# Patient Record
Sex: FEMALE | Race: BLACK OR AFRICAN AMERICAN | NOT HISPANIC OR LATINO | Employment: OTHER | URBAN - METROPOLITAN AREA
[De-identification: names, ages, dates, MRNs, and addresses within clinical notes are randomized per-mention and may not be internally consistent; named-entity substitution may affect disease eponyms.]

---

## 2020-10-12 ENCOUNTER — OFFICE VISIT (OUTPATIENT)
Dept: PRIMARY CARE CLINIC | Facility: CLINIC | Age: 75
End: 2020-10-12
Payer: MEDICARE

## 2020-10-12 VITALS
WEIGHT: 158.75 LBS | DIASTOLIC BLOOD PRESSURE: 68 MMHG | OXYGEN SATURATION: 99 % | HEART RATE: 72 BPM | TEMPERATURE: 98 F | SYSTOLIC BLOOD PRESSURE: 120 MMHG | BODY MASS INDEX: 29.21 KG/M2 | HEIGHT: 62 IN

## 2020-10-12 DIAGNOSIS — Z23 NEED FOR VACCINATION: ICD-10-CM

## 2020-10-12 DIAGNOSIS — Z79.899 OTHER LONG TERM (CURRENT) DRUG THERAPY: ICD-10-CM

## 2020-10-12 DIAGNOSIS — M48.062 LUMBAR STENOSIS WITH NEUROGENIC CLAUDICATION: Primary | ICD-10-CM

## 2020-10-12 DIAGNOSIS — Z23 ENCOUNTER FOR ADMINISTRATION OF VACCINE: ICD-10-CM

## 2020-10-12 DIAGNOSIS — Z13.6 ENCOUNTER FOR LIPID SCREENING FOR CARDIOVASCULAR DISEASE: ICD-10-CM

## 2020-10-12 DIAGNOSIS — R21 RASH AND NONSPECIFIC SKIN ERUPTION: ICD-10-CM

## 2020-10-12 DIAGNOSIS — Z13.220 ENCOUNTER FOR LIPID SCREENING FOR CARDIOVASCULAR DISEASE: ICD-10-CM

## 2020-10-12 DIAGNOSIS — Z13.1 SCREENING FOR DIABETES MELLITUS: ICD-10-CM

## 2020-10-12 DIAGNOSIS — H81.13 BENIGN PAROXYSMAL VERTIGO, BILATERAL: ICD-10-CM

## 2020-10-12 PROBLEM — L30.9 DERMATITIS: Status: ACTIVE | Noted: 2020-03-03

## 2020-10-12 PROBLEM — N95.1 HOT FLASH, MENOPAUSAL: Status: ACTIVE | Noted: 2019-08-01

## 2020-10-12 PROBLEM — H91.90 HEARING LOSS: Status: ACTIVE | Noted: 2020-10-12

## 2020-10-12 PROBLEM — E78.5 HYPERLIPIDEMIA: Status: ACTIVE | Noted: 2020-03-03

## 2020-10-12 PROBLEM — M54.30 SCIATICA: Status: ACTIVE | Noted: 2020-10-12

## 2020-10-12 PROBLEM — R73.01 IFG (IMPAIRED FASTING GLUCOSE): Status: ACTIVE | Noted: 2020-03-03

## 2020-10-12 PROBLEM — E78.00 PURE HYPERCHOLESTEROLEMIA, UNSPECIFIED: Status: ACTIVE | Noted: 2017-01-31

## 2020-10-12 PROBLEM — R20.2 RIGHT LEG PARESTHESIAS: Status: ACTIVE | Noted: 2018-09-23

## 2020-10-12 PROBLEM — M62.81 GENERALIZED MUSCLE WEAKNESS: Status: ACTIVE | Noted: 2020-10-12

## 2020-10-12 PROBLEM — F43.10 POSTTRAUMATIC STRESS DISORDER: Status: ACTIVE | Noted: 2020-10-12

## 2020-10-12 PROBLEM — M19.90 OSTEOARTHRITIS: Status: ACTIVE | Noted: 2020-03-03

## 2020-10-12 PROBLEM — M43.16 SPONDYLOLISTHESIS AT L4-L5 LEVEL: Status: ACTIVE | Noted: 2018-08-21

## 2020-10-12 LAB
CHOLEST SERPL-MCNC: 168 MG/DL (ref 120–199)
CHOLEST/HDLC SERPL: 4.4 {RATIO} (ref 2–5)
ESTIMATED AVG GLUCOSE: 120 MG/DL (ref 68–131)
HBA1C MFR BLD HPLC: 5.8 % (ref 4–5.6)
HDLC SERPL-MCNC: 38 MG/DL (ref 40–75)
HDLC SERPL: 22.6 % (ref 20–50)
LDLC SERPL CALC-MCNC: 102 MG/DL (ref 63–159)
NONHDLC SERPL-MCNC: 130 MG/DL
TRIGL SERPL-MCNC: 140 MG/DL (ref 30–150)

## 2020-10-12 PROCEDURE — 83036 HEMOGLOBIN GLYCOSYLATED A1C: CPT

## 2020-10-12 PROCEDURE — 90694 VACC AIIV4 NO PRSRV 0.5ML IM: CPT | Mod: PBBFAC,PN

## 2020-10-12 PROCEDURE — G0008 ADMIN INFLUENZA VIRUS VAC: HCPCS | Mod: PBBFAC,PN

## 2020-10-12 PROCEDURE — 99215 OFFICE O/P EST HI 40 MIN: CPT | Mod: PBBFAC,PN | Performed by: FAMILY MEDICINE

## 2020-10-12 PROCEDURE — 99204 OFFICE O/P NEW MOD 45 MIN: CPT | Mod: S$PBB,,, | Performed by: FAMILY MEDICINE

## 2020-10-12 PROCEDURE — 99999 PR PBB SHADOW E&M-EST. PATIENT-LVL V: ICD-10-PCS | Mod: PBBFAC,,, | Performed by: FAMILY MEDICINE

## 2020-10-12 PROCEDURE — 99204 PR OFFICE/OUTPT VISIT, NEW, LEVL IV, 45-59 MIN: ICD-10-PCS | Mod: S$PBB,,, | Performed by: FAMILY MEDICINE

## 2020-10-12 PROCEDURE — 99999 PR PBB SHADOW E&M-EST. PATIENT-LVL V: CPT | Mod: PBBFAC,,, | Performed by: FAMILY MEDICINE

## 2020-10-12 PROCEDURE — 80061 LIPID PANEL: CPT

## 2020-10-12 RX ORDER — TRIAMCINOLONE ACETONIDE 1 MG/G
OINTMENT TOPICAL 3 TIMES DAILY
Qty: 1 TUBE | Refills: 3 | Status: SHIPPED | OUTPATIENT
Start: 2020-10-12 | End: 2022-06-09 | Stop reason: SDUPTHER

## 2020-10-12 RX ORDER — TRIAMCINOLONE ACETONIDE 1 MG/G
OINTMENT TOPICAL
COMMUNITY
Start: 2020-02-07 | End: 2020-10-12 | Stop reason: SDUPTHER

## 2020-10-12 RX ORDER — MECLIZINE HYDROCHLORIDE 25 MG/1
25 TABLET ORAL 2 TIMES DAILY PRN
Qty: 60 TABLET | Refills: 0 | Status: SHIPPED | OUTPATIENT
Start: 2020-10-12 | End: 2021-03-12 | Stop reason: SDUPTHER

## 2020-10-12 RX ORDER — ZOLPIDEM TARTRATE 12.5 MG/1
12.5 TABLET, FILM COATED, EXTENDED RELEASE ORAL
COMMUNITY

## 2020-10-12 RX ORDER — MIRTAZAPINE 7.5 MG/1
7.5 TABLET, FILM COATED ORAL NIGHTLY PRN
COMMUNITY

## 2020-10-12 RX ORDER — ACETAMINOPHEN 500 MG
500 TABLET ORAL
COMMUNITY
End: 2020-10-16 | Stop reason: SDUPTHER

## 2020-10-12 RX ORDER — MECLIZINE HYDROCHLORIDE 25 MG/1
25 TABLET ORAL
COMMUNITY
Start: 2020-02-07 | End: 2020-10-12 | Stop reason: SDUPTHER

## 2020-10-12 RX ORDER — ESCITALOPRAM OXALATE 10 MG/1
TABLET ORAL
COMMUNITY
End: 2022-06-09

## 2020-10-12 RX ORDER — GABAPENTIN 300 MG/1
300 CAPSULE ORAL 3 TIMES DAILY
Qty: 90 CAPSULE | Refills: 3 | Status: SHIPPED | OUTPATIENT
Start: 2020-10-12 | End: 2020-10-16

## 2020-10-12 NOTE — PROGRESS NOTES
Verified pt ID using name and . Allergies verified. Administered Influenza (FLUAD) - Quadrivalent - Adjuvanted - PF *Preferred* (65+) 0.5 ML in right deltoid per physician order using aseptic technique. Aspirated and no blood return noted. Pt tolerated well with no adverse reactions noted.

## 2020-10-12 NOTE — PROGRESS NOTES
"Clinic Note  10/12/2020      Subjective:       Patient ID:  Brandi is a 75 y.o. female being seen for an new visit.      Chief Complaint: Establish Care, Flu Vaccine, Medication Refill, and Back Pain (low back)    Establish care - has been getting care from [facility] for the last 40 years.  Now, trying to get plugged into [facility] your system for 2nd opinion on her lower back surgeries.    Chronic low back pain-has history of 4 surgeries for this.  Had surgery in 2015 for herniated disc.  In 2018, had L4-L5 fusion then had complications.  Thirty days after, had "glue stuck on a nerve and that had to be removed.  Then had infection and required washout and 6 weeks of IV antibiotics.  In 2019, was seen by a surgeon at [facility].  Patient had MRI done and surgeon recommended spinal fusion of L3 through L5.  Patient was uncomfortable with going through such a major surgery, would like a 2nd opinion from another back surgeon.  Has appointment with orthopedic physician assistant on December 7, 2020.  Going to physical therapy again starting today.  Pain is worse in the morning when she feels like sleep medicine has worn off.  Does not like taking opioids or tramadol for pain, taking Tylenol as needed for severe pain.  Previously taking gabapentin, took 1 pill in the morning and 2 pills at night.  Gabapentin helps with sleep.    Left shin - getting more pain. Start happening couple of months ago.         History reviewed. No pertinent family history.  Social History     Socioeconomic History    Marital status:      Spouse name: Not on file    Number of children: Not on file    Years of education: Not on file    Highest education level: Not on file   Occupational History    Not on file   Social Needs    Financial resource strain: Not on file    Food insecurity     Worry: Not on file     Inability: Not on file    Transportation needs     Medical: Not on file     Non-medical: Not on file   Tobacco Use    Smoking " status: Never Smoker    Smokeless tobacco: Never Used   Substance and Sexual Activity    Alcohol use: No    Drug use: Not on file    Sexual activity: Not on file   Lifestyle    Physical activity     Days per week: Not on file     Minutes per session: Not on file    Stress: Not on file   Relationships    Social connections     Talks on phone: Not on file     Gets together: Not on file     Attends Mu-ism service: Not on file     Active member of club or organization: Not on file     Attends meetings of clubs or organizations: Not on file     Relationship status: Not on file   Other Topics Concern    Not on file   Social History Narrative    Not on file     Past Surgical History:   Procedure Laterality Date    HYSTERECTOMY      SHOULDER SURGERY      TONSILLECTOMY       Medication List with Changes/Refills   New Medications    GABAPENTIN (NEURONTIN) 300 MG CAPSULE    Take 1 capsule (300 mg total) by mouth 3 (three) times daily.   Current Medications    ACETAMINOPHEN (TYLENOL) 500 MG TABLET    Take 500 mg by mouth.    ESCITALOPRAM OXALATE (LEXAPRO) 10 MG TABLET    Take by mouth.    MIRTAZAPINE (REMERON) 7.5 MG TAB    Take 7.5 mg by mouth every evening.    NAPROXEN SODIUM (ANAPROX) 220 MG TABLET    Take 220 mg by mouth 2 (two) times daily with meals.    ZOLPIDEM (AMBIEN CR) 12.5 MG CR TABLET    Take 12.5 mg by mouth.   Changed and/or Refilled Medications    Modified Medication Previous Medication    MECLIZINE (ANTIVERT) 25 MG TABLET meclizine (ANTIVERT) 25 mg tablet       Take 1 tablet (25 mg total) by mouth 2 (two) times daily as needed.    Take 25 mg by mouth.    TRIAMCINOLONE ACETONIDE 0.1% (KENALOG) 0.1 % OINTMENT triamcinolone acetonide 0.1% (KENALOG) 0.1 % ointment       Apply topically 3 (three) times daily.    Apply topically.   Discontinued Medications    DIAZEPAM (VALIUM) 5 MG TABLET    Take 5 mg by mouth every 6 (six) hours as needed.    PROMETHAZINE (PHENERGAN) 25 MG TABLET    Take 1 tablet (25  "mg total) by mouth every 6 (six) hours as needed for Nausea.    TRAMADOL (ULTRAM) 50 MG TABLET    Take 50 mg by mouth every 6 (six) hours as needed.     Patient Active Problem List   Diagnosis    Benign paroxysmal vertigo, bilateral    Dermatitis    Generalized muscle weakness    Hearing loss    Hot flash, menopausal    Hyperlipidemia    IFG (impaired fasting glucose)    Insomnia    Low back pain    Lumbar stenosis with neurogenic claudication    Osteoarthritis    Other depressive disorder    Posttraumatic stress disorder    Pure hypercholesterolemia, unspecified    Right leg paresthesias    Sciatica    Spondylolisthesis at L4-L5 level     Review of Systems   Constitutional: Negative for chills, fever, malaise/fatigue and weight loss.   Respiratory: Negative for cough, shortness of breath and wheezing.    Cardiovascular: Negative for chest pain and palpitations.   Gastrointestinal: Negative for constipation, diarrhea, nausea and vomiting.   Genitourinary: Negative for dysuria.   Musculoskeletal: Positive for back pain. Negative for falls and myalgias.   Skin: Negative for rash.         Objective:      /68 (BP Location: Right arm, Patient Position: Sitting, BP Method: Medium (Manual))   Pulse 72   Temp 98 °F (36.7 °C) (Oral)   Ht 5' 2" (1.575 m)   Wt 72 kg (158 lb 11.7 oz)   SpO2 99%   BMI 29.03 kg/m²   Estimated body mass index is 29.03 kg/m² as calculated from the following:    Height as of this encounter: 5' 2" (1.575 m).    Weight as of this encounter: 72 kg (158 lb 11.7 oz).  Physical Exam   Constitutional: She is oriented to person, place, and time and well-developed, well-nourished, and in no distress. No distress.   HENT:   Head: Normocephalic and atraumatic.   Eyes: Conjunctivae and EOM are normal.   Cardiovascular: Normal rate, regular rhythm and normal heart sounds.   Pulmonary/Chest: Effort normal and breath sounds normal. No respiratory distress. She has no wheezes. "   Abdominal: Soft.   Musculoskeletal: Normal range of motion.   Neurological: She is alert and oriented to person, place, and time. GCS score is 15.   Skin: Skin is warm and dry. No rash noted. She is not diaphoretic. No erythema.   Psychiatric: Affect normal.         Assessment and Plan:     1. Lumbar stenosis with neurogenic claudication  - acetaminophen (TYLENOL) 500 MG tablet; Take 500 mg by mouth.  - gabapentin (NEURONTIN) 300 MG capsule; Take 1 capsule (300 mg total) by mouth 3 (three) times daily.  Dispense: 90 capsule; Refill: 3.  Instructed patient to start 1 pill at night, then increase to 2 pills at night, then can do 1 tablet in the morning and 2 tablets at night  - Ambulatory referral/consult to Neurosurgery; Future  - Ambulatory referral/consult to Pain Clinic; Future    2. Encounter for lipid screening for cardiovascular disease  - Lipid Panel; Future  - Lipid Panel    3. Screening for diabetes mellitus  - Hemoglobin A1C; Future  - Hemoglobin A1C    4. Other long term (current) drug therapy   - Hemoglobin A1C; Future  - Hemoglobin A1C    5. Rash and nonspecific skin eruption  - triamcinolone acetonide 0.1% (KENALOG) 0.1 % ointment; Apply topically 3 (three) times daily.  Dispense: 1 Tube; Refill: 3    6. Benign paroxysmal vertigo, bilateral  - meclizine (ANTIVERT) 25 mg tablet; Take 1 tablet (25 mg total) by mouth 2 (two) times daily as needed.  Dispense: 60 tablet; Refill: 0    7. Encounter for administration of vaccine  - Influenza - Quadrivalent (PF)        Follow up:   Follow up in about 3 months (around 1/12/2021) for lower back pain f/u.     Other Orders Placed This Visit:  Orders Placed This Encounter   Procedures    Influenza - Quadrivalent (PF)    Lipid Panel     Standing Status:   Future     Number of Occurrences:   1     Standing Expiration Date:   11/12/2020    Hemoglobin A1C     Standing Status:   Future     Number of Occurrences:   1     Standing Expiration Date:   11/12/2020     Ambulatory referral/consult to Neurosurgery     Standing Status:   Future     Standing Expiration Date:   11/12/2021     Referral Priority:   Routine     Referral Type:   Consultation     Referral Reason:   Specialty Services Required     Requested Specialty:   Neurosurgery     Number of Visits Requested:   1    Ambulatory referral/consult to Pain Clinic     Standing Status:   Future     Standing Expiration Date:   11/12/2021     Referral Priority:   Routine     Referral Type:   Consultation     Referral Reason:   Specialty Services Required     Requested Specialty:   Pain Medicine     Number of Visits Requested:   1           Hao Chau MD

## 2020-10-15 ENCOUNTER — TELEPHONE (OUTPATIENT)
Dept: NEUROSURGERY | Facility: CLINIC | Age: 75
End: 2020-10-15

## 2020-10-15 NOTE — TELEPHONE ENCOUNTER
"Patient has been called to schedule an appointment. Patient sates" she would like to see an Orthopedic surgeon. Message has been sent to Dr. Singletary staff.  "

## 2020-10-15 NOTE — PROGRESS NOTES
[facility] New Patient Evaluation    Referred by: Hao Chau MD   Reason for referral: M48.062 (ICD-10-CM) - Lumbar stenosis with neurogenic claudication       CC:   Chief Complaint   Patient presents with    Low-back Pain     Last 3 PDI Scores 10/16/2020   Pain Disability Index (PDI) 36       HPI:   Brandi Manuel is a 75 y.o. female who complains of chronic low back and leg pain related to degenerative spine disease.  Her history is significant for a severe disc herniation at L4-5 in 2015 for which she underwent L4-5 fusion in 2018.  The postoperative course of the fusion in 2018 was complicated by the need for a revision due to mouth placed hardware as well as hardware infection requiring washout and hardware replacement.  Since that time, she has experienced low back and leg pain.  She reports a strong aversion to the use of chronic opioid therapy and is seeking non opioid management strategies for her pain.  She was recently evaluated by a primary care provider in the [facility] system and is transitioning her care from the [facility] system.    Location: low back   Onset: Dec 2018  Current Pain Score: 6/10  Daily Pain of Range: 6-10/10  Quality: Aching and Tight  Radiation: bilateral hips and legs  Worsened by: nothing in particular  Improved by: heat and medications    Previous Therapies:  PT/OT: Yes  HEP: Yes, but limited by pain  Interventions: Yes, prior to surgery underwent multiple ESIs  Surgery: Yes, see HPI above  Medications:   - NSAIDS:   - MSK Relaxants:   - TCAs:   - SNRIs:   - Topicals:   - Anticonvulsants:  - Opioids:     Current Pain Medications:  1. Gabapentin 300mg TID - not yet started    Review of Systems:  Review of Systems   Constitutional: Negative for chills and fever.   HENT: Negative for nosebleeds.    Eyes: Negative for blurred vision and pain.   Respiratory: Negative for hemoptysis.    Cardiovascular: Negative for chest pain and palpitations.   Gastrointestinal: Negative for  heartburn, nausea and vomiting.   Genitourinary: Negative for dysuria and hematuria.   Musculoskeletal: Positive for back pain. Negative for myalgias.   Skin: Negative for rash.   Neurological: Positive for tingling. Negative for focal weakness, seizures and loss of consciousness.   Endo/Heme/Allergies: Does not bruise/bleed easily.   Psychiatric/Behavioral: Positive for depression. Negative for hallucinations. The patient is nervous/anxious and has insomnia.        History:    Current Outpatient Medications:     gabapentin (NEURONTIN) 300 MG capsule, Take 1 capsule (300 mg total) by mouth 3 (three) times daily., Disp: 90 capsule, Rfl: 3    meclizine (ANTIVERT) 25 mg tablet, Take 1 tablet (25 mg total) by mouth 2 (two) times daily as needed., Disp: 60 tablet, Rfl: 0    mirtazapine (REMERON) 7.5 MG Tab, Take 7.5 mg by mouth every evening., Disp: , Rfl:     triamcinolone acetonide 0.1% (KENALOG) 0.1 % ointment, Apply topically 3 (three) times daily., Disp: 1 Tube, Rfl: 3    zolpidem (AMBIEN CR) 12.5 MG CR tablet, Take 12.5 mg by mouth., Disp: , Rfl:     acetaminophen (TYLENOL) 500 MG tablet, Take 500 mg by mouth., Disp: , Rfl:     escitalopram oxalate (LEXAPRO) 10 MG tablet, Take by mouth., Disp: , Rfl:     naproxen sodium (ANAPROX) 220 MG tablet, Take 220 mg by mouth 2 (two) times daily with meals., Disp: , Rfl:     No past medical history on file.    Past Surgical History:   Procedure Laterality Date    HYSTERECTOMY      SHOULDER SURGERY      TONSILLECTOMY         No family history on file.    Social History     Socioeconomic History    Marital status:      Spouse name: Not on file    Number of children: Not on file    Years of education: Not on file    Highest education level: Not on file   Occupational History    Not on file   Social Needs    Financial resource strain: Not on file    Food insecurity     Worry: Not on file     Inability: Not on file    Transportation needs     Medical: Not  on file     Non-medical: Not on file   Tobacco Use    Smoking status: Never Smoker    Smokeless tobacco: Never Used   Substance and Sexual Activity    Alcohol use: No    Drug use: Not on file    Sexual activity: Not on file   Lifestyle    Physical activity     Days per week: Not on file     Minutes per session: Not on file    Stress: Not on file   Relationships    Social connections     Talks on phone: Not on file     Gets together: Not on file     Attends Sikh service: Not on file     Active member of club or organization: Not on file     Attends meetings of clubs or organizations: Not on file     Relationship status: Not on file   Other Topics Concern    Not on file   Social History Narrative    Not on file       Review of patient's allergies indicates:   Allergen Reactions    Codeine     Hydrocodone-acetaminophen Nausea Only       Physical Exam:  Vitals:    10/16/20 1338 10/16/20 1339   BP:  130/64   Weight: 72 kg (158 lb 11.2 oz) 72 kg (158 lb 11.2 oz)   PainSc:   6   6     General    Nursing note and vitals reviewed.  Constitutional: She is oriented to person, place, and time. She appears well-developed and well-nourished. No distress.   HENT:   Head: Normocephalic and atraumatic.   Nose: Nose normal.   Eyes: Conjunctivae and EOM are normal. Pupils are equal, round, and reactive to light. Right eye exhibits no discharge. Left eye exhibits no discharge. No scleral icterus.   Neck: No JVD present.   Cardiovascular: Intact distal pulses.    Pulmonary/Chest: Effort normal. No respiratory distress.   Abdominal: She exhibits no distension.   Neurological: She is alert and oriented to person, place, and time. Coordination normal.   Psychiatric: She has a normal mood and affect. Her behavior is normal. Judgment and thought content normal.     General Musculoskeletal Exam   Gait: normal     Back (L-Spine & T-Spine) / Neck (C-Spine) Exam     Tenderness Right paramedian tenderness of the Lower L-Spine.  Left paramedian tenderness of the Lower L-Spine.     Back (L-Spine & T-Spine) Range of Motion   Back extension: facet loading is positive and exacerabtes/reproduces the patient's typical low back pain    Back flexion: limited ROM but partial relief of low back pain noted.     Spinal Sensation   Right Side Sensation  L-Spine Level: normal  Left Side Sensation  L-Spine Level: normal    Other She has no scoliosis .      Muscle Strength   Right Lower Extremity   Hip Flexion: 5/5   Hip Extensors: 5/5  Quadriceps:  5/5   Hamstrin/5   Gastrocsoleus:  5/5   Left Lower Extremity   Hip Flexion: 5/5   Hip Extensors: 5/5  Quadriceps:  5/5   Hamstrin/5   Gastrocsoleus:  5/5     Reflexes     Left Side  Achilles:  2+  Quadriceps:  2+    Right Side   Achilles:  2+  Quadriceps:  2+      Imaging:  Patient presents external MRI from 2019 which shows L4-5 fusion with hardware and pedicle screws extending into the vertebra.  There is anterolisthesis of L4 on L5 with partial unroofing of the disc at that level.  There is significant degenerative disc disease at L3-4 resulting in central canal stenosis.  There is facet hypertrophy both above and below the fusion.    Labs:  BMP  Lab Results   Component Value Date     2014    K 3.6 2014     2014    CO2 26 2014    BUN 11 2014    CREATININE 1.0 2014    CALCIUM 10.5 2014    ANIONGAP 12 2014    ESTGFRAFRICA >60 2014    EGFRNONAA 58 (A) 2014     Lab Results   Component Value Date    ALT 16 2014    AST 24 2014    ALKPHOS 149 (H) 2014    BILITOT 0.5 2014       Assessment:  Problem List Items Addressed This Visit     Lumbar stenosis with neurogenic claudication - Primary    Relevant Medications    pregabalin (LYRICA) 25 MG capsule    pregabalin (LYRICA) 50 MG capsule (Start on 10/31/2020)    acetaminophen (TYLENOL) 500 MG tablet    DDD (degenerative disc disease), lumbar    Relevant  Medications    pregabalin (LYRICA) 25 MG capsule    pregabalin (LYRICA) 50 MG capsule (Start on 10/31/2020)    acetaminophen (TYLENOL) 500 MG tablet    Lumbar radiculopathy    Relevant Medications    pregabalin (LYRICA) 25 MG capsule    pregabalin (LYRICA) 50 MG capsule (Start on 10/31/2020)    acetaminophen (TYLENOL) 500 MG tablet          10/16/20 - Brandi Manuel is a 75 y.o. 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  She denies a trial of Lyrica, duloxetine, or spinal cord stimulation.  All 3 of these are excellent options for her and I have recommended starting with a more conservative of the three- Lyrica and duloxetine.  Given her history of anxiety and depression we will hold off on starting duloxetine for now.  I will start her on Lyrica 25 mg titration toward a goal of 25 mg t.i.d..  Once she reaches 25 mg t.i.d., I will transition her to 50 mg b.i.d. at which time she will follow up for assessment.  She is a candidate for spinal cord stimulation due to failed back surgical syndrome; however, the patient was advised that her chances of a highly successful trial and implantation are limited by central canal stenosis at L3-4.  It would certainly be appropriate for her to be evaluated by our surgical team here at [facility], and she has a pending evaluation with Dr. Singletary in December 2020.    : Reviewed and consistent with medication use as prescribed.    Treatment Plan:   Procedures: None at this time per patient preference.  Patient avoiding ESIs due to preference.  She is a candidate for SCS trial.  PT/OT/HEP:    - I encouraged the patient to start a home exercise regimen that includes daily, moderate cardiovascular exercise lasting at least 30 minutes.  This may include yoga, imani chi, walking, swimming, aqua aerobics, or  other exercises that maintain a heart rate of 50-70% of the calculated maximum heart rate.  I also encouraged light, daily stretching focused on the target area.   - Patient restarted PT on her own at [facility], which I have encouraged her to continue  Medications:    - Lyrica started at 25 mg QHS to be titrated toward 50 mg BID  Labs: reviewed and medications are appropriately dosed for current hepatorenal function.  Imaging: No additional recommended at this time.    Follow Up: RTC 6 weeks    Nagi Augustine Jr, MD  Interventional Pain Medicine / Anesthesiology    Disclaimer: This note was partly generated using dictation software which may occasionally result in transcription errors.

## 2020-10-15 NOTE — TELEPHONE ENCOUNTER
----- Message from Jazmin Nuñez MA sent at 10/14/2020  6:09 PM CDT -----    ----- Message -----  From: Erma Garcia  Sent: 10/14/2020  12:42 PM CDT  To: [facility] Neurosurgery Clinical Support    Dr.Van Chau  has placed a referral for the patient to be seen with Neurosurgery. Please assist with scheduling.     Lumbar stenosis with neurogenic claudication [M48.062]

## 2020-10-16 ENCOUNTER — OFFICE VISIT (OUTPATIENT)
Dept: PAIN MEDICINE | Facility: CLINIC | Age: 75
End: 2020-10-16
Payer: MEDICARE

## 2020-10-16 VITALS
WEIGHT: 158.69 LBS | SYSTOLIC BLOOD PRESSURE: 130 MMHG | BODY MASS INDEX: 29.03 KG/M2 | DIASTOLIC BLOOD PRESSURE: 64 MMHG

## 2020-10-16 DIAGNOSIS — M54.16 LUMBAR RADICULOPATHY: ICD-10-CM

## 2020-10-16 DIAGNOSIS — M51.36 DDD (DEGENERATIVE DISC DISEASE), LUMBAR: ICD-10-CM

## 2020-10-16 DIAGNOSIS — M48.062 LUMBAR STENOSIS WITH NEUROGENIC CLAUDICATION: Primary | ICD-10-CM

## 2020-10-16 PROBLEM — M51.369 DDD (DEGENERATIVE DISC DISEASE), LUMBAR: Status: ACTIVE | Noted: 2020-10-16

## 2020-10-16 PROCEDURE — 99204 OFFICE O/P NEW MOD 45 MIN: CPT | Mod: S$PBB,,, | Performed by: PAIN MEDICINE

## 2020-10-16 PROCEDURE — 99999 PR PBB SHADOW E&M-EST. PATIENT-LVL III: CPT | Mod: PBBFAC,,, | Performed by: PAIN MEDICINE

## 2020-10-16 PROCEDURE — 99999 PR PBB SHADOW E&M-EST. PATIENT-LVL III: ICD-10-PCS | Mod: PBBFAC,,, | Performed by: PAIN MEDICINE

## 2020-10-16 PROCEDURE — 99213 OFFICE O/P EST LOW 20 MIN: CPT | Mod: PBBFAC,PN | Performed by: PAIN MEDICINE

## 2020-10-16 PROCEDURE — 99204 PR OFFICE/OUTPT VISIT, NEW, LEVL IV, 45-59 MIN: ICD-10-PCS | Mod: S$PBB,,, | Performed by: PAIN MEDICINE

## 2020-10-16 RX ORDER — ACETAMINOPHEN 500 MG
1000 TABLET ORAL EVERY 8 HOURS PRN
Start: 2020-10-16 | End: 2022-10-19 | Stop reason: DRUGHIGH

## 2020-10-16 RX ORDER — PREGABALIN 25 MG/1
CAPSULE ORAL
Qty: 30 CAPSULE | Refills: 0 | Status: SHIPPED | OUTPATIENT
Start: 2020-10-16 | End: 2020-10-26 | Stop reason: SDUPTHER

## 2020-10-16 RX ORDER — PREGABALIN 50 MG/1
50 CAPSULE ORAL 2 TIMES DAILY
Qty: 60 CAPSULE | Refills: 0 | Status: SHIPPED | OUTPATIENT
Start: 2020-10-31 | End: 2020-10-26 | Stop reason: SDUPTHER

## 2020-10-16 NOTE — LETTER
October 16, 2020      Hao Chau MD  [address]  [address]  [facility] [address]           [facility] - Pain Management  [address] [facility] [address]  Phone: [phone]  Fax: [phone]          Patient: Brandi Manuel   MR Number: [id number]   YOB: 1945   Date of Visit: 10/16/2020       Dear Dr. Barkley:    Thank you for referring Brandi Manuel to me for evaluation. Attached you will find relevant portions of my assessment and plan of care.    If you have questions, please do not hesitate to call me. I look forward to following Brandi Manuel along with you.    Sincerely,    Nagi Augustine Jr., MD    Enclosure  CC:  No Recipients    If you would like to receive this communication electronically, please contact [email or web address] or [phone] to request more information on [facility] Link access.    For providers and/or their staff who would like to refer a patient to [facility], please contact us through our one-stop-shop provider referral line, [facility], at [phone].    If you feel you have received this communication in error or would no longer like to receive these types of communications, please e-mail [email or web address]

## 2020-10-22 ENCOUNTER — TELEPHONE (OUTPATIENT)
Dept: PAIN MEDICINE | Facility: CLINIC | Age: 75
End: 2020-10-22

## 2020-10-22 DIAGNOSIS — M51.36 DDD (DEGENERATIVE DISC DISEASE), LUMBAR: ICD-10-CM

## 2020-10-22 DIAGNOSIS — M48.062 LUMBAR STENOSIS WITH NEUROGENIC CLAUDICATION: ICD-10-CM

## 2020-10-22 DIAGNOSIS — M54.16 LUMBAR RADICULOPATHY: ICD-10-CM

## 2020-10-22 RX ORDER — PREGABALIN 25 MG/1
CAPSULE ORAL
Qty: 75 CAPSULE | Refills: 0 | Status: CANCELLED | OUTPATIENT
Start: 2020-10-22 | End: 2020-11-21

## 2020-10-22 NOTE — TELEPHONE ENCOUNTER
Called patient back and explained the issue. Mail order not accepting controlled substance Rx per Dr Augustine.   She stated that she will contact [facility] tomorrow and inquire about this. If the issue remains, she will  Rx at [facility] when she gets her check. Patient will call back tomorrow.   FYI  ER          Patient uses mail order; not [facility].   Rx at [facility] cancelled and per patient, removed as favorite.     Thanks,   ER

## 2020-10-22 NOTE — TELEPHONE ENCOUNTER
----- Message from Bryon Jones sent at 10/22/2020  9:22 AM CDT -----  Regarding: Medication  Type:  Needs Medical Advice    Who Called: patient  Would the patient rather a call back or a response via [facility]?  Call back  Best Call Back Number:  [phone] or [phone]  Additional Information:  please call in regard to her medication that needs to be sent to her mail order pharmacy; says she needs call back immediately; call on both numbers to try and reach her

## 2020-10-22 NOTE — TELEPHONE ENCOUNTER
Patient called back stated that the Rx for Lyrica can be faxed to [facility].   FX# [phone]  PH# [phone]   I called them to verify this information; and in fact Rx does have to be faxed. No need for any additional forms to be completed. Will await tomorrow to have MD print Rx and fax over.   Spoke with patient who voiced understanding.         ----- Message from Meaghan Warren sent at 10/22/2020  4:41 PM CDT -----  Contact: [phone] or [phone]/Self  Type: Requesting to speak with nurse    Who Called: Pt  Regarding: speak with Ameena , personal matter    Would the patient rather a call back or a response via [facility]? Call back  Best Call Back Number: [phone] or [phone]  Additional Information:

## 2020-10-26 DIAGNOSIS — M51.36 DDD (DEGENERATIVE DISC DISEASE), LUMBAR: ICD-10-CM

## 2020-10-26 DIAGNOSIS — M54.16 LUMBAR RADICULOPATHY: ICD-10-CM

## 2020-10-26 DIAGNOSIS — M48.062 LUMBAR STENOSIS WITH NEUROGENIC CLAUDICATION: ICD-10-CM

## 2020-10-26 RX ORDER — PREGABALIN 25 MG/1
CAPSULE ORAL
Qty: 30 CAPSULE | Refills: 0 | Status: SHIPPED | OUTPATIENT
Start: 2020-10-26 | End: 2020-11-10

## 2020-10-26 RX ORDER — PREGABALIN 50 MG/1
50 CAPSULE ORAL 2 TIMES DAILY
Qty: 60 CAPSULE | Refills: 0 | Status: SHIPPED | OUTPATIENT
Start: 2020-11-10 | End: 2020-12-18 | Stop reason: SDUPTHER

## 2020-12-09 ENCOUNTER — HOSPITAL ENCOUNTER (OUTPATIENT)
Dept: RADIOLOGY | Facility: HOSPITAL | Age: 75
Discharge: HOME | End: 2020-12-09
Attending: ORTHOPAEDIC SURGERY
Payer: MEDICARE

## 2020-12-09 ENCOUNTER — OFFICE VISIT (OUTPATIENT)
Dept: ORTHOPEDICS | Facility: CLINIC | Age: 75
End: 2020-12-09
Payer: MEDICARE

## 2020-12-09 VITALS — HEIGHT: 62 IN | WEIGHT: 164.69 LBS | BODY MASS INDEX: 30.31 KG/M2

## 2020-12-09 DIAGNOSIS — M51.36 DDD (DEGENERATIVE DISC DISEASE), LUMBAR: ICD-10-CM

## 2020-12-09 DIAGNOSIS — M43.27 FUSION OF SPINE, LUMBOSACRAL REGION: ICD-10-CM

## 2020-12-09 DIAGNOSIS — M48.062 LUMBAR STENOSIS WITH NEUROGENIC CLAUDICATION: ICD-10-CM

## 2020-12-09 PROCEDURE — 72120 XR LUMBAR SPINE AP AND LAT WITH FLEX/EXT: ICD-10-PCS | Mod: 26,,, | Performed by: RADIOLOGY

## 2020-12-09 PROCEDURE — 99999 PR PBB SHADOW E&M-EST. PATIENT-LVL III: CPT | Mod: PBBFAC,,, | Performed by: ORTHOPAEDIC SURGERY

## 2020-12-09 PROCEDURE — 99999 PR PBB SHADOW E&M-EST. PATIENT-LVL III: ICD-10-PCS | Mod: PBBFAC,,, | Performed by: ORTHOPAEDIC SURGERY

## 2020-12-09 PROCEDURE — 99204 OFFICE O/P NEW MOD 45 MIN: CPT | Mod: S$PBB,,, | Performed by: ORTHOPAEDIC SURGERY

## 2020-12-09 PROCEDURE — 99213 OFFICE O/P EST LOW 20 MIN: CPT | Mod: PBBFAC,25 | Performed by: ORTHOPAEDIC SURGERY

## 2020-12-09 PROCEDURE — 72120 X-RAY BEND ONLY L-S SPINE: CPT | Mod: TC

## 2020-12-09 PROCEDURE — 72120 X-RAY BEND ONLY L-S SPINE: CPT | Mod: 26,,, | Performed by: RADIOLOGY

## 2020-12-09 PROCEDURE — 99204 PR OFFICE/OUTPT VISIT, NEW, LEVL IV, 45-59 MIN: ICD-10-PCS | Mod: S$PBB,,, | Performed by: ORTHOPAEDIC SURGERY

## 2020-12-09 PROCEDURE — 72100 XR LUMBAR SPINE AP AND LAT WITH FLEX/EXT: ICD-10-PCS | Mod: 26,,, | Performed by: RADIOLOGY

## 2020-12-09 PROCEDURE — 72100 X-RAY EXAM L-S SPINE 2/3 VWS: CPT | Mod: 26,,, | Performed by: RADIOLOGY

## 2020-12-09 NOTE — PROGRESS NOTES
DATE: 12/9/2020  PATIENT: Brandi Manuel    Attending Physician: Levar Singletary M.D.    CHIEF COMPLAINT: low back pain    HISTORY:  Brandi Manuel is a 75 y.o. female who is here for initial evaluation of low back and bilateral leg pain (Back - 5, Leg - 6). The pain has been present for many years. In 2015, she had a discectomy by Dr Kathleen. In 2018, she had TLIF L4-5 with Dr murphy and then 2 months later a revision for bone graft compression the nerve was completed. Following that surgery, she suffered from an infection. (gram negative bacilli per notes). She was treated with IV abx for 6 weeks total.  She saw Dr Manning at [facility] who was recommending additional surgery for L3-4 spondylolisthesis. She is establishing care here after being followed at [facility]. The patient describes the pain as severe in the lower back radiating to her hips and knees and down the anterior shin.  The pain is worse with activity and improved by rest/heating pads/lyrica. There is no associated numbness and tingling. There is no subjective weakness. Prior treatments have included Surgery, Muscle relaxants, heating pads, PT, but no recent injections. Last injection was before original surgery.     The Patient denies myelopathic symptoms such as handwriting changes or difficulty with buttons/coins/keys. Denies perineal paresthesias, bowel/bladder dysfunction.    PAST MEDICAL/SURGICAL HISTORY:  History reviewed. No pertinent past medical history.  Past Surgical History:   Procedure Laterality Date    HYSTERECTOMY      SHOULDER SURGERY      TONSILLECTOMY         Current Medications:   Current Outpatient Medications:     acetaminophen (TYLENOL) 500 MG tablet, Take 2 tablets (1,000 mg total) by mouth every 8 (eight) hours as needed for Pain., Disp: , Rfl:     escitalopram oxalate (LEXAPRO) 10 MG tablet, Take by mouth., Disp: , Rfl:     meclizine (ANTIVERT) 25 mg tablet, Take 1 tablet (25 mg total) by mouth 2 (two) times daily as needed.,  Disp: 60 tablet, Rfl: 0    mirtazapine (REMERON) 7.5 MG Tab, Take 7.5 mg by mouth every evening., Disp: , Rfl:     naproxen sodium (ANAPROX) 220 MG tablet, Take 220 mg by mouth 2 (two) times daily with meals., Disp: , Rfl:     pregabalin (LYRICA) 50 MG capsule, Take 1 capsule (50 mg total) by mouth 2 (two) times daily., Disp: 60 capsule, Rfl: 0    triamcinolone acetonide 0.1% (KENALOG) 0.1 % ointment, Apply topically 3 (three) times daily., Disp: 1 Tube, Rfl: 3    zolpidem (AMBIEN CR) 12.5 MG CR tablet, Take 12.5 mg by mouth., Disp: , Rfl:     Social History:   Social History     Socioeconomic History    Marital status:      Spouse name: Not on file    Number of children: Not on file    Years of education: Not on file    Highest education level: Not on file   Occupational History    Not on file   Social Needs    Financial resource strain: Not on file    Food insecurity     Worry: Not on file     Inability: Not on file    Transportation needs     Medical: Not on file     Non-medical: Not on file   Tobacco Use    Smoking status: Never Smoker    Smokeless tobacco: Never Used   Substance and Sexual Activity    Alcohol use: No    Drug use: Not on file    Sexual activity: Not on file   Lifestyle    Physical activity     Days per week: Not on file     Minutes per session: Not on file    Stress: Not on file   Relationships    Social connections     Talks on phone: Not on file     Gets together: Not on file     Attends Muslim service: Not on file     Active member of club or organization: Not on file     Attends meetings of clubs or organizations: Not on file     Relationship status: Not on file   Other Topics Concern    Not on file   Social History Narrative    Not on file       REVIEW OF SYSTEMS:  Constitution: Negative. Negative for chills, fever and night sweats.   Cardiovascular: Negative for chest pain and syncope.   Respiratory: Negative for cough and shortness of breath.  "  Gastrointestinal: See HPI. Negative for nausea/vomiting. Negative for abdominal pain.  Genitourinary: See HPI. Negative for discoloration or dysuria.  Hematologic/Lymphatic: neg for bleeding/clotting disorders.   Musculoskeletal: Negative for falls and muscle weakness.   Neurological: See HPI. neg history of seizures. neg history of cranial surgery or shunts.  Neurological: See HPI. No seizures.   Endocrine: Negative for polydipsia, polyphagia and polyuria.   Allergic/Immunologic: Negative for hives and persistent infections.     EXAM:  Ht 5' 2" (1.575 m)   Wt 74.7 kg (164 lb 10.9 oz)   BMI 30.12 kg/m²     PHYSICAL EXAMINATION:    General: The patient is a WNWD 75 y.o. female in no apparent distress, the patient is orientatied to person, place and time.  Psych: Normal mood and affect  HEENT: Vision grossly intact, hearing intact to the spoken word.  Lungs: Respirations unlabored.  Gait: Normal station and gait, no difficulty with toe or heel walk.   Skin: Dorsal lumbar skin negative for rashes, lesions, hairy patches. Midline surgical scar noted There is no lumbar tenderness to palpation.  Range of motion: Lumbar range of motion is acceptable.  Spinal Balance: Global saggital and coronal spinal balance acceptable, no significant for scoliosis and kyphosis.  Musculoskeletal: No pain with the range of motion of the bilateral hips. No trochanteric tenderness to palpation.  Vascular: Bilateral lower extremities warm and well perfused, Dorsalis pedis pulses 2+ bilaterally.  Neurological: Normal strength and tone in all major motor groups in the bilateral lower extremities. Normal sensation to light touch in the L2-S1 dermatomes bilaterally.  Deep tendon reflexes symmetric  in the bilateral lower extremities.  Negative Babinski bilaterally. Straight leg raise negative bilaterally.    IMAGING:      Today I personally reviewed AP, Lat and Flex/Ex  upright L-spine that demonstrate Retained hardware L4-5  L3-4 " spondylolisthesis      Body mass index is 30.12 kg/m².  Hemoglobin A1C   Date Value Ref Range Status   10/12/2020 5.8 (H) 4.0 - 5.6 % Final     Comment:     ADA Screening Guidelines:  5.7-6.4%  Consistent with prediabetes  >or=6.5%  Consistent with diabetes  High levels of fetal hemoglobin interfere with the HbA1C  assay. Heterozygous hemoglobin variants (HbS, HgC, etc)do  not significantly interfere with this assay.   However, presence of multiple variants may affect accuracy.       Hemoglobin A1c   Date Value Ref Range Status   02/14/2020 6.0 (H) <5.7 % of total Hgb Final     Comment:     For someone without known diabetes, a hemoglobin   A1c value between 5.7% and 6.4% is consistent with  prediabetes and should be confirmed with a   follow-up test.    For someone with known diabetes, a value <7%  indicates that their diabetes is well controlled. A1c  targets should be individualized based on duration of  diabetes, age, comorbid conditions, and other  considerations.    This assay result is consistent with an increased risk  of diabetes.    Currently, no consensus exists regarding use of  hemoglobin A1c for diagnosis of diabetes for children.       ASSESSMENT/PLAN:    Brandi was seen today for pain.    Diagnoses and all orders for this visit:    Lumbar stenosis with neurogenic claudication  -     Ambulatory referral/consult to Neurosurgery      No follow-ups on file.    Brandi Manuel is a 75 y.o. female with L3-4 spondylolisthesis  Plan for MRI L spine and possible XIANG

## 2020-12-09 NOTE — LETTER
December 18, 2020      Hao Chau MD  [address]  [facility] [address]           [facility]  [address]  [facility] [address]  Phone: [phone]          Patient: Brandi Manuel   MR Number: [id number]   YOB: 1945   Date of Visit: 12/9/2020       Dear Dr. Barkley:    Thank you for referring Brandi Manuel to me for evaluation. Attached you will find relevant portions of my assessment and plan of care.    If you have questions, please do not hesitate to call me. I look forward to following Brandi Manuel along with you.    Sincerely,    Levar Singletary MD    Enclosure  CC:  No Recipients    If you would like to receive this communication electronically, please contact [email or web address] or [phone] to request more information on [employer] access.    For providers and/or their staff who would like to refer a patient to [facility], please contact us through our one-stop-shop provider referral line, [facility], at [phone].    If you feel you have received this communication in error or would no longer like to receive these types of communications, please e-mail [email or web address]

## 2020-12-18 ENCOUNTER — OFFICE VISIT (OUTPATIENT)
Dept: PAIN MEDICINE | Facility: CLINIC | Age: 75
End: 2020-12-18
Payer: MEDICARE

## 2020-12-18 VITALS — BODY MASS INDEX: 29.72 KG/M2 | DIASTOLIC BLOOD PRESSURE: 60 MMHG | WEIGHT: 162.5 LBS | SYSTOLIC BLOOD PRESSURE: 112 MMHG

## 2020-12-18 DIAGNOSIS — M51.36 DDD (DEGENERATIVE DISC DISEASE), LUMBAR: ICD-10-CM

## 2020-12-18 DIAGNOSIS — Z98.1 S/P LUMBAR FUSION: Primary | ICD-10-CM

## 2020-12-18 DIAGNOSIS — M54.16 LUMBAR RADICULOPATHY: ICD-10-CM

## 2020-12-18 DIAGNOSIS — M48.062 LUMBAR STENOSIS WITH NEUROGENIC CLAUDICATION: ICD-10-CM

## 2020-12-18 PROCEDURE — 99214 PR OFFICE/OUTPT VISIT, EST, LEVL IV, 30-39 MIN: ICD-10-PCS | Mod: S$PBB,,, | Performed by: PAIN MEDICINE

## 2020-12-18 PROCEDURE — 99999 PR PBB SHADOW E&M-EST. PATIENT-LVL III: ICD-10-PCS | Mod: PBBFAC,,, | Performed by: PAIN MEDICINE

## 2020-12-18 PROCEDURE — 99213 OFFICE O/P EST LOW 20 MIN: CPT | Mod: PBBFAC,PN | Performed by: PAIN MEDICINE

## 2020-12-18 PROCEDURE — 99999 PR PBB SHADOW E&M-EST. PATIENT-LVL III: CPT | Mod: PBBFAC,,, | Performed by: PAIN MEDICINE

## 2020-12-18 PROCEDURE — 99214 OFFICE O/P EST MOD 30 MIN: CPT | Mod: S$PBB,,, | Performed by: PAIN MEDICINE

## 2020-12-18 RX ORDER — PREGABALIN 75 MG/1
75 CAPSULE ORAL 2 TIMES DAILY
Qty: 60 CAPSULE | Refills: 1 | Status: SHIPPED | OUTPATIENT
Start: 2020-12-18 | End: 2021-02-12 | Stop reason: SDUPTHER

## 2020-12-18 NOTE — PROGRESS NOTES
----- Message from Shruthi Jalloh sent at 2/4/2020 12:22 PM CST -----  Contact: Pt Nell [phone]  Type:  Needs Medical Advice    Who Called: Nell    Would the patient rather a call back or a response via [facility]? Callback    Best Call Back Number: [phone]    Additional Information:     The pt is needing to spk with the nurse regarding her pressure meds and a follow-up appt if need be. The pt is requesting a call back   [facility] Established Patient Evaluation    Referred by: Hao Chau MD   Reason for referral: M48.062 (ICD-10-CM) - Lumbar stenosis with neurogenic claudication       CC:   Chief Complaint   Patient presents with    Low-back Pain     Last 3 PDI Scores 10/16/2020   Pain Disability Index (PDI) 36       Interval Update:   12/18/2020 - Ms. Manuel returns to clinic for follow up visit reporting slightly improved low back and leg pain after starting Lyrica.  She is currently taking Lyrica 50 mg b.i.d..  Since our last visit, she was also evaluated [facility] with recommendation for repeat MRI lumbar spine, though now with and without contrast.  In response to my offer of an epidural steroid injection, patient states a history of symptoms consistent with vasovagal response to pain during a transforaminal epidural steroid injection provided by interventional radiology at an outside facility.    HPI:   Brandi Manuel is a 75 y.o. female who complains of chronic low back and leg pain related to degenerative spine disease.  Her history is significant for a severe disc herniation at L4-5 in 2015 for which she underwent L4-5 fusion in 2018.  The postoperative course of the fusion in 2018 was complicated by the need for a revision due to mouth placed hardware as well as hardware infection requiring washout and hardware replacement.  Since that time, she has experienced low back and leg pain.  She reports a strong aversion to the use of chronic opioid therapy and is seeking non opioid management strategies for her pain.  She was recently evaluated by a primary care provider in the [facility] system and is transitioning her care from the [facility] system.    Location: low back   Onset: Dec 2018  Current Pain Score: 6/10  Daily Pain of Range: 6-10/10  Quality: Aching and Tight  Radiation: bilateral hips and legs  Worsened by: nothing in particular   Improved by: heat and  medications    Previous Therapies:  PT/OT: Yes  HEP: Yes, but limited by pain  Interventions: Yes, prior to surgery underwent multiple ESIs  Surgery: Yes, see HPI above  Medications:   - NSAIDS:   - MSK Relaxants:   - TCAs:   - SNRIs:   - Topicals:   - Anticonvulsants:  - Opioids:     Current Pain Medications:  1. Pregabalin 50 mg b.i.d.  2. Tylenol 500 2-3 times per day  3. Anaprox    Review of Systems:  Review of Systems   Constitutional: Negative for chills and fever.   HENT: Negative for nosebleeds.    Eyes: Negative for blurred vision and pain.   Respiratory: Negative for hemoptysis.    Cardiovascular: Negative for chest pain and palpitations.   Gastrointestinal: Negative for heartburn, nausea and vomiting.   Genitourinary: Negative for dysuria and hematuria.   Musculoskeletal: Positive for back pain. Negative for myalgias.   Skin: Negative for rash.   Neurological: Positive for tingling. Negative for focal weakness, seizures and loss of consciousness.   Endo/Heme/Allergies: Does not bruise/bleed easily.   Psychiatric/Behavioral: Positive for depression. Negative for hallucinations. The patient is nervous/anxious and has insomnia.        History:    Current Outpatient Medications:     acetaminophen (TYLENOL) 500 MG tablet, Take 2 tablets (1,000 mg total) by mouth every 8 (eight) hours as needed for Pain., Disp: , Rfl:     escitalopram oxalate (LEXAPRO) 10 MG tablet, Take by mouth., Disp: , Rfl:     meclizine (ANTIVERT) 25 mg tablet, Take 1 tablet (25 mg total) by mouth 2 (two) times daily as needed., Disp: 60 tablet, Rfl: 0    mirtazapine (REMERON) 7.5 MG Tab, Take 7.5 mg by mouth every evening., Disp: , Rfl:     naproxen sodium (ANAPROX) 220 MG tablet, Take 220 mg by mouth 2 (two) times daily with meals., Disp: , Rfl:     pregabalin (LYRICA) 50 MG capsule, Take 1 capsule (50 mg total) by mouth 2 (two) times daily., Disp: 60 capsule, Rfl: 0    triamcinolone acetonide 0.1% (KENALOG) 0.1 % ointment, Apply  topically 3 (three) times daily., Disp: 1 Tube, Rfl: 3    zolpidem (AMBIEN CR) 12.5 MG CR tablet, Take 12.5 mg by mouth., Disp: , Rfl:     No past medical history on file.    Past Surgical History:   Procedure Laterality Date    HYSTERECTOMY      SHOULDER SURGERY      TONSILLECTOMY         No family history on file.    Social History     Socioeconomic History    Marital status:      Spouse name: Not on file    Number of children: Not on file    Years of education: Not on file    Highest education level: Not on file   Occupational History    Not on file   Social Needs    Financial resource strain: Not on file    Food insecurity     Worry: Not on file     Inability: Not on file    Transportation needs     Medical: Not on file     Non-medical: Not on file   Tobacco Use    Smoking status: Never Smoker    Smokeless tobacco: Never Used   Substance and Sexual Activity    Alcohol use: No    Drug use: Not on file    Sexual activity: Not on file   Lifestyle    Physical activity     Days per week: Not on file     Minutes per session: Not on file    Stress: Not on file   Relationships    Social connections     Talks on phone: Not on file     Gets together: Not on file     Attends Rastafarian service: Not on file     Active member of club or organization: Not on file     Attends meetings of clubs or organizations: Not on file     Relationship status: Not on file   Other Topics Concern    Not on file   Social History Narrative    Not on file       Review of patient's allergies indicates:   Allergen Reactions    Codeine     Hydrocodone-acetaminophen Nausea Only       Physical Exam:  There were no vitals filed for this visit.  General    Nursing note and vitals reviewed.  Constitutional: She is oriented to person, place, and time. She appears well-developed and well-nourished. No distress.   HENT:   Head: Normocephalic and atraumatic.   Nose: Nose normal.   Eyes: Conjunctivae and EOM are normal. Pupils  are equal, round, and reactive to light. Right eye exhibits no discharge. Left eye exhibits no discharge. No scleral icterus.   Neck: No JVD present.   Cardiovascular: Intact distal pulses.    Pulmonary/Chest: Effort normal. No respiratory distress.   Abdominal: She exhibits no distension.   Neurological: She is alert and oriented to person, place, and time. Coordination normal.   Psychiatric: She has a normal mood and affect. Her behavior is normal. Judgment and thought content normal.     General Musculoskeletal Exam   Gait: normal     Back (L-Spine & T-Spine) / Neck (C-Spine) Exam     Tenderness Right paramedian tenderness of the Lower L-Spine. Left paramedian tenderness of the Lower L-Spine.     Back (L-Spine & T-Spine) Range of Motion   Back extension: facet loading is positive and exacerabtes/reproduces the patient's typical low back pain    Back flexion: limited ROM but partial relief of low back pain noted.     Spinal Sensation   Right Side Sensation  L-Spine Level: normal  Left Side Sensation  L-Spine Level: normal    Other She has no scoliosis .      Muscle Strength   Right Lower Extremity   Hip Flexion: 5/5   Hip Extensors: 5/5  Quadriceps:  5/5   Hamstrin/5   Gastrocsoleus:  5/5   Left Lower Extremity   Hip Flexion: 5/5   Hip Extensors: 5/5  Quadriceps:  5/5   Hamstrin/5   Gastrocsoleus:  5/5     Reflexes     Left Side  Achilles:  2+  Quadriceps:  2+    Right Side   Achilles:  2+  Quadriceps:  2+      Imaging:  Patient presents external MRI from 2019 which shows L4-5 fusion with hardware and pedicle screws extending into the vertebra.  There is anterolisthesis of L4 on L5 with partial unroofing of the disc at that level.  There is significant degenerative disc disease at L3-4 resulting in central canal stenosis.  There is facet hypertrophy both above and below the fusion.    Labs:  BMP  Lab Results   Component Value Date     2014    K 3.6 2014     2014     CO2 26 01/09/2014    BUN 11 01/09/2014    CREATININE 1.0 12/09/2020    CALCIUM 10.5 01/09/2014    ANIONGAP 12 01/09/2014    ESTGFRAFRICA >60.0 12/09/2020    EGFRNONAA 55.6 (A) 12/09/2020     Lab Results   Component Value Date    ALT 16 01/09/2014    AST 24 01/09/2014    ALKPHOS 149 (H) 01/09/2014    BILITOT 0.5 01/09/2014       Assessment:  Problem List Items Addressed This Visit     Lumbar stenosis with neurogenic claudication    Relevant Medications    pregabalin (LYRICA) 75 MG capsule    DDD (degenerative disc disease), lumbar    Relevant Medications    pregabalin (LYRICA) 75 MG capsule    Lumbar radiculopathy    Relevant Medications    pregabalin (LYRICA) 75 MG capsule    S/P lumbar fusion - Primary          10/16/20 - Brandi Manuel is a 75 y.o. 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  She denies a trial of Lyrica, duloxetine, or spinal cord stimulation.  All 3 of these are excellent options for her and I have recommended starting with a more conservative of the three- Lyrica and duloxetine.  Given her history of anxiety and depression we will hold off on starting duloxetine for now.  I will start her on Lyrica 25 mg titration toward a goal of 25 mg t.i.d..  Once she reaches 25 mg t.i.d., I will transition her to 50 mg b.i.d. at which time she will follow up for assessment.  She is a candidate for spinal cord stimulation due to failed back surgical syndrome; however, the patient was advised that her chances of a highly successful trial and implantation are limited by central canal stenosis at L3-4.  It would certainly be appropriate for her to be evaluated by our surgical team here at [facility], and she has a pending evaluation with Dr. Singletary in December 2020.    12/18/2020 - this is a 75-year-old female with chronic low back pain  status post L4-5 fusion 2018 as described above.  She is currently on pregabalin 50 mg b.i.d. with improvement in her low back and radicular symptoms; however, she continues to have pain which she rates 7/10 today.  [facility] and received recommendation for repeat MRI lumbar spine with and without contrast.  I reviewed the note from Dr. Singletary and also see a recommendation for consideration of epidural spinal injection.  Patient would like to wait until after the new MRI is completed before scheduling another epidural.  Given her history of vasovagal response to a transforaminal epidural steroid injection (I was able to review the notes in the care everywhere tab of epic) I would recommend IV sedation.  I would also recommend administration of steroid via a caudal with catheter.  Advancing a catheter through the lumbosacral epidural space would likely provide less noxious stimulation than a transforaminal epidural steroid injection into an area previously subjected to surgery with subsequent development of scar tissue.    : Reviewed and consistent with medication use as prescribed.    Treatment Plan:   Procedures:    - consider Caudal XIANG with catheter to target L3-5   - She is a candidate for SCS trial if not surgical options are appropriate.  PT/OT/HEP:    - I encouraged the patient to start a home exercise regimen that includes daily, moderate cardiovascular exercise lasting at least 30 minutes.  This may include yoga, imani chi, walking, swimming, aqua aerobics, or other exercises that maintain a heart rate of 50-70% of the calculated maximum heart rate.  I also encouraged light, daily stretching focused on the target area.   - Patient restarted PT on her own at [facility], which I have encouraged her to continue  Medications:    - Cont Pregabalin and increase to 75 mg BID  Labs: reviewed and medications are appropriately dosed for current hepatorenal function.  Imaging: No  additional recommended at this time.    Follow Up: RTC 6 weeks    Nagi Augustine Jr, MD  Interventional Pain Medicine / Anesthesiology    Disclaimer: This note was partly generated using dictation software which may occasionally result in transcription errors.

## 2021-01-08 ENCOUNTER — OFFICE VISIT (OUTPATIENT)
Dept: ORTHOPEDICS | Facility: CLINIC | Age: 76
End: 2021-01-08
Payer: MEDICARE

## 2021-01-08 ENCOUNTER — HOSPITAL ENCOUNTER (OUTPATIENT)
Dept: RADIOLOGY | Facility: HOSPITAL | Age: 76
Discharge: HOME | End: 2021-01-08
Attending: ORTHOPAEDIC SURGERY
Payer: MEDICARE

## 2021-01-08 DIAGNOSIS — M43.27 FUSION OF SPINE, LUMBOSACRAL REGION: ICD-10-CM

## 2021-01-08 DIAGNOSIS — M48.062 SPINAL STENOSIS OF LUMBAR REGION WITH NEUROGENIC CLAUDICATION: Primary | ICD-10-CM

## 2021-01-08 PROCEDURE — 99212 OFFICE O/P EST SF 10 MIN: CPT | Mod: PBBFAC,25 | Performed by: ORTHOPAEDIC SURGERY

## 2021-01-08 PROCEDURE — 99999 PR PBB SHADOW E&M-EST. PATIENT-LVL II: CPT | Mod: PBBFAC,,, | Performed by: ORTHOPAEDIC SURGERY

## 2021-01-08 PROCEDURE — 72158 MRI LUMBAR SPINE W/O & W/DYE: CPT | Mod: 26,,, | Performed by: RADIOLOGY

## 2021-01-08 PROCEDURE — 72158 MRI LUMBAR SPINE W WO CONTRAST: ICD-10-PCS | Mod: 26,,, | Performed by: RADIOLOGY

## 2021-01-08 PROCEDURE — A9585 GADOBUTROL INJECTION: HCPCS | Performed by: ORTHOPAEDIC SURGERY

## 2021-01-08 PROCEDURE — 25500020 PHARM REV CODE 255: Performed by: ORTHOPAEDIC SURGERY

## 2021-01-08 PROCEDURE — 72158 MRI LUMBAR SPINE W/O & W/DYE: CPT | Mod: TC

## 2021-01-08 PROCEDURE — 99213 PR OFFICE/OUTPT VISIT, EST, LEVL III, 20-29 MIN: ICD-10-PCS | Mod: S$PBB,,, | Performed by: ORTHOPAEDIC SURGERY

## 2021-01-08 PROCEDURE — 99213 OFFICE O/P EST LOW 20 MIN: CPT | Mod: S$PBB,,, | Performed by: ORTHOPAEDIC SURGERY

## 2021-01-08 PROCEDURE — 99999 PR PBB SHADOW E&M-EST. PATIENT-LVL II: ICD-10-PCS | Mod: PBBFAC,,, | Performed by: ORTHOPAEDIC SURGERY

## 2021-01-08 RX ORDER — GADOBUTROL 604.72 MG/ML
8 INJECTION INTRAVENOUS
Status: COMPLETED | OUTPATIENT
Start: 2021-01-08 | End: 2021-01-08

## 2021-01-08 RX ORDER — MELOXICAM 15 MG/1
15 TABLET ORAL DAILY
Qty: 30 TABLET | Refills: 1 | Status: SHIPPED | OUTPATIENT
Start: 2021-01-08 | End: 2021-03-10 | Stop reason: SDUPTHER

## 2021-01-08 RX ADMIN — GADOBUTROL 8 ML: 604.72 INJECTION INTRAVENOUS at 10:01

## 2021-01-09 ENCOUNTER — IMMUNIZATION (OUTPATIENT)
Dept: PRIMARY CARE CLINIC | Facility: CLINIC | Age: 76
End: 2021-01-09
Payer: MEDICARE

## 2021-01-09 DIAGNOSIS — Z23 NEED FOR VACCINATION: ICD-10-CM

## 2021-01-09 PROCEDURE — 91300 COVID-19, MRNA, LNP-S, PF, 30 MCG/0.3 ML DOSE VACCINE: CPT | Mod: PBBFAC,PN

## 2021-01-30 ENCOUNTER — IMMUNIZATION (OUTPATIENT)
Dept: PRIMARY CARE CLINIC | Facility: CLINIC | Age: 76
End: 2021-01-30
Payer: MEDICARE

## 2021-01-30 DIAGNOSIS — Z23 NEED FOR VACCINATION: Primary | ICD-10-CM

## 2021-01-30 PROCEDURE — 91300 COVID-19, MRNA, LNP-S, PF, 30 MCG/0.3 ML DOSE VACCINE: CPT | Mod: PBBFAC,PN

## 2021-01-30 PROCEDURE — 0002A COVID-19, MRNA, LNP-S, PF, 30 MCG/0.3 ML DOSE VACCINE: CPT | Mod: PBBFAC,PN

## 2021-02-12 ENCOUNTER — OFFICE VISIT (OUTPATIENT)
Dept: PAIN MEDICINE | Facility: CLINIC | Age: 76
End: 2021-02-12
Payer: MEDICARE

## 2021-02-12 VITALS — WEIGHT: 165.13 LBS | SYSTOLIC BLOOD PRESSURE: 132 MMHG | DIASTOLIC BLOOD PRESSURE: 70 MMHG | BODY MASS INDEX: 30.2 KG/M2

## 2021-02-12 DIAGNOSIS — M48.062 LUMBAR STENOSIS WITH NEUROGENIC CLAUDICATION: ICD-10-CM

## 2021-02-12 DIAGNOSIS — M54.16 LUMBAR RADICULOPATHY: ICD-10-CM

## 2021-02-12 DIAGNOSIS — M51.36 DDD (DEGENERATIVE DISC DISEASE), LUMBAR: ICD-10-CM

## 2021-02-12 PROCEDURE — 99214 PR OFFICE/OUTPT VISIT, EST, LEVL IV, 30-39 MIN: ICD-10-PCS | Mod: S$PBB,,, | Performed by: PAIN MEDICINE

## 2021-02-12 PROCEDURE — 99214 OFFICE O/P EST MOD 30 MIN: CPT | Mod: S$PBB,,, | Performed by: PAIN MEDICINE

## 2021-02-12 PROCEDURE — 99999 PR PBB SHADOW E&M-EST. PATIENT-LVL III: ICD-10-PCS | Mod: PBBFAC,,, | Performed by: PAIN MEDICINE

## 2021-02-12 PROCEDURE — 99999 PR PBB SHADOW E&M-EST. PATIENT-LVL III: CPT | Mod: PBBFAC,,, | Performed by: PAIN MEDICINE

## 2021-02-12 PROCEDURE — 99213 OFFICE O/P EST LOW 20 MIN: CPT | Mod: PBBFAC,PN | Performed by: PAIN MEDICINE

## 2021-02-12 RX ORDER — PREGABALIN 75 MG/1
75 CAPSULE ORAL 2 TIMES DAILY
Qty: 60 CAPSULE | Refills: 11 | Status: SHIPPED | OUTPATIENT
Start: 2021-02-12 | End: 2021-07-22 | Stop reason: SDUPTHER

## 2021-02-12 RX ORDER — PREGABALIN 75 MG/1
75 CAPSULE ORAL 2 TIMES DAILY
Qty: 60 CAPSULE | Refills: 11 | Status: SHIPPED | OUTPATIENT
Start: 2021-02-12 | End: 2021-02-12 | Stop reason: SDUPTHER

## 2021-02-17 ENCOUNTER — OFFICE VISIT (OUTPATIENT)
Dept: PRIMARY CARE CLINIC | Facility: CLINIC | Age: 76
End: 2021-02-17
Payer: MEDICARE

## 2021-02-17 VITALS
DIASTOLIC BLOOD PRESSURE: 78 MMHG | SYSTOLIC BLOOD PRESSURE: 122 MMHG | HEART RATE: 70 BPM | BODY MASS INDEX: 30.91 KG/M2 | OXYGEN SATURATION: 99 % | WEIGHT: 168 LBS | TEMPERATURE: 99 F | HEIGHT: 62 IN

## 2021-02-17 DIAGNOSIS — K59.00 CONSTIPATION, UNSPECIFIED CONSTIPATION TYPE: ICD-10-CM

## 2021-02-17 DIAGNOSIS — E66.9 OBESITY (BMI 30-39.9): ICD-10-CM

## 2021-02-17 DIAGNOSIS — Z80.3 FAMILY HISTORY OF BREAST CANCER: ICD-10-CM

## 2021-02-17 DIAGNOSIS — Z23 ENCOUNTER FOR ADMINISTRATION OF VACCINE: ICD-10-CM

## 2021-02-17 DIAGNOSIS — Z80.0 FAMILY HISTORY OF COLON CANCER: ICD-10-CM

## 2021-02-17 DIAGNOSIS — Z12.31 SCREENING MAMMOGRAM, ENCOUNTER FOR: ICD-10-CM

## 2021-02-17 DIAGNOSIS — Z13.820 ENCOUNTER FOR OSTEOPOROSIS SCREENING IN ASYMPTOMATIC POSTMENOPAUSAL PATIENT: ICD-10-CM

## 2021-02-17 DIAGNOSIS — R23.2 HOT FLASH NOT DUE TO MENOPAUSE: ICD-10-CM

## 2021-02-17 DIAGNOSIS — Z78.0 ENCOUNTER FOR OSTEOPOROSIS SCREENING IN ASYMPTOMATIC POSTMENOPAUSAL PATIENT: ICD-10-CM

## 2021-02-17 DIAGNOSIS — Z00.00 ANNUAL PHYSICAL EXAM: Primary | ICD-10-CM

## 2021-02-17 PROCEDURE — 99214 OFFICE O/P EST MOD 30 MIN: CPT | Mod: S$PBB,,, | Performed by: FAMILY MEDICINE

## 2021-02-17 PROCEDURE — 99214 OFFICE O/P EST MOD 30 MIN: CPT | Mod: PBBFAC,PN | Performed by: FAMILY MEDICINE

## 2021-02-17 PROCEDURE — 99214 PR OFFICE/OUTPT VISIT, EST, LEVL IV, 30-39 MIN: ICD-10-PCS | Mod: S$PBB,,, | Performed by: FAMILY MEDICINE

## 2021-02-17 PROCEDURE — 99999 PR PBB SHADOW E&M-EST. PATIENT-LVL IV: ICD-10-PCS | Mod: PBBFAC,,, | Performed by: FAMILY MEDICINE

## 2021-02-17 PROCEDURE — 99999 PR PBB SHADOW E&M-EST. PATIENT-LVL IV: CPT | Mod: PBBFAC,,, | Performed by: FAMILY MEDICINE

## 2021-02-17 PROCEDURE — 90714 TD VACC NO PRESV 7 YRS+ IM: CPT | Mod: PBBFAC,PN

## 2021-02-17 RX ORDER — POLYETHYLENE GLYCOL 3350 17 G/17G
17 POWDER, FOR SOLUTION ORAL DAILY
Qty: 72 EACH | Refills: 2 | Status: SHIPPED | OUTPATIENT
Start: 2021-02-17 | End: 2022-10-12

## 2021-02-24 ENCOUNTER — HOSPITAL ENCOUNTER (OUTPATIENT)
Dept: RADIOLOGY | Facility: HOSPITAL | Age: 76
Discharge: HOME | End: 2021-02-24
Attending: FAMILY MEDICINE
Payer: MEDICARE

## 2021-02-24 DIAGNOSIS — Z12.31 SCREENING MAMMOGRAM, ENCOUNTER FOR: ICD-10-CM

## 2021-02-24 PROCEDURE — 77063 MAMMO DIGITAL SCREENING BILAT WITH TOMO: ICD-10-PCS | Mod: 26,,, | Performed by: RADIOLOGY

## 2021-02-24 PROCEDURE — 77067 SCR MAMMO BI INCL CAD: CPT | Mod: 26,,, | Performed by: RADIOLOGY

## 2021-02-24 PROCEDURE — 77067 MAMMO DIGITAL SCREENING BILAT WITH TOMO: ICD-10-PCS | Mod: 26,,, | Performed by: RADIOLOGY

## 2021-02-24 PROCEDURE — 77063 BREAST TOMOSYNTHESIS BI: CPT | Mod: 26,,, | Performed by: RADIOLOGY

## 2021-02-24 PROCEDURE — 77067 SCR MAMMO BI INCL CAD: CPT | Mod: TC,PN

## 2021-03-12 ENCOUNTER — TELEPHONE (OUTPATIENT)
Dept: PRIMARY CARE CLINIC | Facility: CLINIC | Age: 76
End: 2021-03-12

## 2021-03-12 DIAGNOSIS — H81.13 BENIGN PAROXYSMAL VERTIGO, BILATERAL: ICD-10-CM

## 2021-03-12 RX ORDER — MECLIZINE HYDROCHLORIDE 25 MG/1
25 TABLET ORAL 2 TIMES DAILY PRN
Qty: 60 TABLET | Refills: 0 | Status: SHIPPED | OUTPATIENT
Start: 2021-03-12 | End: 2021-06-07 | Stop reason: SDUPTHER

## 2021-03-27 ENCOUNTER — OFFICE VISIT (OUTPATIENT)
Dept: URGENT CARE | Facility: CLINIC | Age: 76
End: 2021-03-27
Payer: MEDICARE

## 2021-03-27 VITALS
RESPIRATION RATE: 16 BRPM | DIASTOLIC BLOOD PRESSURE: 68 MMHG | TEMPERATURE: 97 F | BODY MASS INDEX: 29.44 KG/M2 | HEART RATE: 64 BPM | WEIGHT: 160 LBS | HEIGHT: 62 IN | OXYGEN SATURATION: 98 % | SYSTOLIC BLOOD PRESSURE: 115 MMHG

## 2021-03-27 DIAGNOSIS — H61.21 IMPACTED CERUMEN OF RIGHT EAR: Primary | ICD-10-CM

## 2021-03-27 PROCEDURE — 99214 OFFICE O/P EST MOD 30 MIN: CPT | Mod: S$GLB,,, | Performed by: FAMILY MEDICINE

## 2021-03-27 PROCEDURE — 99214 PR OFFICE/OUTPT VISIT, EST, LEVL IV, 30-39 MIN: ICD-10-PCS | Mod: S$GLB,,, | Performed by: FAMILY MEDICINE

## 2021-06-07 ENCOUNTER — OFFICE VISIT (OUTPATIENT)
Dept: PRIMARY CARE CLINIC | Facility: CLINIC | Age: 76
End: 2021-06-07
Payer: MEDICARE

## 2021-06-07 VITALS — DIASTOLIC BLOOD PRESSURE: 80 MMHG | SYSTOLIC BLOOD PRESSURE: 124 MMHG | BODY MASS INDEX: 29.26 KG/M2 | HEIGHT: 62 IN

## 2021-06-07 DIAGNOSIS — H81.13 BENIGN PAROXYSMAL VERTIGO, BILATERAL: ICD-10-CM

## 2021-06-07 DIAGNOSIS — M48.062 LUMBAR STENOSIS WITH NEUROGENIC CLAUDICATION: ICD-10-CM

## 2021-06-07 DIAGNOSIS — T14.8XXA SUPERFICIAL INJURY OF SKIN: Primary | ICD-10-CM

## 2021-06-07 PROCEDURE — 99999 PR PBB SHADOW E&M-EST. PATIENT-LVL III: ICD-10-PCS | Mod: PBBFAC,,, | Performed by: FAMILY MEDICINE

## 2021-06-07 PROCEDURE — 99999 PR PBB SHADOW E&M-EST. PATIENT-LVL III: CPT | Mod: PBBFAC,,, | Performed by: FAMILY MEDICINE

## 2021-06-07 PROCEDURE — 99213 PR OFFICE/OUTPT VISIT, EST, LEVL III, 20-29 MIN: ICD-10-PCS | Mod: S$PBB,,, | Performed by: FAMILY MEDICINE

## 2021-06-07 PROCEDURE — 99213 OFFICE O/P EST LOW 20 MIN: CPT | Mod: PBBFAC,PN | Performed by: FAMILY MEDICINE

## 2021-06-07 PROCEDURE — 99213 OFFICE O/P EST LOW 20 MIN: CPT | Mod: S$PBB,,, | Performed by: FAMILY MEDICINE

## 2021-06-07 RX ORDER — MELOXICAM 15 MG/1
15 TABLET ORAL DAILY
Qty: 30 TABLET | Refills: 1 | Status: SHIPPED | OUTPATIENT
Start: 2021-06-07 | End: 2021-07-20 | Stop reason: SDUPTHER

## 2021-06-07 RX ORDER — MECLIZINE HYDROCHLORIDE 25 MG/1
25 TABLET ORAL 2 TIMES DAILY PRN
Qty: 90 TABLET | Refills: 0 | Status: SHIPPED | OUTPATIENT
Start: 2021-06-07 | End: 2022-06-09 | Stop reason: SDUPTHER

## 2021-07-08 ENCOUNTER — OFFICE VISIT (OUTPATIENT)
Dept: URGENT CARE | Facility: CLINIC | Age: 76
End: 2021-07-08
Payer: MEDICARE

## 2021-07-08 VITALS
OXYGEN SATURATION: 98 % | WEIGHT: 165 LBS | DIASTOLIC BLOOD PRESSURE: 71 MMHG | RESPIRATION RATE: 18 BRPM | HEIGHT: 62 IN | BODY MASS INDEX: 30.36 KG/M2 | SYSTOLIC BLOOD PRESSURE: 158 MMHG | TEMPERATURE: 98 F | HEART RATE: 78 BPM

## 2021-07-08 DIAGNOSIS — H65.02 NON-RECURRENT ACUTE SEROUS OTITIS MEDIA OF LEFT EAR: Primary | ICD-10-CM

## 2021-07-08 PROCEDURE — 99214 PR OFFICE/OUTPT VISIT, EST, LEVL IV, 30-39 MIN: ICD-10-PCS | Mod: S$GLB,,, | Performed by: NURSE PRACTITIONER

## 2021-07-08 PROCEDURE — 99214 OFFICE O/P EST MOD 30 MIN: CPT | Mod: S$GLB,,, | Performed by: NURSE PRACTITIONER

## 2021-07-08 RX ORDER — PREDNISONE 20 MG/1
40 TABLET ORAL DAILY
Qty: 10 TABLET | Refills: 0 | Status: SHIPPED | OUTPATIENT
Start: 2021-07-08 | End: 2021-07-13

## 2021-07-12 ENCOUNTER — TELEPHONE (OUTPATIENT)
Dept: ORTHOPEDICS | Facility: CLINIC | Age: 76
End: 2021-07-12

## 2021-07-16 DIAGNOSIS — M25.562 LEFT KNEE PAIN, UNSPECIFIED CHRONICITY: Primary | ICD-10-CM

## 2021-07-21 ENCOUNTER — PATIENT OUTREACH (OUTPATIENT)
Dept: ADMINISTRATIVE | Facility: OTHER | Age: 76
End: 2021-07-21

## 2021-07-21 DIAGNOSIS — M51.36 DDD (DEGENERATIVE DISC DISEASE), LUMBAR: ICD-10-CM

## 2021-07-21 DIAGNOSIS — M48.062 LUMBAR STENOSIS WITH NEUROGENIC CLAUDICATION: ICD-10-CM

## 2021-07-21 DIAGNOSIS — M54.16 LUMBAR RADICULOPATHY: ICD-10-CM

## 2021-07-22 ENCOUNTER — HOSPITAL ENCOUNTER (OUTPATIENT)
Dept: RADIOLOGY | Facility: HOSPITAL | Age: 76
Discharge: HOME | End: 2021-07-22
Attending: ORTHOPAEDIC SURGERY
Payer: MEDICARE

## 2021-07-22 ENCOUNTER — OFFICE VISIT (OUTPATIENT)
Dept: ORTHOPEDICS | Facility: CLINIC | Age: 76
End: 2021-07-22
Payer: MEDICARE

## 2021-07-22 VITALS — BODY MASS INDEX: 30.39 KG/M2 | WEIGHT: 165.13 LBS | HEIGHT: 62 IN

## 2021-07-22 DIAGNOSIS — M48.062 LUMBAR STENOSIS WITH NEUROGENIC CLAUDICATION: ICD-10-CM

## 2021-07-22 DIAGNOSIS — M25.562 LEFT KNEE PAIN, UNSPECIFIED CHRONICITY: ICD-10-CM

## 2021-07-22 DIAGNOSIS — M17.0 ARTHRITIS OF BOTH KNEES: Primary | ICD-10-CM

## 2021-07-22 DIAGNOSIS — M54.16 LUMBAR RADICULOPATHY: ICD-10-CM

## 2021-07-22 DIAGNOSIS — M51.36 DDD (DEGENERATIVE DISC DISEASE), LUMBAR: ICD-10-CM

## 2021-07-22 PROCEDURE — 20610 DRAIN/INJ JOINT/BURSA W/O US: CPT | Mod: PBBFAC,PN | Performed by: ORTHOPAEDIC SURGERY

## 2021-07-22 PROCEDURE — 73564 X-RAY EXAM KNEE 4 OR MORE: CPT | Mod: 26,LT,, | Performed by: RADIOLOGY

## 2021-07-22 PROCEDURE — 99213 OFFICE O/P EST LOW 20 MIN: CPT | Mod: PBBFAC,PN,25 | Performed by: ORTHOPAEDIC SURGERY

## 2021-07-22 PROCEDURE — 99999 PR PBB SHADOW E&M-EST. PATIENT-LVL III: ICD-10-PCS | Mod: PBBFAC,,, | Performed by: ORTHOPAEDIC SURGERY

## 2021-07-22 PROCEDURE — 73562 X-RAY EXAM OF KNEE 3: CPT | Mod: 26,RT,, | Performed by: RADIOLOGY

## 2021-07-22 PROCEDURE — 73564 X-RAY EXAM KNEE 4 OR MORE: CPT | Mod: TC,PN,LT

## 2021-07-22 PROCEDURE — 99999 PR PBB SHADOW E&M-EST. PATIENT-LVL III: CPT | Mod: PBBFAC,,, | Performed by: ORTHOPAEDIC SURGERY

## 2021-07-22 PROCEDURE — 73562 XR KNEE ORTHO LEFT WITH FLEXION: ICD-10-PCS | Mod: 26,RT,, | Performed by: RADIOLOGY

## 2021-07-22 PROCEDURE — 99214 PR OFFICE/OUTPT VISIT, EST, LEVL IV, 30-39 MIN: ICD-10-PCS | Mod: 25,S$PBB,, | Performed by: ORTHOPAEDIC SURGERY

## 2021-07-22 PROCEDURE — 73564 XR KNEE ORTHO LEFT WITH FLEXION: ICD-10-PCS | Mod: 26,LT,, | Performed by: RADIOLOGY

## 2021-07-22 PROCEDURE — 99214 OFFICE O/P EST MOD 30 MIN: CPT | Mod: 25,S$PBB,, | Performed by: ORTHOPAEDIC SURGERY

## 2021-07-22 PROCEDURE — 20610 LARGE JOINT ASPIRATION/INJECTION: R KNEE: ICD-10-PCS | Mod: S$PBB,RT,, | Performed by: ORTHOPAEDIC SURGERY

## 2021-07-22 RX ORDER — PREGABALIN 75 MG/1
75 CAPSULE ORAL 2 TIMES DAILY
Qty: 60 CAPSULE | Refills: 11 | Status: SHIPPED | OUTPATIENT
Start: 2021-07-22 | End: 2022-06-09

## 2021-07-22 RX ORDER — PREGABALIN 75 MG/1
75 CAPSULE ORAL 2 TIMES DAILY
Qty: 60 CAPSULE | Refills: 5 | OUTPATIENT
Start: 2021-07-22 | End: 2021-08-21

## 2021-07-22 RX ADMIN — TRIAMCINOLONE ACETONIDE 40 MG: 40 INJECTION, SUSPENSION INTRA-ARTICULAR; INTRAMUSCULAR at 02:07

## 2021-07-27 ENCOUNTER — CLINICAL SUPPORT (OUTPATIENT)
Dept: AUDIOLOGY | Facility: CLINIC | Age: 76
End: 2021-07-27
Payer: MEDICARE

## 2021-07-27 ENCOUNTER — OFFICE VISIT (OUTPATIENT)
Dept: OTOLARYNGOLOGY | Facility: CLINIC | Age: 76
End: 2021-07-27
Payer: MEDICARE

## 2021-07-27 VITALS — BODY MASS INDEX: 31.61 KG/M2 | WEIGHT: 172.81 LBS

## 2021-07-27 DIAGNOSIS — R42 DIZZINESS AND GIDDINESS: Primary | ICD-10-CM

## 2021-07-27 DIAGNOSIS — H81.13 BENIGN PAROXYSMAL VERTIGO, BILATERAL: ICD-10-CM

## 2021-07-27 PROCEDURE — 99203 PR OFFICE/OUTPT VISIT, NEW, LEVL III, 30-44 MIN: ICD-10-PCS | Mod: S$PBB,,, | Performed by: OTOLARYNGOLOGY

## 2021-07-27 PROCEDURE — 92557 COMPREHENSIVE HEARING TEST: CPT | Mod: PBBFAC | Performed by: AUDIOLOGIST

## 2021-07-27 PROCEDURE — 99203 OFFICE O/P NEW LOW 30 MIN: CPT | Mod: S$PBB,,, | Performed by: OTOLARYNGOLOGY

## 2021-07-27 PROCEDURE — 92567 TYMPANOMETRY: CPT | Mod: PBBFAC | Performed by: AUDIOLOGIST

## 2021-07-27 PROCEDURE — 99999 PR PBB SHADOW E&M-EST. PATIENT-LVL I: ICD-10-PCS | Mod: PBBFAC,,, | Performed by: AUDIOLOGIST

## 2021-07-27 PROCEDURE — 99211 OFF/OP EST MAY X REQ PHY/QHP: CPT | Mod: PBBFAC | Performed by: AUDIOLOGIST

## 2021-07-27 PROCEDURE — 99999 PR PBB SHADOW E&M-EST. PATIENT-LVL III: ICD-10-PCS | Mod: PBBFAC,,, | Performed by: OTOLARYNGOLOGY

## 2021-07-27 PROCEDURE — 99999 PR PBB SHADOW E&M-EST. PATIENT-LVL III: CPT | Mod: PBBFAC,,, | Performed by: OTOLARYNGOLOGY

## 2021-07-27 PROCEDURE — 99213 OFFICE O/P EST LOW 20 MIN: CPT | Mod: PBBFAC,27 | Performed by: OTOLARYNGOLOGY

## 2021-07-27 PROCEDURE — 99999 PR PBB SHADOW E&M-EST. PATIENT-LVL I: CPT | Mod: PBBFAC,,, | Performed by: AUDIOLOGIST

## 2021-07-27 RX ORDER — AZITHROMYCIN 250 MG/1
TABLET, FILM COATED ORAL
COMMUNITY
Start: 2021-04-07 | End: 2022-06-09

## 2021-07-27 RX ORDER — ESCITALOPRAM OXALATE 20 MG
20 TABLET ORAL DAILY
COMMUNITY
Start: 2021-07-23 | End: 2022-10-19

## 2021-07-27 RX ORDER — IBUPROFEN 600 MG/1
600 TABLET ORAL EVERY 6 HOURS PRN
COMMUNITY
Start: 2021-04-07 | End: 2022-10-19

## 2021-08-16 ENCOUNTER — TELEPHONE (OUTPATIENT)
Dept: PRIMARY CARE CLINIC | Facility: CLINIC | Age: 76
End: 2021-08-16

## 2021-08-25 DIAGNOSIS — Z11.59 NEED FOR HEPATITIS C SCREENING TEST: ICD-10-CM

## 2021-11-01 RX ORDER — TRIAMCINOLONE ACETONIDE 40 MG/ML
40 INJECTION, SUSPENSION INTRA-ARTICULAR; INTRAMUSCULAR
Status: DISCONTINUED | OUTPATIENT
Start: 2021-07-22 | End: 2021-11-01 | Stop reason: HOSPADM

## 2022-03-28 ENCOUNTER — PES CALL (OUTPATIENT)
Dept: ADMINISTRATIVE | Facility: CLINIC | Age: 77
End: 2022-03-28
Payer: MEDICARE

## 2022-04-14 ENCOUNTER — PATIENT OUTREACH (OUTPATIENT)
Dept: ADMINISTRATIVE | Facility: HOSPITAL | Age: 77
End: 2022-04-14
Payer: MEDICARE

## 2022-04-14 DIAGNOSIS — Z12.31 ENCOUNTER FOR SCREENING MAMMOGRAM FOR MALIGNANT NEOPLASM OF BREAST: Primary | ICD-10-CM

## 2022-05-23 ENCOUNTER — TELEPHONE (OUTPATIENT)
Dept: PRIMARY CARE CLINIC | Facility: CLINIC | Age: 77
End: 2022-05-23
Payer: MEDICARE

## 2022-05-23 DIAGNOSIS — U07.1 COVID-19: Primary | ICD-10-CM

## 2022-05-23 DIAGNOSIS — U07.1 COVID: ICD-10-CM

## 2022-05-23 RX ORDER — BENZONATATE 200 MG/1
200 CAPSULE ORAL 3 TIMES DAILY PRN
Qty: 30 CAPSULE | Refills: 0 | Status: SHIPPED | OUTPATIENT
Start: 2022-05-23 | End: 2022-06-02

## 2022-05-23 RX ORDER — PROMETHAZINE HYDROCHLORIDE AND DEXTROMETHORPHAN HYDROBROMIDE 6.25; 15 MG/5ML; MG/5ML
5 SYRUP ORAL EVERY 4 HOURS PRN
Qty: 118 ML | Refills: 0 | Status: SHIPPED | OUTPATIENT
Start: 2022-05-23 | End: 2022-06-02

## 2022-05-23 NOTE — TELEPHONE ENCOUNTER
Patient tested positive for COVID-19 today on 05/23/2022.  Has been having symptoms since May 16, having a scratchy throat, chest heaviness, coughing.  Denies any dyspnea.  Patient thinks she got COVID-19 after attending a wedding in [facility] 2 weekends ago.  COVID-19 risk score is 3, will send patient to get COVID 19

## 2022-05-25 ENCOUNTER — INFUSION (OUTPATIENT)
Dept: INFECTIOUS DISEASES | Facility: HOSPITAL | Age: 77
End: 2022-05-25
Attending: INTERNAL MEDICINE
Payer: MEDICARE

## 2022-05-25 VITALS
HEART RATE: 63 BPM | BODY MASS INDEX: 32.2 KG/M2 | SYSTOLIC BLOOD PRESSURE: 180 MMHG | DIASTOLIC BLOOD PRESSURE: 78 MMHG | TEMPERATURE: 98 F | WEIGHT: 175 LBS | OXYGEN SATURATION: 99 % | HEIGHT: 62 IN | RESPIRATION RATE: 18 BRPM

## 2022-05-25 DIAGNOSIS — U07.1 COVID-19: Primary | ICD-10-CM

## 2022-05-25 PROCEDURE — M0222 HC IV INJECTION, BEBTELOVIMAB, INCL POST ADMIN MONIT: HCPCS | Performed by: INTERNAL MEDICINE

## 2022-05-25 PROCEDURE — 63600175 PHARM REV CODE 636 W HCPCS: Performed by: INTERNAL MEDICINE

## 2022-05-25 RX ORDER — ACETAMINOPHEN 325 MG/1
650 TABLET ORAL
Status: ACTIVE | OUTPATIENT
Start: 2022-05-25 | End: 2022-05-26

## 2022-05-25 RX ORDER — BEBTELOVIMAB 87.5 MG/ML
175 INJECTION, SOLUTION INTRAVENOUS
Status: COMPLETED | OUTPATIENT
Start: 2022-05-25 | End: 2022-05-25

## 2022-05-25 RX ORDER — DIPHENHYDRAMINE HYDROCHLORIDE 50 MG/ML
25 INJECTION INTRAMUSCULAR; INTRAVENOUS
Status: ACTIVE | OUTPATIENT
Start: 2022-05-25 | End: 2022-05-26

## 2022-05-25 RX ORDER — ALBUTEROL SULFATE 90 UG/1
2 AEROSOL, METERED RESPIRATORY (INHALATION)
Status: ACTIVE | OUTPATIENT
Start: 2022-05-25 | End: 2022-05-28

## 2022-05-25 RX ORDER — EPINEPHRINE 0.3 MG/.3ML
0.3 INJECTION SUBCUTANEOUS
Status: ACTIVE | OUTPATIENT
Start: 2022-05-25 | End: 2022-05-28

## 2022-05-25 RX ORDER — ONDANSETRON 4 MG/1
4 TABLET, ORALLY DISINTEGRATING ORAL
Status: ACTIVE | OUTPATIENT
Start: 2022-05-25 | End: 2022-05-26

## 2022-05-25 RX ADMIN — BEBTELOVIMAB 175 MG: 87.5 INJECTION, SOLUTION INTRAVENOUS at 08:05

## 2022-05-25 NOTE — PROGRESS NOTES
Patient arrives for Bebtelovimab IV Injection. Ambulatory. Pt AAox3. No distress noted. RR even and unlabored.     Symptoms and onset date:  Flu-like symptoms     Tested COVID + on 05/23/22

## 2022-06-08 ENCOUNTER — TELEPHONE (OUTPATIENT)
Dept: PRIMARY CARE CLINIC | Facility: CLINIC | Age: 77
End: 2022-06-08
Payer: MEDICARE

## 2022-06-08 NOTE — TELEPHONE ENCOUNTER
----- Message from Jody Bates sent at 6/8/2022 11:52 AM CDT -----  Contact: [phone] Patient  Pt is requesting a call back from the office about her 06/09 appt due to Covid concern. Pt states she had Covid a few weeks ago.

## 2022-06-08 NOTE — TELEPHONE ENCOUNTER
Patient advised that it is fine for her to come in clinic tomorrow for her appointment. Patient verbalized understanding to all information noted above.

## 2022-06-09 ENCOUNTER — OFFICE VISIT (OUTPATIENT)
Dept: PRIMARY CARE CLINIC | Facility: CLINIC | Age: 77
End: 2022-06-09
Payer: MEDICARE

## 2022-06-09 ENCOUNTER — LAB VISIT (OUTPATIENT)
Dept: PRIMARY CARE CLINIC | Facility: CLINIC | Age: 77
End: 2022-06-09
Payer: MEDICARE

## 2022-06-09 VITALS
DIASTOLIC BLOOD PRESSURE: 72 MMHG | HEART RATE: 72 BPM | WEIGHT: 157.63 LBS | BODY MASS INDEX: 28.83 KG/M2 | OXYGEN SATURATION: 100 % | SYSTOLIC BLOOD PRESSURE: 173 MMHG

## 2022-06-09 DIAGNOSIS — H81.13 BENIGN PAROXYSMAL VERTIGO, BILATERAL: ICD-10-CM

## 2022-06-09 DIAGNOSIS — Z13.220 ENCOUNTER FOR LIPID SCREENING FOR CARDIOVASCULAR DISEASE: ICD-10-CM

## 2022-06-09 DIAGNOSIS — Z13.6 ENCOUNTER FOR LIPID SCREENING FOR CARDIOVASCULAR DISEASE: ICD-10-CM

## 2022-06-09 DIAGNOSIS — Z11.59 NEED FOR HEPATITIS C SCREENING TEST: ICD-10-CM

## 2022-06-09 DIAGNOSIS — R03.0 ELEVATED BLOOD PRESSURE READING WITHOUT DIAGNOSIS OF HYPERTENSION: ICD-10-CM

## 2022-06-09 DIAGNOSIS — R73.03 PREDIABETES: ICD-10-CM

## 2022-06-09 DIAGNOSIS — R03.0 ELEVATED BLOOD PRESSURE READING WITHOUT DIAGNOSIS OF HYPERTENSION: Primary | ICD-10-CM

## 2022-06-09 DIAGNOSIS — R21 RASH AND NONSPECIFIC SKIN ERUPTION: ICD-10-CM

## 2022-06-09 LAB
ALBUMIN SERPL BCP-MCNC: 4.1 G/DL (ref 3.5–5.2)
ALP SERPL-CCNC: 105 U/L (ref 55–135)
ALT SERPL W/O P-5'-P-CCNC: 9 U/L (ref 10–44)
ANION GAP SERPL CALC-SCNC: 12 MMOL/L (ref 8–16)
AST SERPL-CCNC: 26 U/L (ref 10–40)
BILIRUB SERPL-MCNC: 0.4 MG/DL (ref 0.1–1)
BUN SERPL-MCNC: 18 MG/DL (ref 8–23)
CALCIUM SERPL-MCNC: 9.8 MG/DL (ref 8.7–10.5)
CHLORIDE SERPL-SCNC: 102 MMOL/L (ref 95–110)
CHOLEST SERPL-MCNC: 198 MG/DL (ref 120–199)
CHOLEST/HDLC SERPL: 4.5 {RATIO} (ref 2–5)
CO2 SERPL-SCNC: 24 MMOL/L (ref 23–29)
CREAT SERPL-MCNC: 1.1 MG/DL (ref 0.5–1.4)
EST. GFR  (AFRICAN AMERICAN): 56.8 ML/MIN/1.73 M^2
EST. GFR  (NON AFRICAN AMERICAN): 49.2 ML/MIN/1.73 M^2
ESTIMATED AVG GLUCOSE: 117 MG/DL (ref 68–131)
GLUCOSE SERPL-MCNC: 89 MG/DL (ref 70–110)
HBA1C MFR BLD: 5.7 % (ref 4–5.6)
HDLC SERPL-MCNC: 44 MG/DL (ref 40–75)
HDLC SERPL: 22.2 % (ref 20–50)
LDLC SERPL CALC-MCNC: 133.6 MG/DL (ref 63–159)
NONHDLC SERPL-MCNC: 154 MG/DL
POTASSIUM SERPL-SCNC: 4.6 MMOL/L (ref 3.5–5.1)
PROT SERPL-MCNC: 8.1 G/DL (ref 6–8.4)
SODIUM SERPL-SCNC: 138 MMOL/L (ref 136–145)
TRIGL SERPL-MCNC: 102 MG/DL (ref 30–150)

## 2022-06-09 PROCEDURE — 99214 PR OFFICE/OUTPT VISIT, EST, LEVL IV, 30-39 MIN: ICD-10-PCS | Mod: S$PBB,,, | Performed by: FAMILY MEDICINE

## 2022-06-09 PROCEDURE — 99999 PR PBB SHADOW E&M-EST. PATIENT-LVL III: ICD-10-PCS | Mod: PBBFAC,,, | Performed by: FAMILY MEDICINE

## 2022-06-09 PROCEDURE — 83036 HEMOGLOBIN GLYCOSYLATED A1C: CPT | Performed by: FAMILY MEDICINE

## 2022-06-09 PROCEDURE — 99214 OFFICE O/P EST MOD 30 MIN: CPT | Mod: S$PBB,,, | Performed by: FAMILY MEDICINE

## 2022-06-09 PROCEDURE — 86803 HEPATITIS C AB TEST: CPT | Performed by: FAMILY MEDICINE

## 2022-06-09 PROCEDURE — 99999 PR PBB SHADOW E&M-EST. PATIENT-LVL III: CPT | Mod: PBBFAC,,, | Performed by: FAMILY MEDICINE

## 2022-06-09 PROCEDURE — 36415 COLL VENOUS BLD VENIPUNCTURE: CPT | Mod: PBBFAC,PN

## 2022-06-09 PROCEDURE — 80053 COMPREHEN METABOLIC PANEL: CPT | Performed by: FAMILY MEDICINE

## 2022-06-09 PROCEDURE — 99213 OFFICE O/P EST LOW 20 MIN: CPT | Mod: PBBFAC,PN | Performed by: FAMILY MEDICINE

## 2022-06-09 PROCEDURE — 80061 LIPID PANEL: CPT | Performed by: FAMILY MEDICINE

## 2022-06-09 RX ORDER — MECLIZINE HYDROCHLORIDE 25 MG/1
25 TABLET ORAL 2 TIMES DAILY PRN
Qty: 90 TABLET | Refills: 0 | Status: SHIPPED | OUTPATIENT
Start: 2022-06-09 | End: 2022-10-13 | Stop reason: SDUPTHER

## 2022-06-09 RX ORDER — TRIAMCINOLONE ACETONIDE 1 MG/G
OINTMENT TOPICAL 3 TIMES DAILY
Qty: 453.6 G | Refills: 3 | Status: SHIPPED | OUTPATIENT
Start: 2022-06-09 | End: 2023-08-07 | Stop reason: SDUPTHER

## 2022-06-09 NOTE — PROGRESS NOTES
Clinic Note  6/9/2022      Subjective:       Patient ID:  Brandi is a 76 y.o. female being seen for an established visit.    Chief Complaint: Annual Exam    Annual exam-patient with a history of degenerative disc disease, lumbar radiculopathy, lumbar fusion, benign paroxysmal vertigo, hyperlipidemia, insomnia, PTSD here for annual exam    History of COVID-patient with history of COVID-19 several weeks ago, was given antibody infusion.  Since then has been doing well    PTSD and insomnia-being seen by psychiatrist, stable on Lexapro, Ambien, Remeron p.r.n.    Benign paroxysmal vertigo-been seen by ENT the past and symptoms improved after Epley maneuver.  Just do the Epley maneuver every now and then for dizziness.  Would like a refill of her meclizine which patient takes sparingly for the dizziness        Review of Systems   Constitutional: Negative for chills, fever, malaise/fatigue and weight loss.   HENT: Negative for congestion, sinus pain and sore throat.    Respiratory: Negative for cough, shortness of breath and wheezing.    Cardiovascular: Negative for chest pain and palpitations.   Gastrointestinal: Negative for constipation, diarrhea, nausea and vomiting.   Genitourinary: Negative for dysuria, frequency and urgency.   Musculoskeletal: Negative for myalgias.   Skin: Negative for rash.   Neurological: Negative for headaches.       Medication List with Changes/Refills   Current Medications    ACETAMINOPHEN (TYLENOL) 500 MG TABLET    Take 2 tablets (1,000 mg total) by mouth every 8 (eight) hours as needed for Pain.    IBUPROFEN (ADVIL,MOTRIN) 600 MG TABLET    Take 600 mg by mouth every 6 (six) hours as needed.    LEXAPRO 20 MG TABLET    Take 20 mg by mouth once daily.    MELOXICAM (MOBIC) 15 MG TABLET    Take 1 tablet (15 mg total) by mouth once daily.    MIRTAZAPINE (REMERON) 7.5 MG TAB    Take 7.5 mg by mouth every evening.    POLYETHYLENE GLYCOL (GLYCOLAX) 17 GRAM PWPK    Take 17 g by mouth once daily.     "ZOLPIDEM (AMBIEN CR) 12.5 MG CR TABLET    Take 12.5 mg by mouth.   Changed and/or Refilled Medications    Modified Medication Previous Medication    MECLIZINE (ANTIVERT) 25 MG TABLET meclizine (ANTIVERT) 25 mg tablet       Take 1 tablet (25 mg total) by mouth 2 (two) times daily as needed for Dizziness.    Take 1 tablet (25 mg total) by mouth 2 (two) times daily as needed for Dizziness.    TRIAMCINOLONE ACETONIDE 0.1% (KENALOG) 0.1 % OINTMENT triamcinolone acetonide 0.1% (KENALOG) 0.1 % ointment       Apply topically 3 (three) times daily.    Apply topically 3 (three) times daily.   Discontinued Medications    AZITHROMYCIN (Z-REBECCA) 250 MG TABLET    TAKE BY MOUTH AS DIRECTED PER PACKAGE DIRECTIONS    ESCITALOPRAM OXALATE (LEXAPRO) 10 MG TABLET    Take by mouth.    PREGABALIN (LYRICA) 75 MG CAPSULE    Take 1 capsule (75 mg total) by mouth 2 (two) times daily.       Patient Active Problem List   Diagnosis    Benign paroxysmal vertigo, bilateral    Dermatitis    Generalized muscle weakness    Hearing loss    Hot flash, menopausal    Hyperlipidemia    IFG (impaired fasting glucose)    Insomnia    Low back pain    Lumbar stenosis with neurogenic claudication    Osteoarthritis    Other depressive disorder    Posttraumatic stress disorder    Pure hypercholesterolemia, unspecified    Right leg paresthesias    Sciatica    Spondylolisthesis at L4-L5 level    DDD (degenerative disc disease), lumbar    Lumbar radiculopathy    S/P lumbar fusion    Obesity (BMI 30-39.9)    Hot flash not due to menopause    Family history of colon cancer    Family history of breast cancer    COVID           Objective:      BP (!) 173/72 (BP Location: Left arm, Patient Position: Sitting, BP Method: Large (Automatic))   Pulse 72   Wt 71.5 kg (157 lb 10.1 oz)   SpO2 100%   BMI 28.83 kg/m²   Estimated body mass index is 28.83 kg/m² as calculated from the following:    Height as of 5/25/22: 5' 2" (1.575 m).    Weight as of " this encounter: 71.5 kg (157 lb 10.1 oz).  Physical Exam  Vitals reviewed.   Constitutional:       General: She is not in acute distress.     Appearance: She is not diaphoretic.   HENT:      Head: Normocephalic and atraumatic.   Eyes:      Conjunctiva/sclera: Conjunctivae normal.   Cardiovascular:      Rate and Rhythm: Normal rate and regular rhythm.      Heart sounds: Normal heart sounds.   Pulmonary:      Effort: Pulmonary effort is normal. No respiratory distress.      Breath sounds: Normal breath sounds. No wheezing.   Abdominal:      General: Bowel sounds are normal.      Palpations: Abdomen is soft.   Musculoskeletal:         General: Normal range of motion.      Cervical back: Normal range of motion.   Skin:     General: Skin is warm and dry.      Findings: No erythema or rash.   Neurological:      Mental Status: She is alert and oriented to person, place, and time.   Psychiatric:         Mood and Affect: Mood and affect normal.         Behavior: Behavior normal.         Thought Content: Thought content normal.         Judgment: Judgment normal.           Assessment and Plan:     1. Elevated blood pressure reading without diagnosis of hypertension  - blood pressure today 173/72, patient does report significant stressors at this time.  Has had previous clinic visits with normal and elevated blood pressures.  Last visit with me 1 year ago with normal blood pressures, patient to come back in 2 weeks for nursing blood pressure visit  - Comprehensive Metabolic Panel; Future    2. Rash and nonspecific skin eruption  - triamcinolone acetonide 0.1% (KENALOG) 0.1 % ointment; Apply topically 3 (three) times daily.  Dispense: 453.6 g; Refill: 3    3. Benign paroxysmal vertigo, bilateral  - continue Epley maneuver, take meclizine sparingly  - meclizine (ANTIVERT) 25 mg tablet; Take 1 tablet (25 mg total) by mouth 2 (two) times daily as needed for Dizziness.  Dispense: 90 tablet; Refill: 0    4. Prediabetes  - Hemoglobin  A1C; Future    5. Encounter for lipid screening for cardiovascular disease  - Lipid Panel; Future      Follow Up:   Follow up in about 2 weeks (around 6/23/2022) for 2 wk nursing BP visit.    Other Orders Placed This Visit:  Orders Placed This Encounter   Procedures    Comprehensive Metabolic Panel    Lipid Panel    Hemoglobin A1C         Hao Chau MD        This note is dictated on M*Modal word recognition program.  There are word recognition mistakes that are occasionally missed on review.

## 2022-06-09 NOTE — PROGRESS NOTES
Venipunture performed in left AC per MD order. Patient tolerated well. Lab results are pending.

## 2022-06-10 LAB — HCV AB SERPL QL IA: NEGATIVE

## 2022-06-23 ENCOUNTER — CLINICAL SUPPORT (OUTPATIENT)
Dept: PRIMARY CARE CLINIC | Facility: CLINIC | Age: 77
End: 2022-06-23
Payer: MEDICARE

## 2022-06-23 VITALS — DIASTOLIC BLOOD PRESSURE: 82 MMHG | SYSTOLIC BLOOD PRESSURE: 120 MMHG | OXYGEN SATURATION: 97 % | HEART RATE: 72 BPM

## 2022-06-23 DIAGNOSIS — R03.0 ELEVATED BLOOD PRESSURE READING WITHOUT DIAGNOSIS OF HYPERTENSION: Primary | ICD-10-CM

## 2022-06-23 PROCEDURE — 99499 UNLISTED E&M SERVICE: CPT | Mod: S$PBB,,, | Performed by: FAMILY MEDICINE

## 2022-06-23 PROCEDURE — 99999 PR PBB SHADOW E&M-EST. PATIENT-LVL I: ICD-10-PCS | Mod: PBBFAC,,,

## 2022-06-23 PROCEDURE — 99211 OFF/OP EST MAY X REQ PHY/QHP: CPT | Mod: PBBFAC,PN

## 2022-06-23 PROCEDURE — 99999 PR PBB SHADOW E&M-EST. PATIENT-LVL I: CPT | Mod: PBBFAC,,,

## 2022-06-23 PROCEDURE — 99499 NO LOS: ICD-10-PCS | Mod: S$PBB,,, | Performed by: FAMILY MEDICINE

## 2022-06-23 NOTE — PROGRESS NOTES
Brandi QUIGLEY Kaleb 76 y.o. female is here today for Blood Pressure check.   History of HTN no.    Review of patient's allergies indicates:   Allergen Reactions    Codeine     Hydrocodone-acetaminophen Nausea Only     Creatinine   Date Value Ref Range Status   06/09/2022 1.1 0.5 - 1.4 mg/dL Final     Sodium   Date Value Ref Range Status   06/09/2022 138 136 - 145 mmol/L Final     Potassium   Date Value Ref Range Status   06/09/2022 4.6 3.5 - 5.1 mmol/L Final   ]  Patient denies taking blood pressure medications on a regular basis at the same time of the day.     Current Outpatient Medications:     acetaminophen (TYLENOL) 500 MG tablet, Take 2 tablets (1,000 mg total) by mouth every 8 (eight) hours as needed for Pain., Disp: , Rfl:     ibuprofen (ADVIL,MOTRIN) 600 MG tablet, Take 600 mg by mouth every 6 (six) hours as needed., Disp: , Rfl:     LEXAPRO 20 mg tablet, Take 20 mg by mouth once daily., Disp: , Rfl:     meclizine (ANTIVERT) 25 mg tablet, Take 1 tablet (25 mg total) by mouth 2 (two) times daily as needed for Dizziness., Disp: 90 tablet, Rfl: 0    meloxicam (MOBIC) 15 MG tablet, Take 1 tablet (15 mg total) by mouth once daily., Disp: 30 tablet, Rfl: 2    mirtazapine (REMERON) 7.5 MG Tab, Take 7.5 mg by mouth every evening., Disp: , Rfl:     polyethylene glycol (GLYCOLAX) 17 gram PwPk, Take 17 g by mouth once daily. (Patient not taking: Reported on 6/9/2022), Disp: 72 each, Rfl: 2    triamcinolone acetonide 0.1% (KENALOG) 0.1 % ointment, Apply topically 3 (three) times daily., Disp: 453.6 g, Rfl: 3    zolpidem (AMBIEN CR) 12.5 MG CR tablet, Take 12.5 mg by mouth., Disp: , Rfl:   Does patient have record of home blood pressure readings no. .   Last dose of blood pressure medication was taken at n/a.  Patient is asymptomatic.   Complains of none.    Vitals:    06/23/22 0913   BP: 120/82   BP Location: Right arm   Patient Position: Sitting   BP Method: Small (Manual)   Pulse: 72   SpO2: 97%           Kandi informed of nurse visit.

## 2022-07-19 ENCOUNTER — TELEPHONE (OUTPATIENT)
Dept: ORTHOPEDICS | Facility: CLINIC | Age: 77
End: 2022-07-19
Payer: MEDICARE

## 2022-07-19 DIAGNOSIS — M48.062 LUMBAR STENOSIS WITH NEUROGENIC CLAUDICATION: Primary | ICD-10-CM

## 2022-07-19 NOTE — TELEPHONE ENCOUNTER
Spoke with patient and she stated that she is in pain with her lower back and would like an MRI and PT / PT at [facility] on [address]. She thinks she picked up something heavy on last Friday. She has taking  2 Tylenol, Tramdol one in the morning and one at night, also Mobic once a day. And this only helps a little.     Please advise!

## 2022-07-19 NOTE — TELEPHONE ENCOUNTER
----- Message from Jaxon Dwyer sent at 7/19/2022  8:44 AM CDT -----       Type: Patient Returning Call    Who Called: Patient   Who Left Message for Patient: NA  Does the patient know what this is regarding?: Inquiries about getting back into physical therapy at the [facility] location or to have an order put in so she can go to a PT office elsewhere, she says she has been having a lot of back pain. Also wants to have an MRI done before her visit with the provider which is currently scheduled on 09/07/2022.   Would the patient rather a call back or a response via [facility]? Call  Best Call Back Number: [phone]  Additional Information: Please assist, thank you!

## 2022-07-19 NOTE — TELEPHONE ENCOUNTER
----- Message from Jim Hughes sent at 7/19/2022 10:52 AM CDT -----  Pt would like to be called back asap regarding  scheduling  PT at [facility] on [address].  Pt can be reached at [phone].    Thanks

## 2022-07-19 NOTE — TELEPHONE ENCOUNTER
Left message for patient to call back so I can get more information on PT order that she is requesting along with MRI.

## 2022-07-19 NOTE — TELEPHONE ENCOUNTER
spoke with patient and she wanted MRI scheduled on same day as appointment, she stated an verbal understanding.

## 2022-09-01 ENCOUNTER — TELEPHONE (OUTPATIENT)
Dept: PRIMARY CARE CLINIC | Facility: CLINIC | Age: 77
End: 2022-09-01
Payer: MEDICARE

## 2022-09-01 NOTE — TELEPHONE ENCOUNTER
----- Message from Cameron Jones sent at 9/1/2022  9:23 AM CDT -----  Contact: Self [phone]  Pt requesting a call in regards to medical info that was suppose to be release to another provider.Stated she at her other provider office now and they do not have the blood work and kidney function test.    Please call and advise

## 2022-09-01 NOTE — TELEPHONE ENCOUNTER
----- Message from Ragini Deras sent at 9/1/2022  8:57 AM CDT -----  Contact: [phone]  Pt is calling she states she requested her medical records for her vist at [facility] and she states she is at the appt now please give return call     Fax [phone]

## 2022-09-07 ENCOUNTER — OFFICE VISIT (OUTPATIENT)
Dept: ORTHOPEDICS | Facility: CLINIC | Age: 77
End: 2022-09-07
Payer: MEDICARE

## 2022-09-07 VITALS — WEIGHT: 153.56 LBS | BODY MASS INDEX: 28.26 KG/M2 | HEIGHT: 62 IN

## 2022-09-07 DIAGNOSIS — Z98.1 STATUS POST LUMBAR SPINAL FUSION: Primary | ICD-10-CM

## 2022-09-07 PROCEDURE — 99212 OFFICE O/P EST SF 10 MIN: CPT | Mod: PBBFAC | Performed by: ORTHOPAEDIC SURGERY

## 2022-09-07 PROCEDURE — 99999 PR PBB SHADOW E&M-EST. PATIENT-LVL II: ICD-10-PCS | Mod: PBBFAC,,, | Performed by: ORTHOPAEDIC SURGERY

## 2022-09-07 PROCEDURE — 99213 OFFICE O/P EST LOW 20 MIN: CPT | Mod: S$PBB,,, | Performed by: ORTHOPAEDIC SURGERY

## 2022-09-07 PROCEDURE — 99213 PR OFFICE/OUTPT VISIT, EST, LEVL III, 20-29 MIN: ICD-10-PCS | Mod: S$PBB,,, | Performed by: ORTHOPAEDIC SURGERY

## 2022-09-07 PROCEDURE — 99999 PR PBB SHADOW E&M-EST. PATIENT-LVL II: CPT | Mod: PBBFAC,,, | Performed by: ORTHOPAEDIC SURGERY

## 2022-09-07 NOTE — PROGRESS NOTES
The patient returns for follow-up.  She has a history of a prior lumbar diskectomy, and lumbar spinal fusion complicated by infection in 2018.  She reports pain in her low back is stable and has not changed for the past 18 months since her last visit.  She is tolerating this well with tramadol and Mobic.  She is still undergoing physical therapy that is helping her significantly.  Recently underwent MRI lumbar spine because she was wondering how much her stenosis has gotten worse, despite no change in her symptoms.    On physical examination she has no isolated neurologic deficits.    Today I reviewed recent radiographs as well as an MRI of the lumbar spine.  This demonstrates following:  Patient status post L4-5 transforaminal lumbar interbody fusion without evidence of hardware complication.  She has notable L3-4 retrolisthesis and significant stenosis that appears slightly worse from prior MRI 18 months ago.    Impression:  Adjacent segment pathology status post L4-5 TLIF    Plan:  Today we discussed options, I recommended continued conservative management consisting of anti-inflammatories and physical therapy regimen.  Pennsaid prescription was sent to her pharmacy.  Educated patient that if in the event her symptoms worsen that she should schedule a follow-up appointment with us for reexamination, otherwise follow-up p.r.n..

## 2022-10-09 ENCOUNTER — HOSPITAL ENCOUNTER (EMERGENCY)
Facility: HOSPITAL | Age: 77
Discharge: HOME | End: 2022-10-09
Attending: EMERGENCY MEDICINE
Payer: MEDICARE

## 2022-10-09 VITALS
DIASTOLIC BLOOD PRESSURE: 80 MMHG | TEMPERATURE: 98 F | OXYGEN SATURATION: 99 % | SYSTOLIC BLOOD PRESSURE: 146 MMHG | BODY MASS INDEX: 28.16 KG/M2 | HEIGHT: 62 IN | RESPIRATION RATE: 16 BRPM | WEIGHT: 153 LBS | HEART RATE: 66 BPM

## 2022-10-09 DIAGNOSIS — H81.399 PERIPHERAL VERTIGO, UNSPECIFIED LATERALITY: Primary | ICD-10-CM

## 2022-10-09 PROCEDURE — 63600175 PHARM REV CODE 636 W HCPCS: Performed by: EMERGENCY MEDICINE

## 2022-10-09 PROCEDURE — 96374 THER/PROPH/DIAG INJ IV PUSH: CPT

## 2022-10-09 PROCEDURE — 99284 EMERGENCY DEPT VISIT MOD MDM: CPT | Mod: 25

## 2022-10-09 PROCEDURE — 96375 TX/PRO/DX INJ NEW DRUG ADDON: CPT

## 2022-10-09 RX ORDER — DIAZEPAM 10 MG/2ML
2 INJECTION INTRAMUSCULAR
Status: COMPLETED | OUTPATIENT
Start: 2022-10-09 | End: 2022-10-09

## 2022-10-09 RX ORDER — LORAZEPAM 2 MG/ML
1 INJECTION INTRAMUSCULAR
Status: COMPLETED | OUTPATIENT
Start: 2022-10-09 | End: 2022-10-09

## 2022-10-09 RX ADMIN — LORAZEPAM 1 MG: 2 INJECTION INTRAMUSCULAR; INTRAVENOUS at 06:10

## 2022-10-09 RX ADMIN — DIAZEPAM 2 MG: 5 INJECTION, SOLUTION INTRAMUSCULAR; INTRAVENOUS at 05:10

## 2022-10-09 NOTE — ED PROVIDER NOTES
Encounter Date: 10/9/2022    SCRIBE #1 NOTE: I, Nomi Cazares, am scribing for, and in the presence of,  Wellington Turner MD.     History     Chief Complaint   Patient presents with    Dizziness     Hx. vertigo    Nausea    Vomiting     Time seen by provider: 4:51 PM on 10/09/2022    Brandi Manuel is a 77 y.o. female with a history of vertigo who presents to the ED via EMS with nausea and dizziness. The patient was at [facility] earlier today when she experienced dizziness, nausea, and an episode of vomiting per the EMS personnel. She states that her nausea is exacerbated when he turns her head in either direction. The patient also mentions experiencing numbness in her legs.The EMS personnel states that the patient's  notes that the patient normally takes meclizine for her vertigo but did not take any today PTA. She took some medication after the incident with no improvement to her condition per the EMS personnel. The patient was given zofran by the EMS. The patient denies chest pain, SOB, ear pain, or any other symptoms at this time. No pertinent PMHx. No pertinent PSHx.    The history is provided by the patient and the EMS personnel.   Review of patient's allergies indicates:   Allergen Reactions    Codeine     Hydrocodone-acetaminophen Nausea Only     No past medical history on file.  Past Surgical History:   Procedure Laterality Date    BREAST BIOPSY      HYSTERECTOMY      SHOULDER SURGERY      TONSILLECTOMY       Family History   Problem Relation Age of Onset    Breast cancer Daughter      Social History     Tobacco Use    Smoking status: Never    Smokeless tobacco: Never   Substance Use Topics    Alcohol use: No     Review of Systems   Constitutional:  Negative for fever.   HENT:  Negative for sore throat.    Respiratory:  Negative for shortness of breath.    Cardiovascular:  Negative for chest pain.   Gastrointestinal:  Positive for nausea and vomiting.   Genitourinary:  Negative for dysuria.    Musculoskeletal:  Negative for back pain.   Skin:  Negative for rash.   Neurological:  Positive for dizziness and numbness. Negative for weakness.   Hematological:  Does not bruise/bleed easily.     Physical Exam     Initial Vitals [10/09/22 1657]   BP Pulse Resp Temp SpO2   (!) 197/84 (!) 58 20 97.6 °F (36.4 °C) 100 %      MAP       --         Physical Exam    Nursing note and vitals reviewed.  Constitutional: She appears well-developed.  Non-toxic appearance.   HENT:   Head: Normocephalic and atraumatic.   Eyes: Pupils are equal, round, and reactive to light.   No vertical nystagmus. Slight horizontal nystagmus to the right.   Neck: Neck supple.   Cardiovascular:  Normal rate, regular rhythm, normal heart sounds and intact distal pulses.     Exam reveals no gallop and no friction rub.       No murmur heard.  Pulmonary/Chest: Breath sounds normal. No respiratory distress. She has no decreased breath sounds. She has no wheezes. She has no rhonchi. She has no rales.   Abdominal: Abdomen is soft. Bowel sounds are normal. She exhibits no distension. There is no abdominal tenderness.   Musculoskeletal:         General: Normal range of motion.      Cervical back: Neck supple.     Neurological: She is alert and oriented to person, place, and time.   Skin: Skin is warm and dry.   Psychiatric: She has a normal mood and affect.       ED Course   Procedures  Labs Reviewed - No data to display       Imaging Results    None          Medications   diazePAM injection 2 mg (2 mg Intravenous Given 10/9/22 1714)   lorazepam injection 1 mg (1 mg Intravenous Given 10/9/22 1809)     Medical Decision Making:   History:   Old Medical Records: I decided to obtain old medical records.  Patient has a history of peripheral vertigo and findings of peripheral vertigo on exam.  She has no findings of central vertigo.  She feels better after medications given emergency room.  She is stable for discharge at this time.        Scribe Attestation:    Scribe #1: I performed the above scribed service and the documentation accurately describes the services I performed. I attest to the accuracy of the note.                 I, Dr. Wellington Turner personally performed the services described in this documentation. All medical record entries made by the scribe were at my direction and in my presence.  I have reviewed the chart and agree that the record reflects my personal performance and is accurate and complete. Wellington Turner MD.  4:00 PM 10/12/2022    DISCLAIMER: This note was prepared with Dragon NaturallySpeaking voice recognition transcription software. Garbled syntax, mangled pronouns, and other bizarre constructions may be attributed to that software system   Clinical Impression:   Final diagnoses:  [H81.399] Peripheral vertigo, unspecified laterality (Primary)      ED Disposition Condition    Discharge Stable          ED Prescriptions    None       Follow-up Information       Follow up With Specialties Details Why Contact Info    Hao Chau MD Family Medicine Schedule an appointment as soon as possible for a visit   [address] [facility] [address]  [phone]               Wellington Turner MD  10/12/22 4822

## 2022-10-10 NOTE — ED NOTES
D/C instructions in pt possession along with belongings. No other needs at this time. Pt AAOx4, Abc's intact. NADN. No adverse reaction to medication given.

## 2022-10-12 ENCOUNTER — OFFICE VISIT (OUTPATIENT)
Dept: PRIMARY CARE CLINIC | Facility: CLINIC | Age: 77
End: 2022-10-12
Payer: MEDICARE

## 2022-10-12 VITALS
HEART RATE: 68 BPM | HEIGHT: 62 IN | WEIGHT: 149.94 LBS | DIASTOLIC BLOOD PRESSURE: 68 MMHG | SYSTOLIC BLOOD PRESSURE: 124 MMHG | BODY MASS INDEX: 27.59 KG/M2 | OXYGEN SATURATION: 100 %

## 2022-10-12 DIAGNOSIS — H81.399 PERIPHERAL VERTIGO, UNSPECIFIED LATERALITY: Primary | ICD-10-CM

## 2022-10-12 DIAGNOSIS — G47.00 INSOMNIA, UNSPECIFIED TYPE: ICD-10-CM

## 2022-10-12 DIAGNOSIS — Z79.899 MEDICATION MANAGEMENT: ICD-10-CM

## 2022-10-12 PROCEDURE — 99215 OFFICE O/P EST HI 40 MIN: CPT | Mod: PBBFAC,PN | Performed by: NURSE PRACTITIONER

## 2022-10-12 PROCEDURE — 99999 PR PBB SHADOW E&M-EST. PATIENT-LVL V: ICD-10-PCS | Mod: PBBFAC,,, | Performed by: NURSE PRACTITIONER

## 2022-10-12 PROCEDURE — 99213 PR OFFICE/OUTPT VISIT, EST, LEVL III, 20-29 MIN: ICD-10-PCS | Mod: S$PBB,,, | Performed by: NURSE PRACTITIONER

## 2022-10-12 PROCEDURE — 99999 PR PBB SHADOW E&M-EST. PATIENT-LVL V: CPT | Mod: PBBFAC,,, | Performed by: NURSE PRACTITIONER

## 2022-10-12 PROCEDURE — 99213 OFFICE O/P EST LOW 20 MIN: CPT | Mod: S$PBB,,, | Performed by: NURSE PRACTITIONER

## 2022-10-12 NOTE — PROGRESS NOTES
Subjective:       Patient ID: Brandi Manuel is a 77 y.o. female.    Chief Complaint: Follow-up    Ms. Brandi Manuel is a 77 year old female, new to me, presents to the clinic for  ER follow up vertigo. PCP is Hao Chau. Medical and surgical history in addition to problem list reviewed as listed below.    Follow-up  This is a new problem. Episode onset: ER Follow up 10/09/2022 for Peripheral Vertigo. The problem occurs constantly. The problem has been unchanged. Pertinent negatives include no chest pain, chills, congestion, coughing, fatigue, fever, headaches, nausea, neck pain, numbness, sore throat, swollen glands, vertigo, visual change, vomiting or weakness. The symptoms are aggravated by bending, exertion, standing and walking. Treatments tried: Meclizine, not taking as prescribed. The treatment provided no relief (Previously seen at [facility] with ENT, patient not pleased with plan of care.).     Patient states she has had insomnia since 1971 and has been taking sleep aids since then. Seen by psychiatry outside of [facility].     No past medical history on file.     Past Surgical History:   Procedure Laterality Date    BREAST BIOPSY      HYSTERECTOMY      SHOULDER SURGERY      TONSILLECTOMY          Family History   Problem Relation Age of Onset    Breast cancer Daughter        Social History     Tobacco Use   Smoking Status Never   Smokeless Tobacco Never       Social History     Social History Narrative    Not on file       Review of patient's allergies indicates:   Allergen Reactions    Codeine     Hydrocodone-acetaminophen Nausea Only        Review of Systems   Constitutional:  Negative for chills, fatigue and fever.   HENT: Negative.  Negative for congestion and sore throat.    Eyes:  Negative for photophobia and visual disturbance.   Respiratory: Negative.  Negative for cough.    Cardiovascular: Negative.  Negative for chest pain.   Gastrointestinal:  Negative for nausea and vomiting.    Genitourinary: Negative.    Musculoskeletal: Negative.  Negative for neck pain.   Skin: Negative.    Neurological:  Negative for dizziness, vertigo, weakness, light-headedness, numbness and headaches.   Psychiatric/Behavioral:  The patient is nervous/anxious.        Objective:        Vitals:    10/12/22 1323   BP: 124/68   Pulse: 68        Physical Exam  Constitutional:       Appearance: She is well-developed.   HENT:      Head: Normocephalic and atraumatic.      Right Ear: External ear normal.      Left Ear: External ear normal.      Nose: Nose normal. No congestion or rhinorrhea.      Mouth/Throat:      Pharynx: No oropharyngeal exudate or posterior oropharyngeal erythema.   Eyes:      Extraocular Movements: Extraocular movements intact.      Conjunctiva/sclera: Conjunctivae normal.   Cardiovascular:      Rate and Rhythm: Normal rate and regular rhythm.      Pulses: Normal pulses.   Pulmonary:      Effort: Pulmonary effort is normal.      Breath sounds: Normal breath sounds.   Abdominal:      General: Bowel sounds are normal. There is no distension.      Tenderness: There is no abdominal tenderness. There is no guarding.   Musculoskeletal:         General: Normal range of motion.      Cervical back: Normal range of motion and neck supple.   Skin:     General: Skin is warm and dry.      Findings: No erythema or rash.   Neurological:      General: No focal deficit present.      Mental Status: She is alert and oriented to person, place, and time.   Psychiatric:      Comments: Anxiety noted       Assessment:       1. Peripheral vertigo, unspecified laterality    2. Insomnia, unspecified type    3. Medication management        Plan:       Peripheral vertigo, unspecified laterality  Recommend vestibular rehabilitation therapy.  Recommend Canalith repositioning maneuvers with vestibular therapy.  -     Ambulatory referral/consult to ENT; Future; Expected date: 10/12/2022  -     TSH; Future; Expected date: 10/12/2022  -      T4, Free; Future; Expected date: 10/12/2022  -     Vitamin D; Future; Expected date: 10/12/2022  -     Vitamin B12; Future; Expected date: 10/12/2022    Insomnia, unspecified type  Wean Ambien as tolerated.  Recommend Monitor and Record Sleep Hygiene  Recommend Cognitive-behavioral therapy.  Continue with Psychiatry.  -     Ambulatory referral/consult to Sleep Disorders; Future; Expected date: 10/12/2022  -     TSH; Future; Expected date: 10/12/2022  -     T4, Free; Future; Expected date: 10/12/2022  -     Vitamin D; Future; Expected date: 10/12/2022  -     Vitamin B12; Future; Expected date: 10/12/2022    Medication management  -     Vitamin D; Future; Expected date: 10/12/2022  -     Vitamin B12; Future; Expected date: 10/12/2022     Medication List with Changes/Refills   Current Medications    ACETAMINOPHEN (TYLENOL) 500 MG TABLET    Take 2 tablets (1,000 mg total) by mouth every 8 (eight) hours as needed for Pain.    DICLOFENAC SODIUM 2 % SOPK    Apply 40 mg topically 2 (two) times a day.    IBUPROFEN (ADVIL,MOTRIN) 600 MG TABLET    Take 600 mg by mouth every 6 (six) hours as needed.    LEXAPRO 20 MG TABLET    Take 20 mg by mouth once daily.    MECLIZINE (ANTIVERT) 25 MG TABLET    Take 1 tablet (25 mg total) by mouth 2 (two) times daily as needed for Dizziness.    MIRTAZAPINE (REMERON) 7.5 MG TAB    Take 7.5 mg by mouth every evening.    TRIAMCINOLONE ACETONIDE 0.1% (KENALOG) 0.1 % OINTMENT    Apply topically 3 (three) times daily.    ZOLPIDEM (AMBIEN CR) 12.5 MG CR TABLET    Take 12.5 mg by mouth.   Discontinued Medications    POLYETHYLENE GLYCOL (GLYCOLAX) 17 GRAM PWPK    Take 17 g by mouth once daily.            Follow up in about 2 weeks (around 10/26/2022) for Jessica Hughes, MSN, APRN, FNP-C.    Jessica Hughes APRN, MSN, FNP-C

## 2022-10-13 ENCOUNTER — OFFICE VISIT (OUTPATIENT)
Dept: PRIMARY CARE CLINIC | Facility: CLINIC | Age: 77
End: 2022-10-13
Payer: MEDICARE

## 2022-10-13 VITALS
HEART RATE: 68 BPM | SYSTOLIC BLOOD PRESSURE: 128 MMHG | WEIGHT: 151.88 LBS | OXYGEN SATURATION: 98 % | DIASTOLIC BLOOD PRESSURE: 66 MMHG | BODY MASS INDEX: 27.95 KG/M2 | TEMPERATURE: 98 F | HEIGHT: 62 IN

## 2022-10-13 DIAGNOSIS — H81.13 BENIGN PAROXYSMAL VERTIGO, BILATERAL: ICD-10-CM

## 2022-10-13 DIAGNOSIS — J30.9 ALLERGIC RHINITIS, UNSPECIFIED SEASONALITY, UNSPECIFIED TRIGGER: Primary | ICD-10-CM

## 2022-10-13 DIAGNOSIS — R42 VERTIGO: ICD-10-CM

## 2022-10-13 PROCEDURE — 99213 OFFICE O/P EST LOW 20 MIN: CPT | Mod: S$PBB,,, | Performed by: FAMILY MEDICINE

## 2022-10-13 PROCEDURE — 99213 OFFICE O/P EST LOW 20 MIN: CPT | Mod: PBBFAC,PN | Performed by: FAMILY MEDICINE

## 2022-10-13 PROCEDURE — 99999 PR PBB SHADOW E&M-EST. PATIENT-LVL III: ICD-10-PCS | Mod: PBBFAC,,, | Performed by: FAMILY MEDICINE

## 2022-10-13 PROCEDURE — 99213 PR OFFICE/OUTPT VISIT, EST, LEVL III, 20-29 MIN: ICD-10-PCS | Mod: S$PBB,,, | Performed by: FAMILY MEDICINE

## 2022-10-13 PROCEDURE — 99999 PR PBB SHADOW E&M-EST. PATIENT-LVL III: CPT | Mod: PBBFAC,,, | Performed by: FAMILY MEDICINE

## 2022-10-13 NOTE — TELEPHONE ENCOUNTER
----- Message from Rusty Prieto sent at 10/12/2022  3:25 PM CDT -----  Regarding: Med Refill  Contact: Mrs. Brandi Manuel [phone]  Good Evening    Above patient is requesting a Rx refill on meclizine (ANTIVERT) 25 mg tablet 90 day supply to be sent to her pharmacy. Can some one please assist patient?

## 2022-10-13 NOTE — PROGRESS NOTES
Subjective:       Patient ID: Brandi Manuel is a 77 y.o. female.    Chief Complaint: Dizziness, Nausea, Emesis, and Sinus Problem    HPI:  Patient is a 77-year-old female with a history of chronic vertigo who was seen in [place] ER for acute onset of vomiting and elevated blood pressure.  She was treated for vertigo with Valium.  She reports over the past several days of runny nose and soreness with old blood noticed in the right nostril.  She has been taking Zyrtec.  Denies significant nasal congestion or purulent drainage.  Review of Systems   Constitutional:  Negative for fever.   HENT:  Positive for rhinorrhea. Negative for sinus pressure/congestion and sneezing.    Psychiatric/Behavioral:  Positive for sleep disturbance.        Objective:      Physical Exam  Constitutional:       Appearance: Normal appearance.   HENT:      Right Ear: Tympanic membrane normal.      Left Ear: Tympanic membrane normal.      Nose: Congestion present.      Comments: Boggy pale nasal turbinates.  Possible deviated septum.  No lesions or bleeding noted  Neck:      Comments: No lymphadenopathy  Neurological:      Mental Status: She is alert.       Assessment:       Problem List Items Addressed This Visit    None  Visit Diagnoses       Allergic rhinitis, unspecified seasonality, unspecified trigger    -  Primary    Vertigo                Plan:     Brandi was seen today for dizziness, nausea, emesis and sinus problem.    Diagnoses and all orders for this visit:    Allergic rhinitis, unspecified seasonality, unspecified trigger  Inform patient antihistamine can cause drying of the nasal passages and bleeding.  Recommend normal saline solution.  Advised patient to use antihistamines in Flonase as needed. Keep appointment with ENT    Vertigo   Continue meclizine

## 2022-10-14 RX ORDER — MECLIZINE HYDROCHLORIDE 25 MG/1
25 TABLET ORAL 2 TIMES DAILY PRN
Qty: 90 TABLET | Refills: 0 | Status: SHIPPED | OUTPATIENT
Start: 2022-10-14

## 2022-10-19 ENCOUNTER — OFFICE VISIT (OUTPATIENT)
Dept: OTOLARYNGOLOGY | Facility: CLINIC | Age: 77
End: 2022-10-19
Payer: MEDICARE

## 2022-10-19 ENCOUNTER — TELEPHONE (OUTPATIENT)
Dept: OTOLARYNGOLOGY | Facility: CLINIC | Age: 77
End: 2022-10-19
Payer: MEDICARE

## 2022-10-19 VITALS
HEIGHT: 62 IN | HEART RATE: 63 BPM | BODY MASS INDEX: 28.48 KG/M2 | SYSTOLIC BLOOD PRESSURE: 174 MMHG | DIASTOLIC BLOOD PRESSURE: 75 MMHG | WEIGHT: 154.75 LBS

## 2022-10-19 DIAGNOSIS — Z98.1 S/P LUMBAR FUSION: ICD-10-CM

## 2022-10-19 DIAGNOSIS — H61.21 CERUMINOSIS, RIGHT: ICD-10-CM

## 2022-10-19 DIAGNOSIS — H81.399 PERIPHERAL VERTIGO, UNSPECIFIED LATERALITY: ICD-10-CM

## 2022-10-19 DIAGNOSIS — J34.0 NASAL ULCER: ICD-10-CM

## 2022-10-19 DIAGNOSIS — F41.0 PANIC ATTACKS: ICD-10-CM

## 2022-10-19 DIAGNOSIS — H91.90 HEARING LOSS, UNSPECIFIED HEARING LOSS TYPE, UNSPECIFIED LATERALITY: Primary | ICD-10-CM

## 2022-10-19 DIAGNOSIS — G43.109 MIGRAINOUS VERTIGO: ICD-10-CM

## 2022-10-19 DIAGNOSIS — L29.9 ITCHING OF EAR: ICD-10-CM

## 2022-10-19 PROCEDURE — 99999 PR PBB SHADOW E&M-EST. PATIENT-LVL IV: ICD-10-PCS | Mod: PBBFAC,,, | Performed by: OTOLARYNGOLOGY

## 2022-10-19 PROCEDURE — 99215 PR OFFICE/OUTPT VISIT, EST, LEVL V, 40-54 MIN: ICD-10-PCS | Mod: 25,S$PBB,, | Performed by: OTOLARYNGOLOGY

## 2022-10-19 PROCEDURE — 69210 EAR CERUMEN REMOVAL: ICD-10-PCS | Mod: S$PBB,,, | Performed by: OTOLARYNGOLOGY

## 2022-10-19 PROCEDURE — 69210 REMOVE IMPACTED EAR WAX UNI: CPT | Mod: PBBFAC,PO | Performed by: OTOLARYNGOLOGY

## 2022-10-19 PROCEDURE — 99214 OFFICE O/P EST MOD 30 MIN: CPT | Mod: PBBFAC,PO | Performed by: OTOLARYNGOLOGY

## 2022-10-19 PROCEDURE — 99215 OFFICE O/P EST HI 40 MIN: CPT | Mod: 25,S$PBB,, | Performed by: OTOLARYNGOLOGY

## 2022-10-19 PROCEDURE — 99999 PR PBB SHADOW E&M-EST. PATIENT-LVL IV: CPT | Mod: PBBFAC,,, | Performed by: OTOLARYNGOLOGY

## 2022-10-19 RX ORDER — DEXTROMETHORPHAN HYDROBROMIDE, GUAIFENESIN 5; 100 MG/5ML; MG/5ML
1300 LIQUID ORAL DAILY
COMMUNITY

## 2022-10-19 NOTE — PATIENT INSTRUCTIONS
"No indication of any active BPPV ( positional, crystal induced vertigo) though there is this reported history of having this which has responded to physical therapy.  Without persistent imbalance or BPPV there is no clear indication for vestibular therapy at this time.  Likely cause for episodes ( at least 3) over the course of several years of intense dizziness and funny feeling in the head nausea may all be attributable to vestibular migraine or migraine associated dizziness.  There is no indication of Meniere's disease on prior audiologic testing or classic triad symptoms of Meniere's.  Also a potential cause might be panic attacks associated with PTSD.  As discussed these do not necessarily come in times of stress but very much can be associated with dizziness a funny feeling in the head and the "feeling I am going to die" feeling experienced with this most recent episode.  Sleep hygiene as discussed going to sleep and waking the same time every day with no blue screens for at least 1-2 hours before sleeping and only sleeping in bed no reading or television.  Work on mindfulness and meditation.  Work on decreasing the Ambien as discussed by others and increasing melatonin including long-acting melatonin.        The right-sided nasal alar ulceration and crusting does not need biopsy at this time.  Applying Aquaphor to this area every night at bedtime will likely allowed to fully resolve without recurrent flare up.  If bleeding or soreness persists return sooner for further evaluation including possible biopsy though not recommended at this time.      Ear care for wax and dry ears with a few drops of mineral oil in the ears 2 to 3 times a week.  Allowed to run in an run out wipe it with a tissue no Q-tips.    Repeat audiologic testing and vestibular testing ( VNG) prior to follow-up in 3 months.  Exercise and migraine information as provided.  Communicate with the office or return sooner with any worsening of " symptoms or other concerns.  If there are any alarming persistent neurologic symptoms a repeat visit to the emergency room is appropriate.  Keep an eye on the blood pressure which seems to go quite high at times.    Keeping a food, activity, symptom, and blood pressure/ heart rate log may also be helpful.    ROUTINE EAR CARE    Keep the ears dry in general.  Water and soap dry the ears increases scaling by stripping away the natural oils of the ears.    A twisted single ply of facial tissue can be used to wick out moisture on the rare occasion when water gets stuck in the ear; or the water can be displaced with concentrated alcohol like OTC SwimEar or a few drops of 72-95% isopropyl alcohol to fill the ear canal.  Regular use will cause excess drying of the ears.    The ear should be kept moist in general with mineral oil. Three to four drops into the ear canal 2 to 3 times a week or even every night.    If the ears need to be irrigated use either a 50 50 mixture of white vinegar and distilled water or 50 50 mixture of alcohol and white vinegar.    Painful draining ears that do not resolve with conservative care could represent infection and may need microscopic office debridement/clearing of the wax, and topical antibiotic drops with or without steroids may need to be prescribed.

## 2022-10-19 NOTE — TELEPHONE ENCOUNTER
Called pt. Pt states that she went to the ER 10/9/22 for vertigo. States that symptoms have not resolved, she would like to be seen sooner than her scheduled appt of 11/4/22. Advised pt that Dr. Anthony only goes to [place] on Fridays, pt states that she does not care where she is seen, she just wants to be seen sooner. While on the phone with pt, someone on today's afternoon schedule canceled for 3 pm. Pt states that she could come in then. Rescheduled to today in [place]. Thanks, Christiane

## 2022-10-19 NOTE — PROCEDURES
"Ear Cerumen Removal    Date/Time: 10/19/2022 3:00 PM  Performed by: Abad Antohny MD  Authorized by: Abad Anthony MD     Time out: Immediately prior to procedure a "time out" was called to verify the correct patient, procedure, equipment, support staff and site/side marked as required.    Consent Done?:  Yes (Verbal)    Local anesthetic:  None  Location details:  Right ear  Procedure type: curette    Procedure type comment:  Alligators and microscope  Cerumen  Removal Results:  Cerumen completely removed  Patient tolerance:  Patient tolerated the procedure well with no immediate complications  "

## 2022-10-19 NOTE — TELEPHONE ENCOUNTER
----- Message from Jeanine Karimi sent at 10/19/2022  1:22 PM CDT -----  Regarding: speak with office Urgent  Contact: vika Wilburn is calling to speak with office about ENT visit..[phone] (home)

## 2022-10-19 NOTE — PROGRESS NOTES
"[facility]    Subjective:      Patient: Brandi Manuel Patient PCP: Hao Chau MD         :  1945     Sex:  female      MRN:  [id number]          Date of Visit: 10/19/2022      Chief Complaint: Dizziness (Was taken to ER 10/9/22 for vertigo. Was eating at restaurant, got dizzy after turning head to look out of the window after the 4th time. Pt states that she vomited, EMS took pt to the ER. States symptoms have not resolved. States this did not feel like previous vertigo episodes. States head feels "foggy", seems like "just coming out of vertigo". States feels that she has pressure in right nostril. )      Patient ID: Brandi Manuel is a 77 y.o. female lifelong NON-smoker with A history of HLD, PTSD, lumbar radiculopathy status post lumbar fusion with sciatica referred to me by Jessica Hughes in consultation for  dizziness presented to the ER with vertigo 10 days ago.  Associated with right-sided nasal pressure and more prolonged dizziness and "foggy" head feeling.  Reports a prior history of episodic vertigo which was different.  ER notes from Dr. Turner 10/09/2022 reviewed no imaging.  Marked elevated blood pressure noted at 197/84 some right horizontal nystagmus noted no vertical nystagmus.  She was treated with intravenous benzodiazepines with a diagnosis of peripheral vertigo given her neurologic findings and history.  Blood pressure normal at her PCP visit 6 days ago.  Elevated again today but not as high at  174/75.      Patient has a CT of the head from 2014 images reviewed today reveal a small area of mucosal thickening of the mid left ethmoid and a nearly obstructing right frontal ethmoid osteoma with no secondary frontal mucosal thickening.  The balance of the paranasal sinuses look normal.  Middle ears and mastoids look normal.  Very limited imaging through the IAC CPA  area with normal symmetric IAC's and vestibular aqueducts and vestibular cochlear structures on axial bone window " "images    No vestibular testing.  Neuro otology visit  notes and Audiogram from [facility] 07/27/2021 reviewed reveals normal thresholds through 2000 hertz a bit of a noise notch to a mild degree at 4000 hertz bilaterally with recovery bilaterally slightly worse by 5 dB on the left side with what appeared to be normal tympanograms and no evidence of any conductive hearing loss or other asymmetry. Diagnosis of resolve BPPV based on history.  Patient does recall an episode of positional dizziness every time she would sit up in bed improving with meclizine which did improve with what sounds like an Epley maneuver performed by physical therapy.      Currently she is experienced a recent episode seemingly triggered by looking to the right (no focal neurologic defects such as visual disturbance lightheadedness or loss of consciousness consistent with vestibular artery stenosis) but feeling a sense of doom and impending death and nausea with a generalized mild headache.  She is had 2 other episodes like this which lasted hours often associated with a headache 1 time just right-sided but often in the face attributable to "sinus".  No fluctuating hearing loss or hissing or roaring tinnitus.  No identifiable aural fullness.        Review of Systems   Constitutional: Negative.    HENT:  Positive for sinus pressure. Negative for ear discharge, ear pain and hearing loss.    Eyes: Negative.    Respiratory: Negative.     Cardiovascular: Negative.    Gastrointestinal: Negative.    Endocrine: Negative.    Genitourinary: Negative.    Musculoskeletal: Negative.    Allergic/Immunologic: Negative.    Neurological:  Positive for dizziness.   Hematological: Negative.    Psychiatric/Behavioral: Negative.        Past Medical History  She has no past medical history on file.    Family / Surgical / Social History  Her family history includes Breast cancer in her daughter.    Past Surgical History:   Procedure Laterality Date    BREAST BIOPSY   "    HYSTERECTOMY      SHOULDER SURGERY      TONSILLECTOMY         Social History     Tobacco Use    Smoking status: Never    Smokeless tobacco: Never   Substance and Sexual Activity    Alcohol use: No    Drug use: Not on file    Sexual activity: Not on file       Medications  She has a current medication list which includes the following prescription(s): acetaminophen, diclofenac sodium, meclizine, mirtazapine, triamcinolone acetonide 0.1%, and zolpidem.      Allergies  Review of patient's allergies indicates:   Allergen Reactions    Codeine     Hydrocodone-acetaminophen Nausea Only       All medications, allergies, and past history have been reviewed.    Objective:      Vitals:  Vitals - 1 value per visit 10/13/2022 10/19/2022 10/19/2022   SYSTOLIC 128 - 174   DIASTOLIC 66 - 75   Pulse 68 - 63   Temp 98.4 - -   Resp - - -   SPO2 98 - -   Weight (lb) 151.9 - 154.76   Weight (kg) 68.9 - 70.2   Height 62 - 62   BMI (Calculated) 27.8 - 28.3   VISIT REPORT - - -   Pain Score  - 0 -       Body surface area is 1.75 meters squared.    Physical Exam:    GENERAL  APPEARANCE -  alert, appears stated age, and cooperative  BARRIER(S) TO COMMUNICATION -  none VOICE - appropriate for age and gender    INTEGUMENTARY  no suspicious head and neck lesions    HEENT  HEAD: Normocephalic, without obvious abnormality, atraumatic  FACE: INSPECTION - Symmetric, no signs of trauma, no suspicious lesion(s)  PALPATION -  No masses SALIVARY GLANDS - non-tender with no appreciable mass  STRENGTH - facial symmetry  NECK/THYROID: normal atraumatic, no neck masses, normal thyroid, no jvd    EYES  Normal occular alignment and mobility with no visible nystagmus at rest    EARS/NOSE/MOUTH/THROAT  EARS  PINNAE AND EXTERNAL EARS - no suspicious lesion OTOSCOPIC EXAM (surgical microscopy was used for visualization/instrumentation): EAR EXAM -  wax cleared with alligators on left for visualization and Normal ear canals, tympanic membranes and mobility,  and middle ear spaces bilaterally.  HEARING - grossly intact to voice/finger rub    NOSE AND SINUSES  EXTERNAL NOSE -  small area of superfical ulceration and dryness w/o suspicious lesion right upper outer nasal vestibule   SEPTUM - normal/no obstruction on anterior exam without decongestion TURBINATES - within normal limits MUCOSA - within normal limits     MOUTH AND THROAT   ORAL CAVITY, LIPS, TEETH, GUMS & TONGUE - moist, no suspicious lesions  OROPHARYNX /TONSILS/PHARYNGEAL WALLS/HYPOPHARYNX - no erythema or exudates  NASOPHARYNX - limited mirror exam - unable to visualize due to anatomy/gag  LARYNX -  - limited mirror exam - unable to visualize due to anatomy/gag      CHEST AND LUNG   INSPECTION & AUSCULTATION - normal effort, no stridor    CARDIOVASCULAR  AUSCULTATION & PERIPHERAL VASCULAR - regular rate and rhythm.    NEUROLOGIC  MENTAL STATUS - alert, interactive CRANIAL NERVES - normal    ROYAL-HALLPIKE - RIGHT - no nystagmus LEFT - no nystagmus  HEAD THRUSTS - Catch up saccades absent bilaterally   FUKUDA - normal/midline  RHOMBERG - Tandem (no) - normal  GAIT - 3. Normal: Pivot turns safely within 3 seconds and stops quickly with no loss of balance    LYMPHATIC  HEAD AND NECK - normal    Procedure(s):  Cerumen removal performed.  See procedure note.    Labs:  WBC   Date Value Ref Range Status   01/09/2014 6.88 3.90 - 12.70 K/uL Final     Hemoglobin   Date Value Ref Range Status   01/09/2014 12.8 12.0 - 16.0 g/dL Final     Platelets   Date Value Ref Range Status   01/09/2014 182 150 - 350 K/uL Final     Creatinine   Date Value Ref Range Status   06/09/2022 1.1 0.5 - 1.4 mg/dL Final     TSH   Date Value Ref Range Status   10/12/2022 2.483 0.400 - 4.000 uIU/mL Final     Glucose   Date Value Ref Range Status   06/09/2022 89 70 - 110 mg/dL Final     Hemoglobin A1C   Date Value Ref Range Status   06/09/2022 5.7 (H) 4.0 - 5.6 % Final     Comment:     ADA Screening Guidelines:  5.7-6.4%  Consistent with  "prediabetes  >or=6.5%  Consistent with diabetes    High levels of fetal hemoglobin interfere with the HbA1C  assay. Heterozygous hemoglobin variants (HbS, HgC, etc)do  not significantly interfere with this assay.   However, presence of multiple variants may affect accuracy.           Assessment:      Problem List Items Addressed This Visit          Neuro    S/P lumbar fusion       ENT    Hearing loss - Primary     Other Visit Diagnoses       Peripheral vertigo, unspecified laterality        Panic attacks        Migrainous vertigo        Nasal ulcer        Itching of ear        Ceruminosis, right                     Plan:       No indication of any active BPPV ( positional, crystal induced vertigo) though there is this reported history of having this which has responded to physical therapy.  Without persistent imbalance or BPPV there is no clear indication for vestibular therapy at this time.  Likely cause for episodes ( at least 3) over the course of several years of intense dizziness and funny feeling in the head nausea may all be attributable to vestibular migraine or migraine associated dizziness.  There is no indication of Meniere's disease on prior audiologic testing or classic triad symptoms of Meniere's.  Also a potential cause might be panic attacks associated with PTSD.  As discussed these do not necessarily come in times of stress but very much can be associated with dizziness a funny feeling in the head and the "feeling I am going to die" feeling experienced with this most recent episode.  Sleep hygiene as discussed going to sleep and waking the same time every day with no blue screens for at least 1-2 hours before sleeping and only sleeping in bed no reading or television.  Work on mindfulness and meditation.  Work on decreasing the Ambien as discussed by others and increasing melatonin including long-acting melatonin.        The right-sided nasal alar ulceration and crusting does not need biopsy at this " time.  Applying Aquaphor to this area every night at bedtime will likely allowed to fully resolve without recurrent flare up.  If bleeding or soreness persists return sooner for further evaluation including possible biopsy though not recommended at this time.      Ear care for wax and dry ears with a few drops of mineral oil in the ears 2 to 3 times a week.  Allowed to run in an run out wipe it with a tissue no Q-tips.    Repeat audiologic testing and vestibular testing ( VNG) prior to follow-up in 3 months.  Exercise and migraine information as provided.  Communicate with the office or return sooner with any worsening of symptoms or other concerns.  If there are any alarming persistent neurologic symptoms a repeat visit to the emergency room is appropriate.  Keep an eye on the blood pressure which seems to go quite high at times.    Keeping a food, activity, symptom, and blood pressure/ heart rate log may also be helpful.

## 2022-10-27 ENCOUNTER — OFFICE VISIT (OUTPATIENT)
Dept: PRIMARY CARE CLINIC | Facility: CLINIC | Age: 77
End: 2022-10-27
Payer: MEDICARE

## 2022-10-27 VITALS
SYSTOLIC BLOOD PRESSURE: 144 MMHG | BODY MASS INDEX: 27.89 KG/M2 | HEART RATE: 60 BPM | OXYGEN SATURATION: 97 % | WEIGHT: 151.56 LBS | HEIGHT: 62 IN | DIASTOLIC BLOOD PRESSURE: 66 MMHG

## 2022-10-27 DIAGNOSIS — L81.4 SKIN SPOTS-AGING: ICD-10-CM

## 2022-10-27 DIAGNOSIS — Z23 NEED FOR PNEUMOCOCCAL VACCINATION: ICD-10-CM

## 2022-10-27 DIAGNOSIS — Z23 NEED FOR SHINGLES VACCINE: Primary | ICD-10-CM

## 2022-10-27 PROCEDURE — 99999 PR PBB SHADOW E&M-EST. PATIENT-LVL III: CPT | Mod: PBBFAC,,, | Performed by: NURSE PRACTITIONER

## 2022-10-27 PROCEDURE — 99213 OFFICE O/P EST LOW 20 MIN: CPT | Mod: PBBFAC,PN | Performed by: NURSE PRACTITIONER

## 2022-10-27 PROCEDURE — 99999 PR PBB SHADOW E&M-EST. PATIENT-LVL III: ICD-10-PCS | Mod: PBBFAC,,, | Performed by: NURSE PRACTITIONER

## 2022-10-27 PROCEDURE — 99213 OFFICE O/P EST LOW 20 MIN: CPT | Mod: S$PBB,,, | Performed by: NURSE PRACTITIONER

## 2022-10-27 PROCEDURE — 99213 PR OFFICE/OUTPT VISIT, EST, LEVL III, 20-29 MIN: ICD-10-PCS | Mod: S$PBB,,, | Performed by: NURSE PRACTITIONER

## 2022-10-27 NOTE — PROGRESS NOTES
"Subjective:       Patient ID: Brandi Manuel is a 77 y.o. female.    Chief Complaint: Follow-up Clinical Reassessment    Ms. Brandi Manuel is a 77 year old female, established with me, presents to the clinic for follow up.  PCP is Hao Chau. Medical and surgical history in addition to problem list reviewed as listed below.    Presents for follow up, no further complaints of vertigo and patient states rhinitis has resolved. Seen by ENT last week, No indication of any active BPPV. Patient states she stopped taking Meclizine, " I was in a fog when I was taking it, I feel much better since I haven't been taking it." Continues to take Ambien and Tramadol, trying to wean.    Complains of aging spots on skin, requesting referral to dermatology.              No past medical history on file.     Past Surgical History:   Procedure Laterality Date    BREAST BIOPSY      HYSTERECTOMY      SHOULDER SURGERY      TONSILLECTOMY          Family History   Problem Relation Age of Onset    Breast cancer Daughter        Social History     Tobacco Use   Smoking Status Never   Smokeless Tobacco Never       Social History     Social History Narrative    Not on file       Review of patient's allergies indicates:   Allergen Reactions    Codeine     Hydrocodone-acetaminophen Nausea Only        Review of Systems   Constitutional:  Negative for chills and fever.   Respiratory:  Negative for cough, chest tightness and shortness of breath.    Cardiovascular:  Negative for palpitations.   Gastrointestinal:  Negative for nausea and vomiting.   Skin:         Aging spots distributed throughout body   Neurological:  Negative for dizziness, light-headedness and headaches.       Objective:        Vitals:    10/27/22 0809   BP: (!) 144/66   Pulse: 60        Physical Exam  Constitutional:       Appearance: Normal appearance.   HENT:      Nose: No congestion or rhinorrhea.   Eyes:      Conjunctiva/sclera: Conjunctivae normal.   Cardiovascular:      Rate " and Rhythm: Normal rate.      Pulses: Normal pulses.   Pulmonary:      Effort: Pulmonary effort is normal.   Musculoskeletal:         General: Normal range of motion.   Skin:     General: Skin is warm and dry.   Neurological:      Mental Status: She is alert and oriented to person, place, and time.       Assessment:       1. Need for shingles vaccine    2. Need for pneumococcal vaccination    3. Skin spots-aging        Plan:       Need for shingles vaccine  -     zoster vaccine live, PF, (ZOSTAVAX, PF,) 19,400 unit/0.65 mL injection; Inject 19,400 Units into the skin once. for 1 dose  Dispense: 1 each; Refill: 0    Need for pneumococcal vaccination  -     pneumococcal vaccine (PNU-IMMUNE 23) 25 mcg/0.5 mL injection; Inject 0.5 mLs into the muscle once. 1 of 2 for 1 dose  Dispense: 0.5 mL; Refill: 0    Skin spots-aging  -     Ambulatory referral/consult to Dermatology; Future; Expected date: 10/27/2022    Decrease use of Tramadol, wean off  Ambien, supplement with melatonin as needed as needed.     Medication List with Changes/Refills   Current Medications    ACETAMINOPHEN (TYLENOL) 650 MG TBSR    Take 1,300 mg by mouth once daily.    DICLOFENAC SODIUM 2 % SOPK    Apply 40 mg topically 2 (two) times a day.    MECLIZINE (ANTIVERT) 25 MG TABLET    Take 1 tablet (25 mg total) by mouth 2 (two) times daily as needed for Dizziness.    MIRTAZAPINE (REMERON) 7.5 MG TAB    Take 7.5 mg by mouth every evening.    TRIAMCINOLONE ACETONIDE 0.1% (KENALOG) 0.1 % OINTMENT    Apply topically 3 (three) times daily.    ZOLPIDEM (AMBIEN CR) 12.5 MG CR TABLET    Take 12.5 mg by mouth.            Follow up if symptoms worsen or fail to improve.    eJssica Hughes APRN, MSN, FNP-C

## 2022-10-29 PROBLEM — L81.4 SKIN SPOTS-AGING: Status: ACTIVE | Noted: 2022-10-29

## 2022-11-14 ENCOUNTER — CLINICAL SUPPORT (OUTPATIENT)
Dept: AUDIOLOGY | Facility: CLINIC | Age: 77
End: 2022-11-14
Payer: MEDICARE

## 2022-11-14 DIAGNOSIS — H81.8X9 OTHER DISORDERS OF VESTIBULAR FUNCTION, UNSPECIFIED EAR: ICD-10-CM

## 2022-11-14 PROCEDURE — 92540 BASIC VESTIBULAR EVALUATION: CPT | Mod: 26,S$PBB,, | Performed by: AUDIOLOGIST

## 2022-11-14 PROCEDURE — 92540 BASIC VESTIBULAR EVALUATION: CPT | Mod: PBBFAC | Performed by: AUDIOLOGIST

## 2022-11-14 PROCEDURE — 92540 PR VESTIBULAR EVAL NYSTAG FOVL&PERPH STIM OSCIL TRACKING: ICD-10-PCS | Mod: 26,S$PBB,, | Performed by: AUDIOLOGIST

## 2022-11-14 PROCEDURE — 92550 TYMPANOMETRY & REFLEX THRESH: CPT | Mod: PBBFAC | Performed by: AUDIOLOGIST

## 2022-11-14 PROCEDURE — 92537 PR CALORIC VSTBLR TEST W/REC BITHERMAL: ICD-10-PCS | Mod: 26,S$PBB,, | Performed by: AUDIOLOGIST

## 2022-11-14 PROCEDURE — 92537 CALORIC VSTBLR TEST W/REC: CPT | Mod: 26,S$PBB,, | Performed by: AUDIOLOGIST

## 2022-11-14 PROCEDURE — 92537 CALORIC VSTBLR TEST W/REC: CPT | Mod: PBBFAC | Performed by: AUDIOLOGIST

## 2022-11-14 PROCEDURE — 92557 COMPREHENSIVE HEARING TEST: CPT | Mod: PBBFAC | Performed by: AUDIOLOGIST

## 2022-11-14 NOTE — PROGRESS NOTES
Ms. Brandi Manuel was seen in the clinic today for an audiological evaluation prior to a VNG evaluation.    Audiological testing revealed essentially normal hearing, with a mild hearing loss noted at 3000 Hz, for the right ear and essentially normal hearing, with a mild sensorineural hearing loss noted from 7442-4260 Hz, for the left ear.  A speech reception threshold was obtained at 10 dBHL for the right ear and at 0 dBHL for the left ear.  Speech discrimination was 100% for the right ear and 100% for the left ear.      Tympanometry testing revealed a Type A tympanogram for the right ear and a Type As tympanogram for the left ear.  Ipsilateral acoustic reflexes were present at 1000 Hz and 2000 Hz for the right ear and were present at 1000 Hz for the left ear.    Recommendations:  1. Otologic evaluation  2. Annual audiological evaluation  3. Hearing protection when in noise

## 2022-11-14 NOTE — PROGRESS NOTES
VNG Evaluation    Referring physician:  Dr. Anthony    77 y.o. female complains of nausea and vomiting.  Symptoms were provoked by turning her head to look out of a window at a restaurant and have been 1 isolated episode over the past 1 month.  Ms. Manuel stated this episode on October 9, 2022, was not like previous vertiginous episodes.  She stated that she did not feel as though she was spinning and after the initial event she felt foggy for quite a few days.  Ms. Manuel reportedly took Ambien last night but reportedly has been taking this medication for longer than one year.    Gaze nystagmus was absent.  Oculomotor function was abnormal: saccadic velocity, smooth pursuit gain, and optokinetic gain.  Spontaneous nystagmus was absent.  The head-hanging left Hallpike revealed <clinically insignificant, non-fatiguing> nystagmus: 5 d/s up-beating in the supine position.  The head-hanging right Hallpike revealed <clinically insignificant> nystagmus: 1 d/s left-beating + 5 d/s up-beating in the supine position.  Static positional testing revealed <clinically insignificant> nystagmus: 5 d/s up-beating in the head center supine position and 3 d/s up-beating in the head right position.  Unilateral weakness: 17% (left)  Directional preponderance: 14% (right-beating)  RW: 30 d/s  LW: 19 d/s  RC: 11 d/s  LC: 10 d/s  Fixation suppression was abnormal for both right caloric irrigations.    Impression: VNG results show evidence of an incompletely compensated, non-localizing vestibular abnormality accompanied by central vestibular and central oculomotor dysfunction.  Today's results should be interpreted with caution due to Ms. Manuel's use of Ambien last night.    Recommendations:   1. Referral to Neurology.  2. Consider a trial with formal vestibular therapy if symptoms return.  3. Follow-up with Dr. Anthony to review the results of today's evaluation.

## 2022-11-16 ENCOUNTER — TELEPHONE (OUTPATIENT)
Dept: OTOLARYNGOLOGY | Facility: CLINIC | Age: 77
End: 2022-11-16
Payer: MEDICARE

## 2022-11-16 DIAGNOSIS — H81.399 PERIPHERAL VERTIGO, UNSPECIFIED LATERALITY: Primary | ICD-10-CM

## 2022-11-16 NOTE — TELEPHONE ENCOUNTER
Pt calling for VNG/Audiogram results from 11/14/22. Results in Epic. Does not use the portal. Please advise. ThanksChristiane

## 2022-11-16 NOTE — TELEPHONE ENCOUNTER
----- Message from Emma Leung sent at 11/16/2022 11:04 AM CST -----  Regarding: Test Results  Contact: Patient  Type:  Test Results    Who Called: Patient   Name of Test (Lab/Mammo/Etc): ELECTRO NYSTAGMOGRAPHY and Audiogram   Date of Test:  11/14/2022   Ordering Provider: Raj   Where the test was performed: [facility]   Would the patient rather a call back or a response via [facility]? Call back   Best Call Back Number: [phone]  Additional Information:  Patient stated she does not have access to [facility] account and she would like a call back to discuss results please assist

## 2022-11-16 NOTE — TELEPHONE ENCOUNTER
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  The left ear was a little stiffer than the right in terms of eardrum movement but not particularly concerning.       The vestibular testing was a little bit uncertain given the presence of the sleep agent the night before.  This can cause some abnormal central findings which were seen including eye tracking (optical kinetic) abnormality.  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  This means damage to the inner ear balance function which has not been compensated by the brain adapting.  The best treatment is vestibular therapy.       Repeat testing and further evaluation may prove necessary if not improving with vestibular therapy while not taking any vestibular suppressive medications.     This information should have been conveyed to her by audiology.  I am happy to see her in the office to discuss in greater detail if she needs further explanation and follow-up.

## 2022-11-16 NOTE — TELEPHONE ENCOUNTER
Called pt back with results. Went over results per Dr. Anthony. Pt states that she goes to [facility] for her back. She will check with them tomorrow to see if they do Vestibular Therapy. If they do, she would like the order sent there. If not, we will send order to [facility]. Thanks, Christiane

## 2022-11-18 NOTE — TELEPHONE ENCOUNTER
Called pt back. Pt states that [facility] in [facility] does Vestibular Therapy. Pt asked that order be sent to them. Please ro pended order and I will fax it Monday. Thanks, Christiane

## 2022-11-18 NOTE — TELEPHONE ENCOUNTER
----- Message from Lucretia Breen sent at 11/18/2022  9:49 AM CST -----  Regarding: pt called  Name of Who is Calling: SHAHID WANG [[id number]]      What is the request in detail: pt  would like to give the name of therapist should would like to go to [facility] [address] [address] [phone]      Can the clinic reply by [facility]: No      What [facility]: [phone]

## 2022-12-14 ENCOUNTER — PATIENT MESSAGE (OUTPATIENT)
Dept: PSYCHIATRY | Facility: CLINIC | Age: 77
End: 2022-12-14
Payer: MEDICARE

## 2022-12-14 ENCOUNTER — OFFICE VISIT (OUTPATIENT)
Dept: SLEEP MEDICINE | Facility: CLINIC | Age: 77
End: 2022-12-14
Payer: MEDICARE

## 2022-12-14 ENCOUNTER — TELEPHONE (OUTPATIENT)
Dept: OTOLARYNGOLOGY | Facility: CLINIC | Age: 77
End: 2022-12-14
Payer: MEDICARE

## 2022-12-14 VITALS
SYSTOLIC BLOOD PRESSURE: 170 MMHG | BODY MASS INDEX: 27.42 KG/M2 | DIASTOLIC BLOOD PRESSURE: 86 MMHG | WEIGHT: 149.94 LBS | HEART RATE: 67 BPM

## 2022-12-14 DIAGNOSIS — G47.30 SLEEP APNEA, UNSPECIFIED TYPE: Primary | ICD-10-CM

## 2022-12-14 DIAGNOSIS — H81.399 PERIPHERAL VERTIGO, UNSPECIFIED LATERALITY: Primary | ICD-10-CM

## 2022-12-14 DIAGNOSIS — G47.00 INSOMNIA, UNSPECIFIED TYPE: ICD-10-CM

## 2022-12-14 PROCEDURE — 99999 PR PBB SHADOW E&M-EST. PATIENT-LVL III: ICD-10-PCS | Mod: PBBFAC,,, | Performed by: NURSE PRACTITIONER

## 2022-12-14 PROCEDURE — 99999 PR PBB SHADOW E&M-EST. PATIENT-LVL III: CPT | Mod: PBBFAC,,, | Performed by: NURSE PRACTITIONER

## 2022-12-14 PROCEDURE — 99213 OFFICE O/P EST LOW 20 MIN: CPT | Mod: PBBFAC | Performed by: NURSE PRACTITIONER

## 2022-12-14 PROCEDURE — 99214 OFFICE O/P EST MOD 30 MIN: CPT | Mod: S$PBB,ICN,, | Performed by: NURSE PRACTITIONER

## 2022-12-14 PROCEDURE — 99214 PR OFFICE/OUTPT VISIT, EST, LEVL IV, 30-39 MIN: ICD-10-PCS | Mod: S$PBB,ICN,, | Performed by: NURSE PRACTITIONER

## 2022-12-14 RX ORDER — MELOXICAM 15 MG/1
7.5 TABLET ORAL DAILY
COMMUNITY

## 2022-12-14 NOTE — TELEPHONE ENCOUNTER
----- Message from Mago Quarles sent at 12/14/2022  2:46 PM CST -----  Type:  Patient Returning Call    Who Called: pt   Who Left Message for Patient: pt   Does the patient know what this is regarding?: pt need a call about her , [facility]  Would the patient rather a call back or a response via [facility]?  Call   Best Call Back [phone]   Additional Information: call back

## 2022-12-14 NOTE — PROGRESS NOTES
Referred by JESUS Hughes  CHIEF COMPLAINT:Insomnia    HISTORY OF PRESENT ILLNESS: She has never had a sleep study. Had insomnia since early 1970's,tried several meds ineffective. Seeing outside psychiatrist. If doesn't take her meds she can't function the next day. She will feel anxious if for instance on a trip and can't find her medication. She has tried to stop cold turkey and may get hot/cold sensation or nausea. Recently cut CR ambien tab and also took 2-3 Melatonin and didn't sleep. She is active during daytime. Attends back PT 3x/week and now vertigo therapy. BP can be high in office but on rechecks its normal. Sleep remains disrupted despite medications. +snores and  has concern that she may have CATERINA ?witnessed apneic pauses.     On todays [place] Sleepiness Scale the patient scores a 2/24.     BT in bed 7-8pm b/c tired and/or apply heat to lower legs  Asleep 10-11p typically  WT- 6a    FAMILY HISTORY: sister CATERINA  SOCIAL HISTORY: . Retired [employer] /disabled ( CATERINA, him coming to bed late applying mask, may disrupt her sleep)    HX 4 back surgeries, 2 ES tylenol and mobic qd (former opioid use)    EXAM:   BP (!) 170/86   Pulse 67   Wt 68 kg (149 lb 14.6 oz)   BMI 27.42 kg/m²     ASSESSMENT:   1. Insomnia NEC. 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  2. Unspecified Sleep Apnea, with symptoms of snoring, questionable witnessed apneic pauses,  disrupted sleep with medical comorbidities of chronic pain. Warrants further investigation for untreated sleep apnea.     PLAN:  Behavioral Modification:  Implement stimulus control: [place] bedroom for sleep only. CBT-I merits discussed/refer. Sleep restriction at least 11p-6a  Avoid CLOCK watching  Avoid thinking/worrying about sleep when trying to fall asleep  Limit caffeinated products  Make sure the bedroom is dark, quiet and cool  Sleep Hygiene handout  was given and discussed    Polysomnogram, discussed plan of care (OK to take ambien and/or remeron). Discussed etiology of CATERINA and potential ramifications of untreated CATERINA, including stroke, heart disease, HTN.  We discussed potential treatment options, which could include continuous positive airway pressure (CPAP-definitive), or referral for surgical consideration.     Continue at this time ambien CR 12.5mg and prn remeron until PSG resulted. Plan would  be slow taper down on dose ambien and higher remeron or vistaril adjunctive + improved sleep hygiene    Thank you for allowing me the opportunity to participate in the care of your patient

## 2022-12-14 NOTE — TELEPHONE ENCOUNTER
Called pt back. Pt states that she wants to change to get her Vestibular Rehab at [facility]. States that the facility she has been using for PT (last 6 months) and Vestibular Therapy is no longer allowing her to schedule visits at 7 am (states that she has had the 7 am appt slot for months) because of her insurance. Pt states that she would like new order sent to [facility]. She has already scheduled Physical Therapy with [facility] for tomorrow. Thanks, Christiane

## 2022-12-15 ENCOUNTER — CLINICAL SUPPORT (OUTPATIENT)
Dept: REHABILITATION | Facility: HOSPITAL | Age: 77
End: 2022-12-15
Attending: FAMILY MEDICINE
Payer: MEDICARE

## 2022-12-15 ENCOUNTER — TELEPHONE (OUTPATIENT)
Dept: SLEEP MEDICINE | Facility: OTHER | Age: 77
End: 2022-12-15
Payer: MEDICARE

## 2022-12-15 ENCOUNTER — CLINICAL SUPPORT (OUTPATIENT)
Dept: REHABILITATION | Facility: HOSPITAL | Age: 77
End: 2022-12-15
Attending: ORTHOPAEDIC SURGERY
Payer: MEDICARE

## 2022-12-15 DIAGNOSIS — Z98.1 S/P SPINAL FUSION: ICD-10-CM

## 2022-12-15 DIAGNOSIS — R42 VERTIGO: ICD-10-CM

## 2022-12-15 DIAGNOSIS — H81.399 PERIPHERAL VERTIGO, UNSPECIFIED LATERALITY: ICD-10-CM

## 2022-12-15 PROCEDURE — 97161 PT EVAL LOW COMPLEX 20 MIN: CPT | Mod: PO

## 2022-12-15 NOTE — PLAN OF CARE
"[facility]  Physical Therapy Neurological Rehabilitation Initial Evaluation    Name: Brandi Manuel  Clinic Number: [id number]    Therapy Diagnosis:   Encounter Diagnoses   Name Primary?    Peripheral vertigo, unspecified laterality     Vertigo      Physician: Abad Anthony MD    Physician Orders: PT Eval and Treat "Vestibular Therapy"  Medical Diagnosis from Referral: H81.399 (ICD-10-CM) - Peripheral vertigo, unspecified laterality   Evaluation Date: 12/15/2022  Insurance Authorization Period: 12/14/2022 - 12/14/2023  Plan of Care Expiration: 01/31/2023   Visit # / Visits authorized: 01/ 01 pending additional authorization    Time In: 1130  Time Out: 1215  Total Billable Time: 45 minutes    Precautions: Standard    Subjective   Date of onset: October  History of current condition - Brandi reports an vertigo episode while in a restaurant. She turned to look out the window and feels that she loss control of body and became nauseated. She denies spinning vertigo. ED workup was negative. She reports a strong family hx of vertigo. She currently reports no dizziness since that October episode.   Triggers: n/a   Description of symptoms: n/a   Duration of symptoms: n/a    Visual changes: None   Hearing Changes (hearing loss or tinnitus): None   Recent history of upper respiratory infection or GI infection: None known    Currently taking Meclizine: No     Pts goals: No how to get myself out of vertigo episodes     Systems screening  Cardiovascular indicators: None    Is patient currently taking medication for stated indicators: N/A  Neurological: None     Medical History:   No past medical history on file.    Surgical History:   Brandi Manuel  has a past surgical history that includes Tonsillectomy; Hysterectomy; Shoulder surgery; and Breast biopsy.    Medications:   Brandi has a current medication list which includes the following prescription(s): acetaminophen, diclofenac sodium, " "meclizine, meloxicam, mirtazapine, triamcinolone acetonide 0.1%, and zolpidem.    Allergies:   Review of patient's allergies indicates:   Allergen Reactions    Codeine     Hydrocodone-acetaminophen Nausea Only        Imaging: "Multilevel degenerative changes status post L4-5 posterior instrumented fusion with interbody spacer and posterior laminectomy. Multilevel neural foraminal narrowing as described above and moderate central spinal canal stenosis of L3-4. Findings are similar compared to prior exam." 07/2022    Vestibular Function Testing: "VNG results show evidence of an incompletely compensated, non-localizing vestibular abnormality accompanied by central vestibular and central oculomotor dysfunction.  Today's results should be interpreted with caution due to Ms. Manuel's use of Ambien last night." 11/2022   Audiogram: "Audiological testing revealed essentially normal hearing, with a mild hearing loss noted at 3000 Hz, for the right ear and essentially normal hearing, with a mild sensorineural hearing loss noted from 1923-4590 Hz, for the left ear." 11/2022    Prior Therapy: 2 weeks of vestibular therapy  Social History: Lives with spouse (primary caregiver due disabled )   Falls: None   DME: Walker, Shower chair   Home Environment: 2 story with stair lift   Exercise Routine / History: Yard work, Walking at park   Family Present at time of Eval: No   Occupation: Retired  Prior Level of Function: Independent with ADLs, functional mobility, and recreational activities  Current Level of Function: Independent with ADLs, functional mobility, and recreational activities    Pain: Patient denies pain     Objective   Outcome Measure:   Dizziness Handicap Inventory: DNT    SYSTEMS SCREEN    - Follows commands: 100% of time   - Speech: no deficits     Mental status: normal mood, behavior, speech, dress, motor activity, and thought processes  Appearance: Casually dressed  Behavior:  calm and cooperative  Attention " Span and Concentration:  Normal    Posture Alignment: WFL     Sensation: DNT         Coordination: DNT     UPPER EXTREMITY--AROM/PROM  (R) UE: WFLs  (L) UE: WFLs         RANGE OF MOTION--LOWER EXTREMITIES  (R) LE Hip: normal   Knee: normal   Ankle: normal    (L) LE: Hip: normal   Knee: normal   Ankle: normal    Strength: DNT    ROM:   CERVICAL SPINE  Flexion: WFL   Extension: WFL    L side bend: WFL   R side bend: WFL   L rotation: WFL   R rotation: WFL   Are concurrent symptoms present with any of these movements: As noted above no    Modified VAS (Vertebral Artery Screen), in sitting (rotation, then extension):  R: Negative  L: Negative    VESTIBULAR EXAMINATION    Oculomotor Screen in room light (fixation present):   Known eye dysfunction: None   Ocular ROM: WNL    Tracking/Smooth Pursuits: Intact  Saccades: Mild dysmetria in vertical direction   Convergence: WNL    Spontaneous Nystagmus: Absent   Gaze Holding Nystagmus: Absent     Slow VOR Screen: WNL  VOR Cancellation: DNT  Head Thrust Test: Consistent loss of gaze to the R    Oculomotor Screen with fixation suppressed (Infrared goggles):  Spontaneous Nystagmus: DNT   Gaze Holding Nystagmus: DNT   Head Shaking Nystagmus: DNT     Dynamic Visual Acuity: 2 line loss    POSITIONAL CANAL TESTING: DNT. Will be assessed at follow up visit.    Functional Gait Assessment:   1. Gait on level surface =  2   (3) Normal: less than 5.5 sec, no A.D., no imbalance, normal gait pattern, deviates< 6in   (2) Mild impairment: 7-5.6 sec, uses A.D., mild gait deviations, or deviates 6-10 in   (1) Moderate impairment: > 7 sec, slow speed, imbalance, deviates 10-15 in.   (0) Severe impairment: needs assist, deviates >15 in, reach/touch wall  2. Change in Gait Speed = 3   (3) Normal: smooth change w/o loss of balance or gait deviation, deviates < 6 in, significant difference between speeds   (2) Mild impairment: changes speed, but demonstrates mild gait deviations, deviates 6-10 in, OR  no deviations but unable to significantly speed, OR uses A.D.   (1) Moderate impairment: minor changes to speed, OR changes speed w/ significant deviations, deviates 10-15 in, OR  Changes speed , but loses balance & recovers   (0) Severe impairment: cannot change speed, deviates >15 in, or loses balance & needs assist  3. Gait with horizontal head turns  = 2   (3) Normal: no change in gait, deviates <6 in   (2) Mild impairment: slight change in speed, deviates 6-10 in, OR uses A.D.   (1) Moderate impairment: moderate change in speed, deviates 10-15 in   (0) Severe impairment: severe disruption of gait, deviates >15in  4. Gait with vertical head turns = 3   (3) Normal: no change in gait, deviates <6 in   (2) Mild impairment: slight change in speed, deviates 6-10 in OR uses A.D.   (1) Moderate impairment: moderate change in speed, deviates 10-15 in   (0) Severe impairment: severe disruption of gait, deviates >15 in  5. Gait with pivot turns = 3   (3) Normal: performs safely in 3 sec, no LOB   (2) Mild impairment: performs in >3 sec & no LOB, OR turns safely & requires several steps to regain LOB   (1) Moderate impairment: turns slow, OR requires several small steps for balance following turn & stop   (0) Severe impairment: cannot turn safely, needs assist  6. Step over obstacle = 3   (3) Normal: steps over 2 stacked boxes w/o change in speed or LOB   (2) Mild impairment: able to step over 1 box w/o change in speed or LOB   (1) Moderate impairment: steps over 1 box but must slow down, may require VC   (0) Severe impairment: cannot perform w/o assist  7. Gait with Narrow JAIRO = 2   (3) Normal: 10 steps no staggering   (2) Mild impairment: 7-9 steps   (1) Moderate impairment: 4-7 steps   (0) Severe impairment: < 4 steps or cannot perform w/o assist  8. Gait with eyes closed = 3   (3) Normal: < 7 sec, no A.D., no LOB, normal gait pattern, deviates <6 in   (2) Mild impairment: 7.1-9 sec, mild gait deviations, deviates 6-10  in   (1) Moderate impairment: > 9 sec, abnormal pattern, LOB, deviates 10-15 in   (0) Severe impairment: cannot perform w/o assist, LOB, deviates >15in  9. Ambulating Backwards = 3   (3) Normal: no A.D., no LOB, normal gait pattern, deviates <6in   (2) Mild impairment: uses A.D., slower speed, mild gait deviations, deviates 6-10 in   (1) Moderate impairment: slow speed, abnormal gait pattern, LOB, deviates 10-15 in   (0) Severe impairment: severe gait deviations or LOB, deviates >15in  10. Steps = 2   (3) Normal: alternating feet, no rail   (2) Mild Impairment: alternating feet, uses rail   (1) Moderate impairment: step-to, uses rail   (0) Severe impairment: cannot perform safely    Score 26/30     Score:   <22/30 fall risk   <20/30 fall risk in older adults   <18/30 fall risk in Parkinsons        Postural control: MCTSIB: Evaluation 12/15/2022 (Average of 3 trials)   1. Eyes Open/feet together/Firm: 30 seconds   2. Eyes Closed/feet together/Firm:  30 seconds   3. Eyes Open/feet together/Foam:  30 seconds   4. Eyes Closed/feet together/Foam:  30 seconds - mod sway   TOTAL 120/120     CMS Impairment/Limitation/Restriction for FOTO Vestibular Survey not performed this date due to time constraints.        TREATMENT   No treatment provided today. All time spent on evaluation.     Home Exercises and Patient Education Provided    Education provided: Examination findings, POC, scheduling, goals of therapy, nature of vestibular rehab (acute vertigo is contraindication; we cannot prevent spontaneous vertigo attacks)    Written Home Exercises Provided: No. To be established at initial follow up session.     Assessment   Brandi is a 77 y.o. female referred to outpatient Physical Therapy with a medical diagnosis of Peripheral vertigo following a vertigo episode in October 2022. Patient was screened for impairments of cervical range, oculomotor function, gaze stabilization, and balance. All testing today was within normal  limits and was not suggestive of vestibular pathology. Most importantly, she reports no recent dizziness since the episode in October and feels that she has returned to prior level of function. Therefore, vestibular rehab is likely not indicated at this time. However, due to time constraints, motion tolerance testing and positional testing weren't performed this date. Patient agreed to follow up for 1-2 visits for additional motion tolerance testing as well as education regarding home canalith repositioning maneuvers (to be performed as indicated). PT does not suspect BPPV at this time, but positional testing will be performed to rule out. If no vestibular findings noted at next follow up, vestibular PT will be discharged at that time.     Pt prognosis is Good.   Pt will benefit from skilled outpatient Physical Therapy to address the deficits stated above and in the chart below, provide pt/family education, and to maximize pt's level of independence.     Plan of care discussed with patient: Yes   Pt's spiritual, cultural and educational needs considered and patient is agreeable to the plan of care and goals as stated below:     Anticipated Barriers for therapy: Scheduling    Medical Necessity is demonstrated by the following  History  Co-morbidities and personal factors that may impact the plan of care Co-morbidities:   N/a    Personal Factors:   no deficits     low   Examination  Body Structures and Functions, activity limitations and participation restrictions that may impact the plan of care Body Regions:   N/a    Body Systems:    N/a    Participation Restrictions:   None    Activity limitations:   Learning and applying knowledge  no deficits    General Tasks and Commands  no deficits    Communication  no deficits    Mobility  no deficits    Self care  no deficits    Domestic Life  no deficits    Interactions/Relationships  no deficits    Life Areas  no deficits    Community and Social Life  no deficits          low   Clinical Presentation stable and uncomplicated low   Decision Making/ Complexity Score: low     Goals:  Long Term Goals (LTG), 4 weeks.   Pt agrees to goals set. Eval date: 12/15/2022     Status   LTG: Patient will exhibit improved Motion Sensitivity Quotient within the Mild range, indicating minimal to no motion sensitivity.     Education required Ongoing       Plan   Plan of care Certification: 12/15/2022 to 01/31/2023.    Outpatient Physical Therapy 2 times weekly for 4 weeks to include the following interventions: Neuromuscular Re-ed, Patient Education, Self Care, Therapeutic Activities, Therapeutic Exercise, and CRT (as indicated).     Na Fischer, PT

## 2022-12-16 NOTE — PROGRESS NOTES
"  Physical Therapy Daily Treatment Note     Name: Brandi Manuel  Clinic Number: [id number]    Therapy Diagnosis:   Encounter Diagnosis   Name Primary?    Vertigo Yes     Physician: Abad Anthony, *    Visit Date: 12/19/2022    Physician Orders: PT Eval and Treat "Vestibular Therapy"  Medical Diagnosis from Referral: H81.399 (ICD-10-CM) - Peripheral vertigo, unspecified laterality   Evaluation Date: 12/15/2022  Insurance Authorization Period: 12/14/2022 - 12/14/2023  Plan of Care Expiration: 01/31/2023   Visit # / Visits authorized: 01/12 +eval     Time In: 1145  Time Out: 1208  Total Billable Time: 23 minutes     Precautions: Standard     PTA Visit #: 0/5     Missed visits: 0  Cancelled visits: 0  Subjective     Pt reports: no changes since prior visit. No dizziness and no new imbalance.  HEP not established.   Response to previous treatment: No adverse effects reported   Functional change: Ongoing     Pain: 0/10  Location:  n/a     Objective     POSITIONAL CANAL TESTING  Looking for nystagmus (slow phase followed by quick phase to the affected side for BPPV)    Alberto Hallpike (posterior / CL anterior)   Right : Negative nystagmus, Negative dizziness   Left: Negative nystagmus, Negative dizziness  Horizontal Canals   Right: Negative nystagmus, Negative dizziness   Left: Negative nystagmus, Negative dizziness    Treatment Performed: Not indicated    Motion Sensitivity Testing    Movement Intensity (0-5) Duration  <5s = 0  5 -10s = 1  11 - 30s = 2  >30s = 3 Score  Intensity + duration score   1. Siting to Supine 0/5 0 0   2. Supine to left side 0/5 0 0   3. Supine to right side 0/5 0 0   4. Supine to sitting 0/5 0 0   5. L Alberto Hallpike 0/5 0 0   6. Up from left 0/5 0 0   7. R Alberto Hallpike 0/5 0 0   8. Up from right 0/5 0 0   9. Sitting, head tipped to L knee 0/5 0 0   10. Head up from left knee 0/5 0 0   11. Sitting, head tipped to R knee 0/5 0 0   12. Head up from right knee 0/5 0 0   13. Sitting head turns (5) " 0/5 0 0   14. Sitting head pitches (5) 0/5 0 0   15. In stance, 180 degree turn to L 0/5 0 0   16. In stance, 180 degree turn to R 0/5 0 0     MSQ = Total score x (# of positions with symptoms)/20.48    MSQ = 0    Interpretation:   Mild: 0 - 10  Moderate: 11 - 30  Severe: 31 - 100      Treatment     Brandi participated in neuromuscular re-education activities to assess motion tolerance for 23 minutes.     Includes time for objective measurements. See above.     Home Exercises Provided and Patient Education Provided     Education provided:   - HEP, POC, Proper technique with therapeutic interventions, Benefits/purpose of today's therapeutic interventions    Written Home Exercises Provided: yes.  Exercises were reviewed and Brandi was able to demonstrate them prior to the end of the session.  Brandi demonstrated good  understanding of the education provided.     See EMR under Patient Instructions for exercises provided 12/19/2022.    Goals:   Long Term Goals (LTG), 4 weeks.   Pt agrees to goals set. Eval date: 12/15/2022       12/19/2022 Status   LTG: Patient will exhibit improved Motion Sensitivity Quotient within the Mild range, indicating minimal to no motion sensitivity.     To be assessed Motion Sensitivity Quotient = 0 Ongoing        PHYSICAL THERAPY ASSESSMENT & DISCHARGE SUMMARY     Brandi presented for follow up vestibular assessment. All testing at initial evaluation was within normal limits and was not suggestive of vestibular pathology. Today, she continues to report no recent dizziness or new imbalance. Due to time constraints, positional testing and motion sensitivity testing was deferred to today. Positional testing was negative for BPPV and her Motion Sensitivity Quotient was 0, indicating no motion sensitivity. Educated patient on BPPV vs non-BPPV vertigo as well as Modified Epley to attempt at home in the case of BPPV. Further vestibular rehab is not indicated at this time.     Discharge reason:  Resolution of symptoms    PLAN   This patient is discharged from Outpatient Physical Therapy Services.     Na Fischer, PT  12/19/2022

## 2022-12-17 ENCOUNTER — HOSPITAL ENCOUNTER (OUTPATIENT)
Dept: SLEEP MEDICINE | Facility: OTHER | Age: 77
Discharge: HOME | End: 2022-12-17
Attending: NURSE PRACTITIONER
Payer: MEDICARE

## 2022-12-17 DIAGNOSIS — G47.30 SLEEP APNEA, UNSPECIFIED TYPE: ICD-10-CM

## 2022-12-17 PROCEDURE — 95810 POLYSOM 6/> YRS 4/> PARAM: CPT

## 2022-12-18 PROBLEM — G47.30 SLEEP APNEA: Status: ACTIVE | Noted: 2022-12-18

## 2022-12-18 NOTE — PROGRESS NOTES
A baseline PSG study was performed on Brandi Manuel. Pt was informed of the whole process of setting up for the sleep study, the time to bed and time for wake, the reasons the tech may have to enter the room during the night. Pt was also informed that she may see the PAP mask for the last half of the night (split-night study) if she qualifies for it according to the insurance protocols. An after-visit summary was handed to the pt after the study.

## 2022-12-19 ENCOUNTER — CLINICAL SUPPORT (OUTPATIENT)
Dept: REHABILITATION | Facility: HOSPITAL | Age: 77
End: 2022-12-19
Attending: FAMILY MEDICINE
Payer: MEDICARE

## 2022-12-19 DIAGNOSIS — R42 VERTIGO: Primary | ICD-10-CM

## 2022-12-19 PROCEDURE — 97112 NEUROMUSCULAR REEDUCATION: CPT | Mod: PO

## 2022-12-20 ENCOUNTER — CLINICAL SUPPORT (OUTPATIENT)
Dept: REHABILITATION | Facility: HOSPITAL | Age: 77
End: 2022-12-20
Payer: MEDICARE

## 2022-12-20 DIAGNOSIS — R29.898 WEAKNESS OF BOTH LOWER EXTREMITIES: ICD-10-CM

## 2022-12-20 DIAGNOSIS — G89.29 CHRONIC BILATERAL LOW BACK PAIN WITHOUT SCIATICA: Primary | ICD-10-CM

## 2022-12-20 DIAGNOSIS — M54.50 CHRONIC BILATERAL LOW BACK PAIN WITHOUT SCIATICA: Primary | ICD-10-CM

## 2022-12-20 PROCEDURE — 97162 PT EVAL MOD COMPLEX 30 MIN: CPT | Mod: PO

## 2022-12-20 NOTE — PLAN OF CARE
"[facility]   Physical Therapy Initial Evaluation     Date: 12/20/2022   Name: Brandi Manuel  Clinic Number: [id number]    Therapy Diagnosis:   Encounter Diagnoses   Name Primary?    Chronic bilateral low back pain without sciatica Yes    Weakness of both lower extremities      Physician: Levar Singletary MD    Physician Orders: PT Eval and Treat   Medical Diagnosis from Referral: Z98.1 (ICD-10-CM) - S/P spinal fusion  Evaluation Date: 12/20/2022  Authorization Period Expiration: 12/31/2022  Plan of Care Expiration: 2/28/2023   Progress Note Due: 1/20/2023  Visit # / Visits authorized: 1/ pending   FOTO: 1/TBD    Precautions: Standard     Time In: 8:00 AM  Time Out: 8:40 AM  Total Appointment Time (timed & untimed codes): 40 minutes      SUBJECTIVE     Date of onset: Chronic    History of current condition - Brandi reports: she had a bad fusion (L4-5) in August 2018. She states that "glue was left on her nerves" and she had to be re-admitted. She has since been opened up 4 times - mostly due to antibiotic treatments due to infection. She also reports her L3 disc is ruptured. She has been in and out of therapy since the initial onset of in 2018. She wants to stay in therapy all year - she wants medicare to pay for her gym. She is still having pain with house work (I.e. mopping and sweeping). She feels globally restricted with her movements. She reports a lot of difficulty with getting off the floor.     She reports right leg burning with sleeping - she takes Ambien but they are trying to get her off of this. She sleeps with a heating pad for all hours of the night.     She states that she can walk 2 miles when the weather is good. She has a treadmill and bike. She feels like she can do a program at home - since she has been in therapy since June 2022 without a break.     She wants to avoid surgery. She reports a lot of stress with managing her conditions and her husbands conditions. "     Falls: None    Imaging, MRI studies:   FINDINGS:  Postoperative changes: L4-5 posterior spinal fusion with intervertebral disc spacer and posterior decompressive laminectomy.     Alignment: Grade 1 anterolisthesis of L4 on L5 similar to prior exam.     Vertebrae: Normal marrow signal. No fracture.     Discs: Mild intervertebral disc space narrowing of L2-3 and L3-4 with disc desiccation.     Cord: Normal.  Conus terminates at L1     Degenerative findings:     T12-L1: Mild diffuse broad-based disc bulge.  No central spinal canal stenosis or neural foraminal narrowing.     L1-L2: Mild diffuse broad-based disc bulge and facet arthropathy.  No central spinal canal stenosis or neural foraminal narrowing.     L2-L3: Diffuse broad-based disc bulge with superimposed central protrusion and bilateral facet arthropathy causing at most mild central spinal canal stenosis.  No neural foraminal narrowing.     L3-L4: Diffuse broad-based disc bulge, facet arthropathy, and ligamentum flavum hypertrophy causing moderate central spinal canal stenosis severe left and moderate right-sided neural foraminal narrowing.     L4-L5: Grade 1 anterolisthesis with uncovering of the disc and mild diffuse broad-based disc bulge causing severe right-sided and mild left-sided neural foraminal narrowing.  No central spinal canal stenosis.     L5-S1: Bilateral facet arthropathy.  No focal disc bulge, spinal canal stenosis, or neural foraminal narrowing.     Paraspinal muscles & soft tissues: Unremarkable.     Impression:     Multilevel degenerative changes status post L4-5 posterior instrumented fusion with interbody spacer and posterior laminectomy.     Multilevel neural foraminal narrowing as described above and moderate central spinal canal stenosis of L3-4.     Findings are similar compared to prior exam.    Prior Therapy: Yes - she has been receiving therapy all year at private practice location   Occupation: Retired   Prior Level of Function:  chronic pain and disability  Current Level of Function: chronic pain and disability     Pain:  Current 5/10, worst 9/10, best 3/10   Location: bilateral back  buttocks   Description: Aching, Dull, and Burning  Aggravating Factors: Standing, Bending, Walking, Flexing, Lifting, and Getting out of bed/chair  Easing Factors: pain medication and rest    Non-Mechanical Origin:  Night Pain?  No  Hx of Cancer?  No  Trauma?  No  Sudden bowel/bladder changes?  No  Bone Density compromise?  No    Patients goals: improve back pain     Medical History:   No past medical history on file.    Surgical History:   Brandi Manuel  has a past surgical history that includes Tonsillectomy; Hysterectomy; Shoulder surgery; and Breast biopsy.    Medications:   Brandi has a current medication list which includes the following prescription(s): acetaminophen, diclofenac sodium, meclizine, meloxicam, mirtazapine, triamcinolone acetonide 0.1%, and zolpidem.    Allergies:   Review of patient's allergies indicates:   Allergen Reactions    Codeine     Hydrocodone-acetaminophen Nausea Only        OBJECTIVE     MOVEMENT EXAMINATION   Flexion 50% mid knee - tightness    Repeated Flexion Not Tested    Extension 50% - tightness   Repeated Extension Not Tested    Right Side Bending 25% - tightness   Left Side Bending 25% - tightness    Right Rotation 50% - tightness   Left Rotation 50% - tightness    Right Quadrant Not Tested    Left Quadrant Not Tested      LOWER EXTREMITY STRENGTH    LEFT RIGHT   Knee Extension 5/5 5/5   Knee Flexion 5/5 5/5     Hip Flexion 4-/5 4-/5   Hip Abduction 3+/5 3+/5   Hip Extension 4-/5 4-/5   Hip ER 4-/5 4-/5   Hip IR 4-/5 4-/5   Heel Raise Not Tested  Not Tested    Ankle Dorsiflexion 5/5 5/5     FLEXIBILITY   Hamstring  < 75 deg on right, >90 on left    Prone Quadriceps Not Tested    Hip Extension Able to achieve 10 deg    Hip Internal Rotation Limited bilaterally - painless    Hip External Rotation Within normal limits     Piriformis Limited bilaterally - increased right leg pain with stretch    Dorsiflexion Not Tested      SPECIAL TESTS    LEFT RIGHT   Hip Scour (hip involvement) - -   Straight Leg Raise (passive) - -   Active Straight Leg Raise  - +   Edwin - -   Obers  Not Tested  Not Tested    Prone Instability Not Tested  Not Tested    MARYAN - +   Slump - -     GAIT: Brandi ambulates with no assistive device with independently.     Limitation/Restriction for FOTO DUC Survey    Therapist reviewed FOTO scores for Brandi Manuel on 12/20/2022.   FOTO documents entered into [employer] - see Media section.    Limitation Score: 52.3%       TREATMENT     Total Treatment time (time-based codes) separate from Evaluation: 0 minutes     PATIENT EDUCATION AND HOME EXERCISES     Education provided:   - Building a home exercise program for home maintenance.   - Purpose of physical therapy intervention and hopes for d/c for home management  - decreased ROM following fusion  - Pt/family was provided educational information, including: role of PT, goals for PT, scheduling - pt verbalized understanding. Discussed insurance limitations with pt.     Written Home Exercises Provided: yes. Exercises were reviewed and Brandi was able to demonstrate them prior to the end of the session.  Brandi demonstrated good  understanding of the education provided. See EMR under Patient Instructions for exercises provided during therapy sessions.    ASSESSMENT     Brandi is a 77 y.o. female referred to outpatient Physical Therapy with a medical diagnosis of s/p lumbar fusion in 2018 that was complicated by 4 other surgeries due to infection. Patient has been in and out of therapy since 2018. Patient presents with limited lumbar ROM due to tightness, weak proximal hip musculature and poor core control. 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 Patient agreeable to building a sustainable  home exercise program in order to d/c for home management due to chronicity of condition and being in therapy all year.    Medical necessity is demonstrated by the following IMPAIRMENTS/PROBLEMS:  -Decreased function (DUC)   -Increased pain (NPS)   -Decreased pain free lumbar AROM   -Impaired standing posture  -Decreased LE strength (MMT)   -Decreased LE mobility   -Decreased core stability (plank)  -Decreased participation in regular exercise routine    Intervention strategies designed to address these impairments will be instrumental in achieving the stated functional goals, including her ability to safely perform mobility tasks. Based on the examination, the patient's rehabilitation potential to achieve functional goals is good.    Patient prognosis is Fair.   Patient will benefit from skilled outpatient Physical Therapy to address the deficits stated above and in the chart below, provide patient /family education, and to maximize patientt's level of independence.     Plan of care discussed with patient: Yes  Patient's spiritual, cultural and educational needs considered and patient is agreeable to the plan of care and goals as stated below:     Anticipated Barriers for therapy: scheduling, chronicity of condition, primary caregiver    Medical Necessity is demonstrated by the following  History  Co-morbidities and personal factors that may impact the plan of care Co-morbidities:   high BMI    Personal Factors:   coping style  social background     moderate   Examination  Body Structures and Functions, activity limitations and participation restrictions that may impact the plan of care Body Regions:   back  lower extremities  trunk    Body Systems:    gross symmetry  ROM  strength  gross coordinated movement  balance  gait  transfers  transitions    Participation Restrictions:   Patient can not participate in pain free community activities.     Activity limitations:   Learning and applying knowledge  no  deficits    General Tasks and Commands  no deficits    Communication  no deficits    Mobility  lifting and carrying objects  walking  using transportation (bus, train, plane, car)    Self care  no deficits    Domestic Life  shopping  cooking  doing house work (cleaning house, washing dishes, laundry)  assisting others         moderate   Clinical Presentation evolving clinical presentation with changing clinical characteristics moderate   Decision Making/ Complexity Score: moderate     Goals:  Short Term Goals: 4 weeks   1. Patient demonstrates independence with HEP.   2. Patient demonstrates independence with Postural Awareness.   3. Patient will preform lumbar ROM pain free.     Long Term Goals: 8 weeks   1. Patient demonstrates advanced home exercise program for home management of condition.  2. Patient demonstrates increased strength BLE's to 4+/5 or greater to improve tolerance to functional activities.   3. Patient demonstrates improved overall function per Oswestry to 45% or less.   PLAN   Plan of care Certification: 12/20/2022 to 2/28/2023 .    Outpatient Physical Therapy 1 times weekly for 10 weeks to include the following interventions: Aquatic Therapy, Electrical Stimulation  , Gait Training, Manual Therapy, Moist Heat/ Ice, Neuromuscular Re-ed, Patient Education, Self Care, Therapeutic Activities, and Therapeutic Exercise.     Edna Aldrich, PT    Pt may be seen by PTA as part of the rehabilitation team.     Therapist: Edna Aldrich, PT  12/20/2022    I CERTIFY THE NEED FOR THESE SERVICES FURNISHED UNDER THIS PLAN OF TREATMENT AND WHILE UNDER MY CARE   Physician's comments:     Physician's Signature: ___________________________________________________

## 2022-12-27 DIAGNOSIS — G47.33 OSA (OBSTRUCTIVE SLEEP APNEA): Primary | ICD-10-CM

## 2022-12-27 PROCEDURE — 95810 POLYSOM 6/> YRS 4/> PARAM: CPT | Mod: 26,,, | Performed by: INTERNAL MEDICINE

## 2022-12-27 PROCEDURE — 95810 PR POLYSOMNOGRAPHY, 4 OR MORE: ICD-10-PCS | Mod: 26,,, | Performed by: INTERNAL MEDICINE

## 2022-12-27 NOTE — PROCEDURES
"[facility]    Polysomnography Interpretation Report    Patient Name:  Brandi Manuel  MRN#:  [id number]  :  1945  Study Date:  2022  Referring Provider:  MD Boby    Indications for Polysomnography:  The patient is a 77 year old Female who is 5' 2" and weighs 149.0 lbs.  Her BMI equals 27.4.  [place] was - and Neck Circumference was -.  A full night polysomnogram was performed to evaluate for -.    Polysomnogram Data  A full night polysomnogram recorded the standard physiologic parameters including EEG, EOG, EMG, EKG, nasal and oral airflow.  Respiratory parameters of chest and abdominal movements were recorded with [place]-Crystal motion transducers.  Oxygen saturation was recorded by pulse oximetry.    Sleep Architecture  The total recording time of the polysomnogram was 462.4 minutes.  The total sleep time was 389.0 minutes.  The patient spent 17.1% of total sleep time in Stage N1, 67.6% in Stage N2, 0.0% in Stages N3, and 15.3% in REM.  Sleep latency was 6.1 minutes.  REM latency was 195.5 minutes.  Sleep Efficiency was 84.1%.  Wake after sleep onset was 67.5 minutes.    Respiratory Events  The polysomnogram revealed a presence of 10 obstructive, - central, and - mixed apneas resulting in a Total Apnea index of 1.5 events per hour.  There were 49 hypopneas resulting in a Total Hypopnea index of 7.6 events per hour.  The combined Apnea/Hypopnea index was 9.1 events per hour.  There were a total of 12 RERA events resulting in a Respiratory Disturbance Index (RDI) of 11.0 events per hour.     Mean oxygen saturation was 96.1%.  The lowest oxygen saturation during sleep was 70.0%.  Time spent ?88% oxygen saturation was 3.6 minutes (0.8%).    Limb Activity  There were 54 limb movements recorded.  Of this total, 54 were classified as PLMs.  Of the PLMs, - were associated with arousals.  The Limb Movement index was 8.3 per hour while the PLM index was 8.3 per hour and PLM with arousals " "index was - per hour.    Cardiac: single lead EKG revealed normal sinus rhythm    Oxygenation:  Hypoxemia was only observed in relation to obstructive events.    Impression:    -obstructive sleep apnea (markedly positional with supine AHI =  24 vs non-supine AHI = 1)    Recommendations:    -treatment for the mild CATERINA seen in this study could be considered  -given the positionality of the sleep-disordered breathing in this study, positional therapy with an FDA approved positional device would be a reasonable first-line treatment option.  -the patient has follow up with Sleep Medicine        Harman Vazquez MD    (This Sleep Study was interpreted by a Board Certified Sleep Specialist who conducted an epoch-by-epoch review of the entire raw data recording.)  (The indication for this sleep study was reviewed and deemed appropriate by AAS Practice Parameters or other reasons by a Board Certified Sleep Specialist.)    [facility]    Diagnostic PSG Report    Patient Name: Brandi Manuel Study Date: 12/17/2022   YOB: 1945 MRN #: [id number]   Age: 77 year JODIE #: [id number]   Sex: Female Referring Provider: -   Height: 5' 2" Recording Tech: Willam Quinn RPSGT   Weight: 149.0 lbs Scoring Tech: Wellington Gao RRT RPSGT   BMI: 27.4 Interpreting Physician: Dania Basurto MD   ESS: - Neck Circumference: -     Study Overview    Lights Off: 09:38:15 PM  Count Index   Lights On: 05:20:42 AM Awakenings: 42 6.5   Time in Bed: 462.4 min. Arousals: 119 18.4   Total Sleep Time: 389.0 min. Apneas & Hypopneas: 59 9.1    Sleep Efficiency: 84.1% Limb Movements: 54 8.3   Sleep Latency: 6.1 min. Snores: - -   Wake After Sleep Onset: 67.5 min. Desaturations: 57 8.8    REM Latency from Sleep Onset: 195.5 min. Minimum SpO2 TST: 70.0%        Sleep Architecture   % of Time in Bed  Stages Time (mins) % Sleep Time   Wake 74.0    Stage N1 66.5 17.1%   Stage N2 263.0 67.6%   Stage N3 0.0 0.0%   REM 59.5 15.3% "         Arousal Summary     NREM REM Sleep Index   Respiratory Arousals 59 5 64 9.9   PLM Arousals - - - -   Isolated Limb Movement Arousals - - - -   Spontaneous Arousals 52 3 55 8.5   Total 111 8 119 18.4       Limb Movement Summary     Count Index   Isolated Limb Movements - -   Periodic Limb Movements (PLMs) 54 8.3   Total Limb Movements 54 8.3         Respiratory Summary     By Sleep Stage By Body Position Total    NREM REM Supine Non-Supine    Time (min) 329.5 59.5 136.5 252.5 389.0           Obstructive Apnea 1 9 10 - 10   Mixed Apnea - - - - -   Central Apnea - - - - -   Total Apneas 1 9 10 - 10   Total Apnea Index 0.2 9.1 4.4 - 1.5           Hypopnea 47 2 46 3 49   Hypopnea Index 8.6 2.0 20.2 0.7 7.6           Apnea & Hypopnea 48 11 56 3 59   Apnea & Hypopnea Index 8.7 11.1 24.6 0.7 9.1           RERAs 12 - 10 2 12   RERA Index 2.2 - 4.4 0.5 1.9           RDI 10.9 11.1 29.0 1.2 11.0     Scoring Criteria: Hypopneas scored at 4% desaturation criteria.    Respiratory Event Durations     Apnea Hypopnea    NREM REM NREM REM   Average (seconds) 11.7 25.3 20.1 19.4   Maximum (seconds) 11.7 38.9 37.8 19.6       Oxygen Saturation Summary     Wake NREM REM TST TIB   Average SpO2 95.7% 96.3% 95.4% 96.2% 96.1%   Minimum SpO2 70.0% 86.0% 70.0% 70.0% 70.0%   Maximum SpO2 99.0% 98.0% 98.0% 98.0% 99.0%       Oxygen Saturation Distribution    Range (%) Time in range (min) Time in range (%)   90.0 - 100.0 453.9 98.3%   80.0 - 90.0 6.8 1.5%   70.0 - 80.0 1.0 0.2%   60.0 - 70.0 0.1 0.0%   50.0 - 60.0 - -   0.0 - 50.0 - -   Time Spent ?88% SpO2    Range (%) Time in range (min) Time in range (%)   0.0 - 88.0 3.6 0.8%          Count Index   Desaturations 57 8.8      Cardiac Summary     Wake NREM REM Sleep Total   Average Pulse Rate (BPM) 62.0 57.9 57.7 57.9 58.6   Minimum Pulse Rate (BPM) 54.0 51.0 53.0 51.0 51.0   Maximum Pulse Rate (BPM) 91.0 71.0 93.0 93.0 93.0     Pulse Rate Distribution    Range (bpm) Time in range (min)  Time in range (%)   0.0 - 40.0 - -   40.0 - 60.0 378.5 81.9%   60.0 - 80.0 82.9 17.9%   80.0 - 100.0 0.9 0.2%   100.0 - 120.0 - -   120.0 - 140.0 - -   140.0 - 200.0 - -     EtCO2 Summary    Stage Min (mmHg) Average (mmHg) Max (mmHg)   Wake - - -   NREM(1+2+3) - - -   REM - - -     Range (mmHg) Time in range (min) Time in range (%)   - - -   - - -   - - -   - - -   - - -     TcCO2 Summary    Stage Min (mmHg) Average (mmHg) Max (mmHg)   Wake - - -   NREM(1+2+3) - - -   REM - - -     Range (mmHg) Time in range (min) Time in range (%)   20.0 - 40.0 - -   40.0 - 50.0 - -   50.0 - 55.0 - -   55.0 - 100.0 - -   Excluded data <20.0 & >65.0 463.0 100.0%     Comments    -

## 2023-01-05 ENCOUNTER — CLINICAL SUPPORT (OUTPATIENT)
Dept: REHABILITATION | Facility: HOSPITAL | Age: 78
End: 2023-01-05
Payer: MEDICARE

## 2023-01-05 DIAGNOSIS — M54.50 CHRONIC BILATERAL LOW BACK PAIN WITHOUT SCIATICA: Primary | ICD-10-CM

## 2023-01-05 DIAGNOSIS — R29.898 WEAKNESS OF BOTH LOWER EXTREMITIES: ICD-10-CM

## 2023-01-05 DIAGNOSIS — G89.29 CHRONIC BILATERAL LOW BACK PAIN WITHOUT SCIATICA: Primary | ICD-10-CM

## 2023-01-05 PROCEDURE — 97112 NEUROMUSCULAR REEDUCATION: CPT | Mod: PO

## 2023-01-05 PROCEDURE — 97110 THERAPEUTIC EXERCISES: CPT | Mod: PO

## 2023-01-05 NOTE — PROGRESS NOTES
[facility]   Physical Therapy Treatment Note     Name: Brandi Manuel  Clinic Number: [id number]    Therapy Diagnosis:   Encounter Diagnoses   Name Primary?    Chronic bilateral low back pain without sciatica Yes    Weakness of both lower extremities      Physician: Abad Anthony, *    Visit Date: 1/5/2023    Physician Orders: PT Eval and Treat   Medical Diagnosis from Referral: Z98.1 (ICD-10-CM) - S/P spinal fusion  Evaluation Date: 12/20/2022  Authorization Period Expiration: 12/31/2022  Plan of Care Expiration: 2/28/2023   Progress Note Due: 1/20/2023  Visit # / Visits authorized: 1/ pending   FOTO: 1/TBD  PTA Visit #: 0/5     Time In: 8:00AM  Time Out: 8:30AM  Total Billable Time: 30 minutes    SUBJECTIVE     Pt reports: her back is doing well. She did well over the holiday with intense baking. She still feels very good about the plan to develop an home exercise program and discharge in 4-5 visits.   She was compliant with home exercise program.  Response to previous treatment: None  Functional change: None    Pain: 0/10  Location: bilateral back      OBJECTIVE     Objective Measures updated at progress report unless specified.     Treatment     Brandi received the treatments listed below:      therapeutic exercises to develop strength, endurance, ROM, flexibility, posture, and core stabilization for 20 minutes including:  Recumbent Bike 5 minutes Level 4   Lower Trunk Rotation 3x10  Open Book with Lumbar Extension x15 B  Supine Active Straight Leg Raise -  3 sets - 10 reps  Sidelying Hip Abduction - 3 sets - 10 reps  Supine Bridge - 3 sets - 10 reps  Clamshell RTB 3x10  Sit to stand 2x10    neuromuscular re-education activities to improve: Balance, Coordination, Sense, Proprioception, and Posture for 8 minutes. The following activities were included:  Posterior Pelvic Tilt [id number]  Posterior Pelvic Tilt with Alternating March 3x10    therapeutic activities to improve functional  performance for 0  minutes, including:  None      Patient Education and Home Exercises     Home Exercises Provided and Patient Education Provided     Education provided:   - home exercise program     Written Home Exercises Provided: yes. Exercises were reviewed and Brandi was able to demonstrate them prior to the end of the session.  Brandi demonstrated good  understanding of the education provided. See EMR under Patient Instructions for exercises provided during therapy sessions    ASSESSMENT     Established home exercise program for home management of condition. Patient verbalizes understanding and that she has preformed all of these exercises prior to starting therapy here. Encouraged her to preform program several time before next apt and to return with any questions or concerns. Patient may be appropriate for discharged next visit.     Brandi Is progressing well towards her goals.   Pt prognosis is Good.     Pt will continue to benefit from skilled outpatient physical therapy to address the deficits listed in the problem list box on initial evaluation, provide pt/family education and to maximize pt's level of independence in the home and community environment.     Pt's spiritual, cultural and educational needs considered and pt agreeable to plan of care and goals.     Anticipated Barriers for therapy: scheduling, chronicity of condition, primary caregiver     Goals:   Short Term Goals: 4 weeks   1. Patient demonstrates independence with HEP.   2. Patient demonstrates independence with Postural Awareness.   3. Patient will preform lumbar ROM pain free.      Long Term Goals: 8 weeks   1. Patient demonstrates advanced home exercise program for home management of condition.  2. Patient demonstrates increased strength BLE's to 4+/5 or greater to improve tolerance to functional activities.   3. Patient demonstrates improved overall function per Oswestry to 45% or less.     PLAN     Plan of care Certification:  12/20/2022 to 2/28/2023 .     Outpatient Physical Therapy 1 times weekly for 10 weeks to include the following interventions: Aquatic Therapy, Electrical Stimulation  , Gait Training, Manual Therapy, Moist Heat/ Ice, Neuromuscular Re-ed, Patient Education, Self Care, Therapeutic Activities, and Therapeutic Exercise.      Edna Aldrich, PT     Pt may be seen by PTA as part of the rehabilitation team.     Edna Aldrich, PT

## 2023-01-09 ENCOUNTER — CLINICAL SUPPORT (OUTPATIENT)
Dept: REHABILITATION | Facility: HOSPITAL | Age: 78
End: 2023-01-09
Payer: MEDICARE

## 2023-01-09 DIAGNOSIS — R29.898 WEAKNESS OF BOTH LOWER EXTREMITIES: ICD-10-CM

## 2023-01-09 DIAGNOSIS — M54.50 CHRONIC BILATERAL LOW BACK PAIN WITHOUT SCIATICA: Primary | ICD-10-CM

## 2023-01-09 DIAGNOSIS — G89.29 CHRONIC BILATERAL LOW BACK PAIN WITHOUT SCIATICA: Primary | ICD-10-CM

## 2023-01-09 PROCEDURE — 97110 THERAPEUTIC EXERCISES: CPT | Mod: PO

## 2023-01-09 PROCEDURE — 97112 NEUROMUSCULAR REEDUCATION: CPT | Mod: PO

## 2023-01-09 NOTE — PROGRESS NOTES
[facility]   Physical Therapy Treatment Note     Name: Brandi Manuel  Clinic Number: [id number]    Therapy Diagnosis:   Encounter Diagnoses   Name Primary?    Chronic bilateral low back pain without sciatica Yes    Weakness of both lower extremities      Physician: Abad Anthony, *    Visit Date: 1/9/2023    Physician Orders: PT Eval and Treat   Medical Diagnosis from Referral: Z98.1 (ICD-10-CM) - S/P spinal fusion  Evaluation Date: 12/20/2022  Authorization Period Expiration: 12/31/2022  Plan of Care Expiration: 2/28/2023   Progress Note Due: 1/20/2023  Visit # / Visits authorized: 2/20  FOTO: 1/TBD  PTA Visit #: 0/5     Time In: 8:15AM  Time Out: 8:50 AM  Total Billable Time: 25 minutes    SUBJECTIVE     Pt reports: her back is hurting this morning - but she wants to exercise and get moving.  She was compliant with home exercise program.  Response to previous treatment: None  Functional change: None    Pain: 4/10  Location: bilateral back      OBJECTIVE     Objective Measures updated at progress report unless specified.     Treatment     Brandi received the treatments listed below:      therapeutic exercises to develop strength, endurance, ROM, flexibility, posture, and core stabilization for 25 minutes including:  Recumbent Bike 5 minutes Level 4   Lower Trunk Rotation 3x10  Open Book with Lumbar Extension x15 B  Supine Active Straight Leg Raise -  3 sets - 10 reps  Sidelying Hip Abduction - 3 sets - 10 reps  Supine Bridge - 3 sets - 10 reps  Clamshell RTB 3x10  Sit to stand 2x10  Rows RTB 3x10  Shoulder Extensions RTB 3x10  Paloff Press RTB x20    neuromuscular re-education activities to improve: Balance, Coordination, Sense, Proprioception, and Posture for 8 minutes. The following activities were included:  Posterior Pelvic Tilt [id number]  Posterior Pelvic Tilt with Alternating March 3x10    therapeutic activities to improve functional performance for 0  minutes,  including:  None      Patient Education and Home Exercises     Home Exercises Provided and Patient Education Provided     Education provided:   - home exercise program     Written Home Exercises Provided: yes. Exercises were reviewed and Brandi was able to demonstrate them prior to the end of the session.  Brandi demonstrated good  understanding of the education provided. See EMR under Patient Instructions for exercises provided during therapy sessions    ASSESSMENT   Patient very quick to complete program - despite cueing for control and pacing. IF patient demonstrates independence with home exercise program patient appropriate for d/c.     Brandi Is progressing well towards her goals.   Pt prognosis is Good.     Pt will continue to benefit from skilled outpatient physical therapy to address the deficits listed in the problem list box on initial evaluation, provide pt/family education and to maximize pt's level of independence in the home and community environment.     Pt's spiritual, cultural and educational needs considered and pt agreeable to plan of care and goals.     Anticipated Barriers for therapy: scheduling, chronicity of condition, primary caregiver     Goals:   Short Term Goals: 4 weeks   1. Patient demonstrates independence with HEP.   2. Patient demonstrates independence with Postural Awareness.   3. Patient will preform lumbar ROM pain free.      Long Term Goals: 8 weeks   1. Patient demonstrates advanced home exercise program for home management of condition.  2. Patient demonstrates increased strength BLE's to 4+/5 or greater to improve tolerance to functional activities.   3. Patient demonstrates improved overall function per Oswestry to 45% or less.     PLAN     Plan of care Certification: 12/20/2022 to 2/28/2023 .     Outpatient Physical Therapy 1 times weekly for 10 weeks to include the following interventions: Aquatic Therapy, Electrical Stimulation  , Gait Training, Manual Therapy, Moist  Heat/ Ice, Neuromuscular Re-ed, Patient Education, Self Care, Therapeutic Activities, and Therapeutic Exercise.      Edna Aldrich, PT     Pt may be seen by PTA as part of the rehabilitation team.     Edna Aldrich, PT

## 2023-01-17 ENCOUNTER — NURSE TRIAGE (OUTPATIENT)
Dept: ADMINISTRATIVE | Facility: CLINIC | Age: 78
End: 2023-01-17
Payer: MEDICARE

## 2023-01-17 ENCOUNTER — TELEPHONE (OUTPATIENT)
Dept: PRIMARY CARE CLINIC | Facility: CLINIC | Age: 78
End: 2023-01-17
Payer: MEDICARE

## 2023-01-17 NOTE — TELEPHONE ENCOUNTER
Patient is calling because she would like to receive medical treatment for related to at home COVID + test on today. Symptoms beginning yesterday. Patient would also like to advise to please send to her local pharmacy not mail order please.   Patient acknowledges she missed a call back on today. She is just concerned because of her age etc.      Reason for Disposition   [1] HIGH RISK for severe COVID complications (e.g., weak immune system, age > 64 years, obesity with BMI 30 or higher, pregnant, chronic lung disease or other chronic medical condition) AND [2] COVID symptoms (e.g., cough, fever)  (Exceptions: Already seen by PCP and no new or worsening symptoms.)    Additional Information   Negative: SEVERE difficulty breathing (e.g., struggling for each breath, speaks in single words)   Negative: Difficult to awaken or acting confused (e.g., disoriented, slurred speech)   Negative: Bluish (or gray) lips or face now   Negative: Shock suspected (e.g., cold/pale/clammy skin, too weak to stand, low BP, rapid pulse)   Negative: Sounds like a life-threatening emergency to the triager   Negative: [1] Diagnosed or suspected COVID-19 AND [2] symptoms lasting 3 or more weeks   Negative: [1] COVID-19 exposure AND [2] no symptoms   Negative: COVID-19 vaccine reaction suspected (e.g., fever, headache, muscle aches) occurring 1 to 3 days after getting vaccine   Negative: COVID-19 vaccine, questions about   Negative: [1] Lives with someone known to have influenza (flu test positive) AND [2] flu-like symptoms (e.g., cough, runny nose, sore throat, SOB; with or without fever)   Negative: COVID-19 and breastfeeding, questions about   Negative: [1] Adult with possible COVID-19 symptoms AND [2] triager concerned about severity of symptoms or other causes   Negative: SEVERE or constant chest pain or pressure  (Exception: Mild central chest pain, present only when coughing.)   Negative: MODERATE difficulty breathing (e.g., speaks in  phrases, SOB even at rest, pulse 100-120)   Negative: [1] Headache AND [2] stiff neck (can't touch chin to chest)   Negative: Oxygen level (e.g., pulse oximetry) 90 percent or lower     99%   Negative: Chest pain or pressure  (Exception: MILD central chest pain, present only when coughing)   Negative: Patient sounds very sick or weak to the triager   Negative: MILD difficulty breathing (e.g., minimal/no SOB at rest, SOB with walking, pulse <100)   Negative: Fever > 103 F (39.4 C)   Negative: [1] Fever > 101 F (38.3 C) AND [2] age > 60 years   Negative: [1] Fever > 100.0 F (37.8 C) AND [2] bedridden (e.g., CVA, chronic illness, recovering from surgery)   Negative: Oxygen level (e.g., pulse oximetry) 91 to 94 percent    Protocols used: Coronavirus (COVID-19) Diagnosed or [id number]

## 2023-01-17 NOTE — TELEPHONE ENCOUNTER
----- Message from Ragini Deras sent at 1/17/2023 11:04 AM CST -----  Contact: [phone]  Pt states she called early this morning in regards to having covid and where to send the medication please give return call she states she called at 7

## 2023-01-18 ENCOUNTER — TELEPHONE (OUTPATIENT)
Dept: PRIMARY CARE CLINIC | Facility: CLINIC | Age: 78
End: 2023-01-18
Payer: MEDICARE

## 2023-01-18 ENCOUNTER — TELEPHONE (OUTPATIENT)
Dept: PRIMARY CARE CLINIC | Facility: CLINIC | Age: 78
End: 2023-01-18

## 2023-01-18 DIAGNOSIS — U07.1 COVID-19 VIRUS INFECTION: Primary | ICD-10-CM

## 2023-01-18 RX ORDER — BENZONATATE 100 MG/1
100 CAPSULE ORAL 3 TIMES DAILY PRN
Qty: 30 CAPSULE | Refills: 0 | Status: SHIPPED | OUTPATIENT
Start: 2023-01-18 | End: 2023-02-17

## 2023-01-18 NOTE — TELEPHONE ENCOUNTER
Called pt to inform virtual visit scheduled today at 4:40 pm with Dr. Fischer no answer, LVM with info

## 2023-01-24 ENCOUNTER — CLINICAL SUPPORT (OUTPATIENT)
Dept: REHABILITATION | Facility: HOSPITAL | Age: 78
End: 2023-01-24
Payer: MEDICARE

## 2023-01-24 DIAGNOSIS — M54.50 CHRONIC BILATERAL LOW BACK PAIN WITHOUT SCIATICA: Primary | ICD-10-CM

## 2023-01-24 DIAGNOSIS — R29.898 WEAKNESS OF BOTH LOWER EXTREMITIES: ICD-10-CM

## 2023-01-24 DIAGNOSIS — G89.29 CHRONIC BILATERAL LOW BACK PAIN WITHOUT SCIATICA: Primary | ICD-10-CM

## 2023-01-24 PROCEDURE — 97110 THERAPEUTIC EXERCISES: CPT | Mod: PO

## 2023-01-24 PROCEDURE — 97112 NEUROMUSCULAR REEDUCATION: CPT | Mod: PO

## 2023-01-24 NOTE — PROGRESS NOTES
[facility]   Physical Therapy Treatment and Progress Note     Name: Brandi Manuel  Clinic Number: [id number]    Therapy Diagnosis:   Encounter Diagnoses   Name Primary?    Chronic bilateral low back pain without sciatica Yes    Weakness of both lower extremities      Physician: Abad Anthony, *    Visit Date: 1/24/2023    Physician Orders: PT Eval and Treat   Medical Diagnosis from Referral: Z98.1 (ICD-10-CM) - S/P spinal fusion  Evaluation Date: 12/20/2022  Authorization Period Expiration: 12/31/2022  Plan of Care Expiration: 2/28/2023   Progress Note Due: 2/24/2023  Visit # / Visits authorized: 3/20  FOTO: 1/TBD  PTA Visit #: 0/5     Time In: 8:45AM  Time Out: 9:23 AM  Total Billable Time: 38 minutes    SUBJECTIVE     Pt reports: her back is hurting this morning - but she wants to exercise and get moving.  She was compliant with home exercise program.  Response to previous treatment: None  Functional change: None    Pain: 2/10  Location: bilateral back      OBJECTIVE     Objective Measures updated at progress report unless specified.   MOVEMENT EXAMINATION   Flexion 75% mid shin - tightness    Repeated Flexion Not Tested    Extension 75%   Repeated Extension Not Tested    Right Side Bending 25%    Left Side Bending 25%    Right Rotation 50% - tightness   Left Rotation 50% - tightness    Right Quadrant Not Tested    Left Quadrant Not Tested      Treatment     Brandi received the treatments listed below:      therapeutic exercises to develop strength, endurance, ROM, flexibility, posture, and core stabilization for 25 minutes including:  Recumbent Bike 8 minutes Level 4   Lower Trunk Rotation 3x10  Open Book with Lumbar Extension x15 B  Supine Active Straight Leg Raise -  3 sets - 10 reps 1#  Sidelying Hip Abduction - 3 sets - 10 reps 1#  Supine Bridge - 3 sets - 10 reps  Clamshell RTB 3x10  Sit to stand 2x10  Rows RTB 3x10  Shoulder Extensions RTB 3x10  Paloff Press RTB  x20    neuromuscular re-education activities to improve: Balance, Coordination, Sense, Proprioception, and Posture for 13 minutes. The following activities were included:  Posterior Pelvic Tilt [id number]  Posterior Pelvic Tilt with Alternating March 3x10    therapeutic activities to improve functional performance for 0  minutes, including:  None      Patient Education and Home Exercises     Home Exercises Provided and Patient Education Provided     Education provided:   - home exercise program     Written Home Exercises Provided: yes. Exercises were reviewed and Brandi was able to demonstrate them prior to the end of the session.  Brandi demonstrated good  understanding of the education provided. See EMR under Patient Instructions for exercises provided during therapy sessions    ASSESSMENT   Patient demonstrates improve lumbar spine AROM - she continues to report stiffness with movement - however this is attributed to multi-level spinal fusions. Patient would continue to benefit from 1-2 more sessions to continue to advance home exercise program for self management of condition.    Brandi Is progressing well towards her goals.   Pt prognosis is Good.     Pt will continue to benefit from skilled outpatient physical therapy to address the deficits listed in the problem list box on initial evaluation, provide pt/family education and to maximize pt's level of independence in the home and community environment.     Pt's spiritual, cultural and educational needs considered and pt agreeable to plan of care and goals.     Anticipated Barriers for therapy: scheduling, chronicity of condition, primary caregiver     Goals:   Short Term Goals: 4 weeks   1. Patient demonstrates independence with HEP. - goal progressing 1/24/2023  2. Patient demonstrates independence with Postural Awareness. - goal progressing 1/24/2023  3. Patient will preform lumbar ROM pain free. - goal progressing 1/24/2023     Long Term Goals: 8 weeks   1.  Patient demonstrates advanced home exercise program for home management of condition. - goal progressing 1/24/2023  2. Patient demonstrates increased strength BLE's to 4+/5 or greater to improve tolerance to functional activities. - goal progressing 1/24/2023  3. Patient demonstrates improved overall function per Oswestry to 45% or less. - goal progressing 1/24/2023    PLAN     Plan of care Certification: 12/20/2022 to 2/28/2023 .     Outpatient Physical Therapy 1 times weekly for 10 weeks to include the following interventions: Aquatic Therapy, Electrical Stimulation  , Gait Training, Manual Therapy, Moist Heat/ Ice, Neuromuscular Re-ed, Patient Education, Self Care, Therapeutic Activities, and Therapeutic Exercise.      Edna Aldrich, PT     Pt may be seen by PTA as part of the rehabilitation team.     Edna Aldrich, PT

## 2023-01-26 ENCOUNTER — OFFICE VISIT (OUTPATIENT)
Dept: DERMATOLOGY | Facility: CLINIC | Age: 78
End: 2023-01-26
Payer: MEDICARE

## 2023-01-26 DIAGNOSIS — L81.4 SKIN SPOTS-AGING: ICD-10-CM

## 2023-01-26 DIAGNOSIS — L81.5 HYPOMELANOSIS: ICD-10-CM

## 2023-01-26 DIAGNOSIS — Z41.1 ENCOUNTER FOR COSMETIC PROCEDURE: ICD-10-CM

## 2023-01-26 DIAGNOSIS — L82.1 SK (SEBORRHEIC KERATOSIS): Primary | ICD-10-CM

## 2023-01-26 PROCEDURE — 99213 OFFICE O/P EST LOW 20 MIN: CPT | Mod: PBBFAC,PN | Performed by: DERMATOLOGY

## 2023-01-26 PROCEDURE — 99204 OFFICE O/P NEW MOD 45 MIN: CPT | Mod: S$PBB,,, | Performed by: DERMATOLOGY

## 2023-01-26 PROCEDURE — 99999 PR PBB SHADOW E&M-EST. PATIENT-LVL III: ICD-10-PCS | Mod: PBBFAC,,, | Performed by: DERMATOLOGY

## 2023-01-26 PROCEDURE — 99999 PR PBB SHADOW E&M-EST. PATIENT-LVL III: CPT | Mod: PBBFAC,,, | Performed by: DERMATOLOGY

## 2023-01-26 PROCEDURE — 99204 PR OFFICE/OUTPT VISIT, NEW, LEVL IV, 45-59 MIN: ICD-10-PCS | Mod: S$PBB,,, | Performed by: DERMATOLOGY

## 2023-01-26 RX ORDER — BIMATOPROST 3 UG/ML
SOLUTION TOPICAL
Qty: 5 ML | Refills: 4 | Status: SHIPPED | OUTPATIENT
Start: 2023-01-26 | End: 2023-12-18 | Stop reason: SDUPTHER

## 2023-01-26 NOTE — PROGRESS NOTES
Subjective:       Patient ID:  Brandi Manuel is a 77 y.o. female who presents for   Chief Complaint   Patient presents with    Spot     Hands and legs       Spot - Initial  Affected locations: right hand, left hand, right upper leg and left upper leg  Severity: mild to moderate    Review of Systems   Constitutional: Negative.    HENT: Negative.     Respiratory: Negative.     Musculoskeletal: Negative.       Objective:    Physical Exam   Constitutional: She appears well-developed and well-nourished.   Eyes: No conjunctival no injection.   Cardiovascular:  There is no dependent edema.             Neurological: She is alert and oriented to person, place, and time.   Psychiatric: She has a normal mood and affect.   Skin:               Diagram Legend     Erythematous scaling macule/papule c/w actinic keratosis       Vascular papule c/w angioma      Pigmented verrucoid papule/plaque c/w seborrheic keratosis      Yellow umbilicated papule c/w sebaceous hyperplasia      Irregularly shaped tan macule c/w lentigo     1-2 mm smooth white papules consistent with Milia      Movable subcutaneous cyst with punctum c/w epidermal inclusion cyst      Subcutaneous movable cyst c/w pilar cyst      Firm pink to brown papule c/w dermatofibroma      Pedunculated fleshy papule(s) c/w skin tag(s)      Evenly pigmented macule c/w junctional nevus     Mildly variegated pigmented, slightly irregular-bordered macule c/w mildly atypical nevus      Flesh colored to evenly pigmented papule c/w intradermal nevus       Pink pearly papule/plaque c/w basal cell carcinoma      Erythematous hyperkeratotic cursted plaque c/w SCC      Surgical scar with no sign of skin cancer recurrence      Open and closed comedones      Inflammatory papules and pustules      Verrucoid papule consistent consistent with wart     Erythematous eczematous patches and plaques     Dystrophic onycholytic nail with subungual debris c/w onychomycosis     Umbilicated papule     Erythematous-base heme-crusted tan verrucoid plaque consistent with inflamed seborrheic keratosis     Erythematous Silvery Scaling Plaque c/w Psoriasis     See annotation      Assessment / Plan:        SK (seborrheic keratosis)  Discussed with patient the benign nature of these lesions and that no treatment is indicated.  Brochure given for patient education.  Chronic nature of this condition discussed with patient.  Acosta garcia.    Skin spots-aging  -     Ambulatory referral/consult to Dermatology  Previous [facility] labs and or records and notes reviewed and considered for their impact on our clinical decision making today.    Hypomelanosis  Discussed with patient the benign nature of these lesions and that no treatment is indicated.  Chronic nature of this condition discussed with patient.    Encounter for cosmetic procedure  -     bimatoprost (LATISSE) 0.03 % ophthalmic solution; Place one drop on applicator and apply evenly along the skin of the upper eyelid at base of eyelashes once daily at bedtime; repeat procedure for second eye (use a clean applicator).  Dispense: 5 mL; Refill: 4  Pt wants latisse again.  Reports using in the past.  Discussed side affects of latisse including: Dry eyes, Feeling that something is in the eye, Burning, eyelash changes like dark eyelashes, along with darkening of eyelid skin and possible eye color   Discussed with patient the problem of various physicians portraying themselves as dermatologists when they are not actually boarded in dermatology, which then calls into question any previously provided care.           Follow up in about 1 year (around 1/26/2024), or if symptoms worsen or fail to improve.

## 2023-01-30 ENCOUNTER — TELEPHONE (OUTPATIENT)
Dept: PRIMARY CARE CLINIC | Facility: CLINIC | Age: 78
End: 2023-01-30
Payer: MEDICARE

## 2023-01-30 NOTE — TELEPHONE ENCOUNTER
----- Message from Lynda Matson MA sent at 1/17/2023  4:28 PM CST -----  Contact: [phone]    ----- Message -----  From: Moira Edward  Sent: 1/17/2023   4:05 PM CST  To: Kandi Leija Staff    Pt tested positive for Covid this morning. She would like a cough medicine called in and anti viral. Can you please call her back or call in the medication for her? Also, she would like to go to [facility] to get a covid infusion, her last one was over a year ago.     [id number] - [address] AT [address] [address]  Phone: [phone] Fax: [phone]

## 2023-01-30 NOTE — TELEPHONE ENCOUNTER
Can you call patient and ask how she was doing?  At this time, the new recommendations for patients with COVID-19 is the oral medication Paxlovid and not the infusion.  However, she is out of the window for Paxlovid.

## 2023-01-31 ENCOUNTER — CLINICAL SUPPORT (OUTPATIENT)
Dept: REHABILITATION | Facility: HOSPITAL | Age: 78
End: 2023-01-31
Payer: MEDICARE

## 2023-01-31 DIAGNOSIS — R29.898 WEAKNESS OF BOTH LOWER EXTREMITIES: ICD-10-CM

## 2023-01-31 DIAGNOSIS — M54.50 CHRONIC BILATERAL LOW BACK PAIN WITHOUT SCIATICA: Primary | ICD-10-CM

## 2023-01-31 DIAGNOSIS — G89.29 CHRONIC BILATERAL LOW BACK PAIN WITHOUT SCIATICA: Primary | ICD-10-CM

## 2023-01-31 PROCEDURE — 97112 NEUROMUSCULAR REEDUCATION: CPT | Mod: PO

## 2023-01-31 PROCEDURE — 97110 THERAPEUTIC EXERCISES: CPT | Mod: PO

## 2023-01-31 NOTE — PROGRESS NOTES
[facility]   Physical Therapy Treatment Note     Name: Brandi Manuel  Clinic Number: [id number]    Therapy Diagnosis:   Encounter Diagnoses   Name Primary?    Chronic bilateral low back pain without sciatica Yes    Weakness of both lower extremities      Physician: Abad Anthony, *    Visit Date: 1/31/2023    Physician Orders: PT Eval and Treat   Medical Diagnosis from Referral: Z98.1 (ICD-10-CM) - S/P spinal fusion  Evaluation Date: 12/20/2022  Authorization Period Expiration: 12/31/2022  Plan of Care Expiration: 2/28/2023   Progress Note Due: 2/24/2023  Visit # / Visits authorized: 3/20  FOTO: 1/TBD  PTA Visit #: 0/5     Time In: 8:45AM  Time Out: 9:23 AM  Total Billable Time: 38 minutes    SUBJECTIVE     Pt reports: no complaints.  She was compliant with home exercise program.  Response to previous treatment: None  Functional change: None    Pain: 2/10  Location: bilateral back      OBJECTIVE     Objective Measures updated at progress report unless specified.     Treatment     Brandi received the treatments listed below:      therapeutic exercises to develop strength, endurance, ROM, flexibility, posture, and core stabilization for 25 minutes including:  Recumbent Bike 8 minutes Level 4   Lower Trunk Rotation 3x10  Open Book with Lumbar Extension x15 B  Supine Active Straight Leg Raise -  3 sets - 10 reps 2#  Sidelying Hip Abduction - 3 sets - 10 reps 2#  Supine Bridge RTB - 3 sets - 10 reps  Clamshell RTB 3x10  Sit to stand 2x10 10#  Rows RTB 3x10  Shoulder Extensions RTB 3x10  Paloff Press RTB x20    neuromuscular re-education activities to improve: Balance, Coordination, Sense, Proprioception, and Posture for 13 minutes. The following activities were included:  Posterior Pelvic Tilt [id number]  Posterior Pelvic Tilt with Alternating March 3x10    therapeutic activities to improve functional performance for 0  minutes, including:  None      Patient Education and Home Exercises      Home Exercises Provided and Patient Education Provided     Education provided:   - home exercise program     Written Home Exercises Provided: yes. Exercises were reviewed and Brandi was able to demonstrate them prior to the end of the session.  Brandi demonstrated good  understanding of the education provided. See EMR under Patient Instructions for exercises provided during therapy sessions    ASSESSMENT   Increased fatigue noted with increased weight. Patient reports generalized back pain continues to come and go.    Brandi Is progressing well towards her goals.   Pt prognosis is Good.     Pt will continue to benefit from skilled outpatient physical therapy to address the deficits listed in the problem list box on initial evaluation, provide pt/family education and to maximize pt's level of independence in the home and community environment.     Pt's spiritual, cultural and educational needs considered and pt agreeable to plan of care and goals.     Anticipated Barriers for therapy: scheduling, chronicity of condition, primary caregiver     Goals:   Short Term Goals: 4 weeks   1. Patient demonstrates independence with HEP. - goal progressing 1/24/2023  2. Patient demonstrates independence with Postural Awareness. - goal progressing 1/24/2023  3. Patient will preform lumbar ROM pain free. - goal progressing 1/24/2023     Long Term Goals: 8 weeks   1. Patient demonstrates advanced home exercise program for home management of condition. - goal progressing 1/24/2023  2. Patient demonstrates increased strength BLE's to 4+/5 or greater to improve tolerance to functional activities. - goal progressing 1/24/2023  3. Patient demonstrates improved overall function per Oswestry to 45% or less. - goal progressing 1/24/2023    PLAN     Plan of care Certification: 12/20/2022 to 2/28/2023 .     Outpatient Physical Therapy 1 times weekly for 10 weeks to include the following interventions: Aquatic Therapy, Electrical  Stimulation  , Gait Training, Manual Therapy, Moist Heat/ Ice, Neuromuscular Re-ed, Patient Education, Self Care, Therapeutic Activities, and Therapeutic Exercise.      Edna Aldrich, PT     Pt may be seen by PTA as part of the rehabilitation team.     Edna Aldrich, PT

## 2023-02-06 ENCOUNTER — OFFICE VISIT (OUTPATIENT)
Dept: PRIMARY CARE CLINIC | Facility: CLINIC | Age: 78
End: 2023-02-06
Payer: MEDICARE

## 2023-02-06 VITALS
HEART RATE: 62 BPM | BODY MASS INDEX: 27.94 KG/M2 | SYSTOLIC BLOOD PRESSURE: 156 MMHG | RESPIRATION RATE: 20 BRPM | HEIGHT: 62 IN | WEIGHT: 151.81 LBS | TEMPERATURE: 98 F | DIASTOLIC BLOOD PRESSURE: 70 MMHG

## 2023-02-06 DIAGNOSIS — R11.2 NAUSEA AND VOMITING, UNSPECIFIED VOMITING TYPE: Primary | ICD-10-CM

## 2023-02-06 DIAGNOSIS — R03.0 ELEVATED BLOOD PRESSURE READING WITHOUT DIAGNOSIS OF HYPERTENSION: ICD-10-CM

## 2023-02-06 PROCEDURE — 99999 PR PBB SHADOW E&M-EST. PATIENT-LVL IV: ICD-10-PCS | Mod: PBBFAC,,, | Performed by: FAMILY MEDICINE

## 2023-02-06 PROCEDURE — 99214 OFFICE O/P EST MOD 30 MIN: CPT | Mod: PBBFAC,PN | Performed by: FAMILY MEDICINE

## 2023-02-06 PROCEDURE — 99213 OFFICE O/P EST LOW 20 MIN: CPT | Mod: S$PBB,,, | Performed by: FAMILY MEDICINE

## 2023-02-06 PROCEDURE — 99999 PR PBB SHADOW E&M-EST. PATIENT-LVL IV: CPT | Mod: PBBFAC,,, | Performed by: FAMILY MEDICINE

## 2023-02-06 PROCEDURE — 99213 PR OFFICE/OUTPT VISIT, EST, LEVL III, 20-29 MIN: ICD-10-PCS | Mod: S$PBB,,, | Performed by: FAMILY MEDICINE

## 2023-02-06 RX ORDER — ONDANSETRON 4 MG/1
4 TABLET, ORALLY DISINTEGRATING ORAL EVERY 8 HOURS PRN
Qty: 20 TABLET | Refills: 0 | Status: SHIPPED | OUTPATIENT
Start: 2023-02-06

## 2023-02-06 NOTE — PROGRESS NOTES
Clinic Note  2/6/2023      Subjective:       Patient ID:  Brandi is a 77 y.o. female being seen for an established visit.    Chief Complaint: Nausea    Nausea vomiting-patient with a history of BPPV here for 3 days of headaches, nausea, vomiting, dizziness.  Patient said symptoms started 2 days ago with significant nausea and vomiting, tolerating some p.o. liquids.  Tried to take extra-strength Tylenol, tried to take meloxicam but patient threw it up.  Has been trying to drink Pedialyte.  Reports that dizziness is different from when she has vertigo.  Denies any diarrhea, constipation.  Patient had COVID-19 several weeks ago with sinus symptoms and mild cough.    High blood pressure without diagnosis-check blood pressure this morning which was 122/70      Review of Systems   Constitutional:  Negative for chills, fever, malaise/fatigue and weight loss.   HENT:  Negative for congestion, sinus pain and sore throat.    Respiratory:  Negative for cough, shortness of breath and wheezing.    Cardiovascular:  Negative for chest pain and palpitations.   Gastrointestinal:  Positive for nausea and vomiting. Negative for abdominal pain, blood in stool, constipation, diarrhea, heartburn and melena.   Genitourinary:  Negative for dysuria, frequency and urgency.   Musculoskeletal:  Negative for myalgias.   Skin:  Negative for rash.   Neurological:  Negative for headaches.     Medication List with Changes/Refills   New Medications    ONDANSETRON (ZOFRAN-ODT) 4 MG TBDL    Take 1 tablet (4 mg total) by mouth every 8 (eight) hours as needed (nausea / vomiting).   Current Medications    ACETAMINOPHEN (TYLENOL) 650 MG TBSR    Take 1,300 mg by mouth once daily.    BENZONATATE (TESSALON) 100 MG CAPSULE    Take 1 capsule (100 mg total) by mouth 3 (three) times daily as needed for Cough.    BIMATOPROST (LATISSE) 0.03 % OPHTHALMIC SOLUTION    Place one drop on applicator and apply evenly along the skin of the upper eyelid at base of  "eyelashes once daily at bedtime; repeat procedure for second eye (use a clean applicator).    DICLOFENAC SODIUM 2 % SOPK    Apply 40 mg topically 2 (two) times a day.    MECLIZINE (ANTIVERT) 25 MG TABLET    Take 1 tablet (25 mg total) by mouth 2 (two) times daily as needed for Dizziness.    MELOXICAM (MOBIC) 15 MG TABLET    Take 7.5 mg by mouth once daily.    MIRTAZAPINE (REMERON) 7.5 MG TAB    Take 7.5 mg by mouth every evening.    TRIAMCINOLONE ACETONIDE 0.1% (KENALOG) 0.1 % OINTMENT    Apply topically 3 (three) times daily.    ZOLPIDEM (AMBIEN CR) 12.5 MG CR TABLET    Take 12.5 mg by mouth.       Patient Active Problem List   Diagnosis    Benign paroxysmal vertigo, bilateral    Dermatitis    Generalized muscle weakness    Hearing loss    Hot flash, menopausal    Hyperlipidemia    IFG (impaired fasting glucose)    Insomnia    Chronic bilateral low back pain without sciatica    Lumbar stenosis with neurogenic claudication    Osteoarthritis    Other depressive disorder    Posttraumatic stress disorder    Pure hypercholesterolemia, unspecified    Right leg paresthesias    Sciatica    Spondylolisthesis at L4-L5 level    DDD (degenerative disc disease), lumbar    Lumbar radiculopathy    S/P lumbar fusion    Obesity (BMI 30-39.9)    Hot flash not due to menopause    Family history of colon cancer    Family history of breast cancer    COVID    Skin spots-aging    Sleep apnea    Weakness of both lower extremities           Objective:      BP (!) 156/70   Pulse 62   Temp 97.9 °F (36.6 °C) (Oral)   Resp 20   Ht 5' 2" (1.575 m)   Wt 68.9 kg (151 lb 12.6 oz)   BMI 27.76 kg/m²   Estimated body mass index is 27.76 kg/m² as calculated from the following:    Height as of this encounter: 5' 2" (1.575 m).    Weight as of this encounter: 68.9 kg (151 lb 12.6 oz).  Physical Exam  Vitals reviewed.   Constitutional:       General: She is not in acute distress.     Appearance: She is not diaphoretic.   HENT:      Head: " Normocephalic and atraumatic.      Mouth/Throat:      Mouth: Mucous membranes are dry.      Pharynx: No oropharyngeal exudate or posterior oropharyngeal erythema.   Eyes:      Conjunctiva/sclera: Conjunctivae normal.   Cardiovascular:      Rate and Rhythm: Normal rate and regular rhythm.      Heart sounds: Normal heart sounds.   Pulmonary:      Effort: Pulmonary effort is normal. No respiratory distress.      Breath sounds: Normal breath sounds. No wheezing.   Abdominal:      General: Bowel sounds are normal.      Palpations: Abdomen is soft.   Musculoskeletal:         General: Normal range of motion.      Cervical back: Normal range of motion.   Skin:     General: Skin is warm and dry.      Findings: No erythema or rash.   Neurological:      Mental Status: She is alert and oriented to person, place, and time.   Psychiatric:         Mood and Affect: Mood and affect normal.         Behavior: Behavior normal.         Thought Content: Thought content normal.         Judgment: Judgment normal.         Assessment and Plan:     1. Nausea and vomiting, unspecified vomiting type  - vital stable and no red flags on exam, ikely gastroenteritis.  Continue supportive therapy with hydration with Pedialyte or water or  diluted apple juice.  Zofran p.r.n..  Does not appear to be flare-up of BPPV  - ondansetron (ZOFRAN-ODT) 4 MG TbDL; Take 1 tablet (4 mg total) by mouth every 8 (eight) hours as needed (nausea / vomiting).  Dispense: 20 tablet; Refill: 0    2. Elevated blood pressure reading without diagnosis of hypertension  - blood pressure elevated today, however normal at home.  Patient to check blood pressures daily and [facility] starting next week when patient's illness has improved      Follow Up:   No follow-ups on file.    Other Orders Placed This Visit:  No orders of the defined types were placed in this encounter.        Hao Chau MD        This note is dictated on M*Modal word recognition  program.  There are word recognition mistakes that are occasionally missed on review.

## 2023-02-14 ENCOUNTER — OFFICE VISIT (OUTPATIENT)
Dept: OTOLARYNGOLOGY | Facility: CLINIC | Age: 78
End: 2023-02-14
Payer: MEDICARE

## 2023-02-14 VITALS
WEIGHT: 152.75 LBS | BODY MASS INDEX: 28.11 KG/M2 | DIASTOLIC BLOOD PRESSURE: 71 MMHG | HEIGHT: 62 IN | HEART RATE: 58 BPM | SYSTOLIC BLOOD PRESSURE: 175 MMHG

## 2023-02-14 DIAGNOSIS — J34.0 NASAL ULCER: ICD-10-CM

## 2023-02-14 DIAGNOSIS — H81.399 PERIPHERAL VERTIGO, UNSPECIFIED LATERALITY: Primary | ICD-10-CM

## 2023-02-14 DIAGNOSIS — G43.109 MIGRAINOUS VERTIGO: ICD-10-CM

## 2023-02-14 PROCEDURE — 99999 PR PBB SHADOW E&M-EST. PATIENT-LVL III: ICD-10-PCS | Mod: PBBFAC,,, | Performed by: OTOLARYNGOLOGY

## 2023-02-14 PROCEDURE — 99213 OFFICE O/P EST LOW 20 MIN: CPT | Mod: S$PBB,,, | Performed by: OTOLARYNGOLOGY

## 2023-02-14 PROCEDURE — 99213 PR OFFICE/OUTPT VISIT, EST, LEVL III, 20-29 MIN: ICD-10-PCS | Mod: S$PBB,,, | Performed by: OTOLARYNGOLOGY

## 2023-02-14 PROCEDURE — 99213 OFFICE O/P EST LOW 20 MIN: CPT | Mod: PBBFAC,PN | Performed by: OTOLARYNGOLOGY

## 2023-02-14 PROCEDURE — 99999 PR PBB SHADOW E&M-EST. PATIENT-LVL III: CPT | Mod: PBBFAC,,, | Performed by: OTOLARYNGOLOGY

## 2023-02-14 NOTE — PROGRESS NOTES
"  [facility]    Subjective:      Patient: Brandi Manuel Patient PCP: Hao Chau MD         :  1945     Sex:  female      MRN:  [id number]          Date of Visit: 2023      Chief Complaint: Follow-up (3 month follow up on vertigo. States did have an episode last week. PCP thought may have had a GI bug as pt had nausea and vomiting associated with the dizziness. Finished Vestibular Therapy. Is getting a CPAP machine soon. )    2023 three-month follow-up visit: Brandi Manuel is a 77 y.o. female lifelong NON-smoker with A history of HLD, PTSD, lumbar radiculopathy status post lumbar fusion with sciatica seen for episodic dizziness and vertigo with nonspecific presentation both consistent with BPPV treated and resolved versus, migraine associated dizziness, or some element of panic attack no particular classic findings consistent with Meniere's disease repeat three-month audiogram and VNG recommended.    VNG completed 2022 reports "evidence of an incompletely compensated, non-localizing vestibular abnormality accompanied by central vestibular and central oculomotor dysfunction. Today's results should be interpreted with caution due to Ms. Manuel's use of Ambien...".  Audiogram completed same-day shows normal tympanograms without conductive hearing loss.  Specifically no low-frequency asymmetric hearing loss.  A 3K dip bilaterally with some recovery without marked asymmetry.    She returns today having had an episode as recently as last week which was attributed to viral illness/gastroenteritis.  Being treated for CATERINA with CPAP machine pending.  Completed vestibular therapy.  Generally feels better.  She feels at times she gets a low-grade almost a headache in a sense of dizziness but it tends to wane and rather than progressing to full on dizziness or vertigo.  No ear localizing symptoms.  She can not quite identify a trigger.    Some complain of runny nose and drip at times but no facial " "pressure or pain, loss of smell, purulent drainage or bleeding.    Repeat audio      10/19/2022 initial patient consultation: Brandi Manuel is a 77 y.o. female lifelong NON-smoker with A history of HLD, PTSD, lumbar radiculopathy status post lumbar fusion with sciatica referred to me by Jessica Hughes in consultation for  dizziness presented to the ER with vertigo 10 days ago.  Associated with right-sided nasal pressure and more prolonged dizziness and "foggy" head feeling.  Reports a prior history of episodic vertigo which was different.  ER notes from Dr. Turner 10/09/2022 reviewed no imaging.  Marked elevated blood pressure noted at 197/84 some right horizontal nystagmus noted no vertical nystagmus.  She was treated with intravenous benzodiazepines with a diagnosis of peripheral vertigo given her neurologic findings and history.  Blood pressure normal at her PCP visit 6 days ago.  Elevated again today but not as high at  174/75.      Patient has a CT of the head from 01/09/2014 images reviewed today reveal a small area of mucosal thickening of the mid left ethmoid and a nearly obstructing right frontal ethmoid osteoma with no secondary frontal mucosal thickening.  The balance of the paranasal sinuses look normal.  Middle ears and mastoids look normal.  Very limited imaging through the IAC CPA  area with normal symmetric IAC's and vestibular aqueducts and vestibular cochlear structures on axial bone window images    No vestibular testing.  Neuro otology visit  notes and Audiogram from [facility] 07/27/2021 reviewed reveals normal thresholds through 2000 hertz a bit of a noise notch to a mild degree at 4000 hertz bilaterally with recovery bilaterally slightly worse by 5 dB on the left side with what appeared to be normal tympanograms and no evidence of any conductive hearing loss or other asymmetry. Diagnosis of resolve BPPV based on history.  Patient does recall an episode of positional dizziness every time she " "would sit up in bed improving with meclizine which did improve with what sounds like an Epley maneuver performed by physical therapy.      Currently she is experienced a recent episode seemingly triggered by looking to the right (no focal neurologic defects such as visual disturbance lightheadedness or loss of consciousness consistent with vestibular artery stenosis) but feeling a sense of doom and impending death and nausea with a generalized mild headache.  She is had 2 other episodes like this which lasted hours often associated with a headache 1 time just right-sided but often in the face attributable to "sinus".  No fluctuating hearing loss or hissing or roaring tinnitus.  No identifiable aural fullness.        Review of Systems   Constitutional: Negative.    HENT:  Positive for sinus pressure. Negative for ear discharge, ear pain and hearing loss.    Eyes: Negative.    Respiratory: Negative.     Cardiovascular: Negative.    Gastrointestinal: Negative.    Endocrine: Negative.    Genitourinary: Negative.    Musculoskeletal: Negative.    Allergic/Immunologic: Negative.    Neurological:  Positive for dizziness.   Hematological: Negative.    Psychiatric/Behavioral: Negative.        Past Medical History  She has no past medical history on file.    Family / Surgical / Social History  Her family history includes Breast cancer in her daughter.    Past Surgical History:   Procedure Laterality Date    BREAST BIOPSY      HYSTERECTOMY      SHOULDER SURGERY      TONSILLECTOMY         Social History     Tobacco Use    Smoking status: Never    Smokeless tobacco: Never   Substance and Sexual Activity    Alcohol use: No    Drug use: Not on file    Sexual activity: Not on file       Medications  She has a current medication list which includes the following prescription(s): acetaminophen, bimatoprost, diclofenac sodium, meclizine, meloxicam, mirtazapine, ondansetron, triamcinolone acetonide 0.1%, zolpidem, and " benzonatate.      Allergies  Review of patient's allergies indicates:   Allergen Reactions    Codeine     Hydrocodone-acetaminophen Nausea Only       All medications, allergies, and past history have been reviewed.    Objective:      Vitals:  Vitals - 1 value per visit 2/6/2023 2/14/2023 2/14/2023   SYSTOLIC 156 - 175   DIASTOLIC 70 - 71   Pulse 62 - 58   Temp - - -   Resp - - -   SPO2 - - -   Weight (lb) - - 152.78   Weight (kg) - - 69.3   Height - - 62   BMI (Calculated) - - 27.9   VISIT REPORT - - -   Pain Score  - 0 -       Body surface area is 1.74 meters squared.    Physical Exam:    GENERAL  APPEARANCE -  alert, appears stated age, and cooperative  BARRIER(S) TO COMMUNICATION -  none VOICE - appropriate for age and gender    INTEGUMENTARY  no suspicious head and neck lesions    HEENT  HEAD: Normocephalic, without obvious abnormality, atraumatic  FACE: INSPECTION - Symmetric, no signs of trauma, no suspicious lesion(s)  PALPATION -  No masses SALIVARY GLANDS - non-tender with no appreciable mass  STRENGTH - facial symmetry  NECK/THYROID: normal atraumatic, no neck masses, normal thyroid, no jvd    EYES  Normal occular alignment and mobility with no visible nystagmus at rest    EARS/NOSE/MOUTH/THROAT  EARS  PINNAE AND EXTERNAL EARS - no suspicious lesion OTOSCOPIC EXAM (surgical microscopy was used for visualization/instrumentation): EAR EXAM - Normal ear canals, tympanic membranes and mobility, and middle ear spaces bilaterally.  HEARING - grossly intact to voice/finger rub    NOSE AND SINUSES  EXTERNAL NOSE -  small area of superfical ulceration and dryness w/o suspicious lesion right upper outer nasal vestibule  NOT appreciated today SEPTUM - normal/no obstruction on anterior exam without decongestion TURBINATES - within normal limits MUCOSA - within normal limits     MOUTH AND THROAT   ORAL CAVITY, LIPS, TEETH, GUMS & TONGUE - moist, no suspicious lesions  OROPHARYNX /TONSILS/PHARYNGEAL WALLS/HYPOPHARYNX -  no erythema or exudates  NASOPHARYNX - limited mirror exam - unable to visualize due to anatomy/gag  LARYNX -  - limited mirror exam - unable to visualize due to anatomy/gag      CHEST AND LUNG   INSPECTION & AUSCULTATION - normal effort, no stridor    CARDIOVASCULAR  AUSCULTATION & PERIPHERAL VASCULAR - regular rate and rhythm.    NEUROLOGIC  MENTAL STATUS - alert, interactive CRANIAL NERVES - normal    LYMPHATIC  HEAD AND NECK - normal    Procedure(s):  Cerumen removal performed.  See procedure note.    Labs:  WBC   Date Value Ref Range Status   01/09/2014 6.88 3.90 - 12.70 K/uL Final     Hemoglobin   Date Value Ref Range Status   01/09/2014 12.8 12.0 - 16.0 g/dL Final     Platelets   Date Value Ref Range Status   01/09/2014 182 150 - 350 K/uL Final     Creatinine   Date Value Ref Range Status   06/09/2022 1.1 0.5 - 1.4 mg/dL Final     TSH   Date Value Ref Range Status   10/12/2022 2.483 0.400 - 4.000 uIU/mL Final     Glucose   Date Value Ref Range Status   06/09/2022 89 70 - 110 mg/dL Final     Hemoglobin A1C   Date Value Ref Range Status   06/09/2022 5.7 (H) 4.0 - 5.6 % Final     Comment:     ADA Screening Guidelines:  5.7-6.4%  Consistent with prediabetes  >or=6.5%  Consistent with diabetes    High levels of fetal hemoglobin interfere with the HbA1C  assay. Heterozygous hemoglobin variants (HbS, HgC, etc)do  not significantly interfere with this assay.   However, presence of multiple variants may affect accuracy.           Assessment:      Problem List Items Addressed This Visit    None  Visit Diagnoses       Peripheral vertigo, unspecified laterality    -  Primary    Migrainous vertigo        Nasal ulcer                       Plan:      There appears to be significant compensation of unilateral vestibular weakness based on symptomatology from physical therapy.  Continued exercises to strengthen the core and stave off age-related frailty discussed at length.  Work on balance exercises including yoga and Ramon  Chi.  Do not underestimate the benefits of weight resistance exercise in fall prevention and longevity.      There is no suspicious finding on repeat audiology to prompt further evaluation and treatment.    For basic runny nose and drip symptoms saline rinses as outlined are recommended.  Avoid nasal steroids unless really necessary for nasal congestion and facial pressure.      General lifestyle measures for possible vestibular associated migraine (migraine associated dizziness) including hydration, good sleep including CPAP for CATERINA, vision correction and avoiding any potential food triggers.      Return with any worsening of symptoms, failure to improve, or any other concerns for further evaluation and treatment.

## 2023-02-14 NOTE — PATIENT INSTRUCTIONS
There appears to be significant compensation of unilateral vestibular weakness based on symptomatology from physical therapy.  Continued exercises to strengthen the core and stave off age-related frailty discussed at length.  Work on balance exercises including yoga and Ramon Chi.  Do not underestimate the benefits of weight resistance exercise in fall prevention and longevity.      There is no suspicious finding on repeat audiology to prompt further evaluation and treatment.    For basic runny nose and drip symptoms saline rinses as outlined are recommended.  Avoid nasal steroids unless really necessary for nasal congestion and facial pressure.      General lifestyle measures for possible vestibular associated migraine (migraine associated dizziness) including hydration, good sleep including CPAP for CATERINA, vision correction and avoiding any potential food triggers.      Return with any worsening of symptoms, failure to improve, or any other concerns for further evaluation and treatment.      NASAL SALINE    Still saline comes in many preparations including sprays/mists, gels, and rinses.  Different preparations served different purposes.  Saline spray helps to briefly moisturize the nose and help clear mucus.  Saline gels coat the nose for longer protective benefit of keeping the linings the nose moist.  Saline rinses clear the nose and sinuses and a more thorough way in her best used for significant postnasal drip and sinus complaints.  A combination of saline sprays/mists, gels and rinses should be used to address routine nasal clearing and dryness issues as well as flushing for better control of allergy and postnasal drip symptoms.  There is no real risk of over use of nasal saline products.  Saline sprays do not have any of the potential rebound or addiction of nasal decongestant sprays.  Nasal saline sprays and rinses should be used prior to the application of any medicated nasal sprays such as nasal steroids  or nasal antihistamine sprays.        SINUS RINSE INSTRUCTIONS    Nasal Saline Irrigation Instructions  You can wash your nasal and sinus passages using nasal saline (salt water) irrigation. This   is simple and effective. Follow the instructions below, as well as the ones provided by your   physician.  Supplies  First, you will need a nasal saline irrigation bottle and rinse solution.   You can purchase nasal rinse kits that include these items (such as   NeilMed®, Ayr®, Simply Saline®, Ocean Complete®) at most drug   stores. You can also make your own saline irrigation solution by   adding kosher (non-iodine) salt and baking soda to distilled water.   Your physician may tell you to add medications like a steroid or   antibiotic to the rinse as needed.  Steps for nasal irrigation  Step 1. Fill the bottle  ? Wash your hands.  ? Fill the irrigation bottle with lukewarm distilled water or boiled water that has cooled.  Step 2. Mix the solution  ? Put the saline and salt packet contents into the bottle.  ? Tighten the top of the bottle and shake it gently to dissolve the mixture.  ? If you are making your own solution:   - Add 1/4 to 1/2 teaspoon of baking soda and 1/8 teaspoon of kosher (non-iodine) salt   into the bottle.   - Tighten the top of the bottle.   - Shake the bottle gently to dissolve the mixture.  Step 3. Get into position  ?  front of the sink.  ? Unless you were instructed to use another position, bend forward.   Then tilt your face down about 45 degrees so that you are looking   down into the sink.  ? Gently place the spout of the saline bottle against 1 of your nostrils.  [facility]  CARE AND TREATMENT  Patient Education  ©2018 [employer]  ©2018 [employer]  Step 4. Rinse  ? Breathing through your mouth, gently squeeze the   bottle. This will squirt the solution into your nostril. The   solution will start to drain from the other  nostril. Some   may drain from your mouth. This is normal.  ? Use 2 ounces (half of the bottle) on each nostril.  ? Afterwards, you may need to blow your nose gently to   help drain any solution that is left behind.  Step 5. Repeat  ? Repeat steps 3 and 4 with the other nostril.  You can watch a video to learn how to do nasal saline irrigation. Go to iPAYst.com and   search for NeilMed Sinus Rinse.  Step 6.  Clean the bottle and cap. Air dry the Sinus Rinse bottle, cap, and tube on a clean paper towel, a lint free towel, or use NeilMed® NasaDOCK® or NasaDOCK plus (sold separately) to store the bottle, cap and tube.  Please read Warnings before using.  Our recommendation is to replace the bottle every three months.      NEILMED FRANSISCOG INSTRUCTIONS    It is very important to keep these devices clean and free from any contamination. Replace the bottle every 3 months.  NasaDock Plus  NasaDock NeilMed® SINUS RINSE Squeeze Bottle: Please perform routine inspections of the bottle and tube for any discolorations and cracks. If there are any visual signs of deterioration or permanent color changes, please clean thoroughly. If the discolorations remain after cleansing, discard the items and purchase new ones. Please follow these instructions after each use of the product. Be sure to replace your product after three months.  Step 1: Rinse the cap, tube and bottle using running water. Fill the bottle with distilled, micro-filtered (through 0.2 micron), reverse osmosis filtered, commercially bottled or previously boiled and cooled down water at lukewarm or body temperature..  Step 2: Add a few drops of dish washing liquid or baby shampoo.  Step 3: Attach the cap and tube to the bottle; hold your finger over the opening in the cap and shake the bottle vigorously.  Step 4: Squeeze the bottle hard to allow the soapy solution to clean the interior of the tube and the cap. Empty out the bottle completely.  Step 5: Rinse the  soap from the bottle, cap and tube thoroughly and place the items on a clean paper towel to dry or use the preferred NasaDOCK® or NasaDOCK plus.    The NasaDOCK® is a simple, hygienic way to dry and store the SINUS RINSE bottle, cap and tube. NasaDOCK® comes with various hanging options and is available in different colors. Our newest model also offers storage for our SINUS RINSE mixture packets. We strongly suggest using NasaDOCK® as an inexpensive, easy way to dry the cap, tube and SINUS RINSE bottle.        Cleaning:  Do not use a  to clean the inside of a bottle. While our bottle is  safe, a  will not adequately clean the SINUS RINSE bottle. The water jets in dishwashers cannot enter the narrow neck of the bottle, and portions of the bottles interior will not be cleaned thoroughly. Additional methods of cleaning the bottle include the use of concentrated white vinegar or isopropyl alcohol (70% concentration), followed by scrubbing and rinsing as described above.       Microwave Disinfection  Clean the device with soap and water as mentioned above and shake off the excess water. Now place the bottle, cap and tube in the microwave for 40 seconds. This will disinfect the bottle, cap and tube. If the microwave has been used recently, please make sure that the inside of the microwave has cooled back down to room temperature before using it to disinfect the bottle.    NeilMed NasaFlo® Neti Pot Users:  Use the same procedure as above.    Sinugator® Cleaning Directions:  Clean the Sinugator® by running plain water and dry with a clean lint free towel and then air dry the unit by keeping it open to the air. The nasal  tip, blue reservoir and white soft tube can be disinfected by cleaning with soap and water and shaking off the excess water before placing in the home microwave for 60 seconds. Clean the entire unit with a few drops of dishwashing liquid and water every  three days to keep the unit clean. As a fi nal rinse to wash off any residual soap or tap water, use either distilled, micro-filtered (through 0.2 micron filter), commercially bottled or previously boiled & cooled down water. Please make sure to rinse thoroughly during each wash so no soap is left behind. DO NOT place the white motor unit in microwave for disinfection. Because of the units stainless steel components, this can cause damage or fire hazards.    General Principles of Maintenance & Storage:  When permissible use a microwave periodically to disinfect devices. Always store NeilMed® products in a cool and dry place with adequate ventilation. NasaDOCK® or NasaDOCK plus offer a simple hygienic way to air dry & neatly store the bottle, cap, tube and NasaFlo. Do not store the bottle with the cap screwed on, unless both are dry. Do not store the wet parts in a sealed plastic bag. If you travel before they are dry, wrap parts separately in paper towels. Hand soap or shampoo can be used for cleaning parts while away from home.        USE ONLY AS DIRECTED, IF SYMPTOMS PERSIST SEE YOUR DOCTOR/HEALTHCARE PROFESSIONAL. ALWAYS READ THE LABEL.

## 2023-03-02 ENCOUNTER — OFFICE VISIT (OUTPATIENT)
Dept: OBSTETRICS AND GYNECOLOGY | Facility: CLINIC | Age: 78
End: 2023-03-02
Payer: MEDICARE

## 2023-03-02 VITALS
WEIGHT: 152.13 LBS | HEIGHT: 62 IN | SYSTOLIC BLOOD PRESSURE: 150 MMHG | DIASTOLIC BLOOD PRESSURE: 90 MMHG | BODY MASS INDEX: 27.99 KG/M2

## 2023-03-02 DIAGNOSIS — E66.9 OBESITY (BMI 30-39.9): ICD-10-CM

## 2023-03-02 DIAGNOSIS — Z12.31 VISIT FOR SCREENING MAMMOGRAM: ICD-10-CM

## 2023-03-02 DIAGNOSIS — Z01.419 ENCOUNTER FOR GYNECOLOGICAL EXAMINATION WITHOUT ABNORMAL FINDING: ICD-10-CM

## 2023-03-02 DIAGNOSIS — E78.5 HYPERLIPIDEMIA, UNSPECIFIED HYPERLIPIDEMIA TYPE: ICD-10-CM

## 2023-03-02 DIAGNOSIS — R23.2 HOT FLASHES: Primary | ICD-10-CM

## 2023-03-02 PROCEDURE — G0101 CA SCREEN;PELVIC/BREAST EXAM: HCPCS | Mod: S$PBB,,, | Performed by: OBSTETRICS & GYNECOLOGY

## 2023-03-02 PROCEDURE — 99999 PR PBB SHADOW E&M-EST. PATIENT-LVL III: CPT | Mod: PBBFAC,,, | Performed by: OBSTETRICS & GYNECOLOGY

## 2023-03-02 PROCEDURE — 99999 PR PBB SHADOW E&M-EST. PATIENT-LVL III: ICD-10-PCS | Mod: PBBFAC,,, | Performed by: OBSTETRICS & GYNECOLOGY

## 2023-03-02 PROCEDURE — G0101 PR CA SCREEN;PELVIC/BREAST EXAM: ICD-10-PCS | Mod: S$PBB,,, | Performed by: OBSTETRICS & GYNECOLOGY

## 2023-03-02 PROCEDURE — 99213 OFFICE O/P EST LOW 20 MIN: CPT | Mod: PBBFAC,PN | Performed by: OBSTETRICS & GYNECOLOGY

## 2023-03-02 RX ORDER — PAROXETINE 10 MG/1
10 TABLET, FILM COATED ORAL EVERY MORNING
Qty: 90 TABLET | Refills: 3 | Status: SHIPPED | OUTPATIENT
Start: 2023-03-02 | End: 2023-08-07 | Stop reason: SDUPTHER

## 2023-03-03 ENCOUNTER — TELEPHONE (OUTPATIENT)
Dept: PRIMARY CARE CLINIC | Facility: CLINIC | Age: 78
End: 2023-03-03
Payer: MEDICARE

## 2023-03-03 DIAGNOSIS — Z12.11 COLON CANCER SCREENING: Primary | ICD-10-CM

## 2023-03-03 DIAGNOSIS — Z86.010 PERSONAL HISTORY OF COLONIC POLYPS: ICD-10-CM

## 2023-03-03 NOTE — TELEPHONE ENCOUNTER
Called pt re: scheduling colonoscopy- no answer. Colonoscopy from [facility] 02/2021 states to retest in 5 years, test not due until 02/2026.

## 2023-03-03 NOTE — PROGRESS NOTES
HISTORY OF PRESENT ILLNESS:    Brandi Manuel is a 77 y.o. female,   presents today for her routine visit.   She reports hat hot flashes have returned after stopping HRT.     A full discussion of the benefit-risk ratio of hormonal replacement therapy was carried out. Improvement in vasomotor and other climacteric symptoms is discussed, including possible improvements in sleep and mood. Reduction of risk for osteoporosis was explained. We discussed the study data showing increased risk of thrombo-embolic events such as myocardial infarction, stroke and also possibly breast cancer with estrogen replacement, and how this might affect her. The range of side effects such as breast tenderness, weight gain and including possible increases in lifetime risk of breast cancer and possible thrombotic complications was discussed. We also discussed ACOG's recommendation to use hormone replacement therapy for the relief of hot flashes alone and to be on the lowest dose possible for the shortest amount of time.  Alternative such as herbal and soy-based products were reviewed. All of her questions about this therapy were answered.    History reviewed. No pertinent past medical history.    Past Surgical History:   Procedure Laterality Date    BREAST BIOPSY      HYSTERECTOMY      SHOULDER SURGERY      TONSILLECTOMY       MEDICATIONS AND ALLERGIES:    Current Outpatient Medications:     acetaminophen (TYLENOL) 650 MG TbSR, Take 1,300 mg by mouth once daily., Disp: , Rfl:     bimatoprost (LATISSE) 0.03 % ophthalmic solution, Place one drop on applicator and apply evenly along the skin of the upper eyelid at base of eyelashes once daily at bedtime; repeat procedure for second eye (use a clean applicator)., Disp: 5 mL, Rfl: 4    diclofenac sodium 2 % SoPk, Apply 40 mg topically 2 (two) times a day., Disp: 1 packet, Rfl: 1    meclizine (ANTIVERT) 25 mg tablet, Take 1 tablet (25 mg total) by mouth 2 (two) times daily as needed for  Dizziness., Disp: 90 tablet, Rfl: 0    meloxicam (MOBIC) 15 MG tablet, Take 7.5 mg by mouth once daily., Disp: , Rfl:     mirtazapine (REMERON) 7.5 MG Tab, Take 7.5 mg by mouth every evening., Disp: , Rfl:     ondansetron (ZOFRAN-ODT) 4 MG TbDL, Take 1 tablet (4 mg total) by mouth every 8 (eight) hours as needed (nausea / vomiting)., Disp: 20 tablet, Rfl: 0    triamcinolone acetonide 0.1% (KENALOG) 0.1 % ointment, Apply topically 3 (three) times daily., Disp: 453.6 g, Rfl: 3    zolpidem (AMBIEN CR) 12.5 MG CR tablet, Take 12.5 mg by mouth., Disp: , Rfl:     paroxetine (PAXIL) 10 MG tablet, Take 1 tablet (10 mg total) by mouth every morning., Disp: 90 tablet, Rfl: 3    Review of patient's allergies indicates:   Allergen Reactions    Codeine     Hydrocodone-acetaminophen Nausea Only       Family History   Problem Relation Age of Onset    Breast cancer Daughter        Social History     Socioeconomic History    Marital status:    Tobacco Use    Smoking status: Never    Smokeless tobacco: Never   Substance and Sexual Activity    Alcohol use: No     COMPREHENSIVE GYN HISTORY:  PAP History: Denies abnormal Paps.  Infection History: Denies STDs. Denies PID.  Benign History: Denies uterine fibroids. Denies ovarian cysts. Denies endometriosis. Denies other conditions.  Cancer History: Denies cervical cancer. Denies uterine cancer or hyperplasia. Denies ovarian cancer. Denies vulvar cancer or pre-cancer. Denies vaginal cancer or pre-cancer. Denies breast cancer. Denies colon cancer.  Sexual Activity History: Reports currently being sexually active  Menstrual History: Denies menses. Pt is  not on ERT.     ROS:  GENERAL: No weight changes. No swelling. No fatigue. No fever.  CARDIOVASCULAR: No chest pain. No shortness of breath. No leg cramps.   NEUROLOGICAL: No headaches. No vision changes.  BREASTS: No pain. No lumps. No discharge.  ABDOMEN: No pain. No nausea. No vomiting. No diarrhea. No  "constipation.  REPRODUCTIVE: No abnormal bleeding.  VULVA: No pain. No lesions. No itching.  VAGINA: No relaxation. No itching. No odor. No discharge. No lesions.  URINARY: No incontinence. No nocturia. No frequency. No dysuria.    BP (!) 150/90   Ht 5' 2" (1.575 m)   Wt 69 kg (152 lb 1.9 oz)   BMI 27.82 kg/m²     PE:  APPEARANCE: Well nourished, well developed, in no acute distress.  AFFECT: WNL, alert and oriented x 3.  SKIN: No acne or hirsutism.  NECK: Neck symmetric without masses or thyromegaly.  NODES: No inguinal, cervical, axillary or femoral lymph node enlargement.  CHEST: Good respiratory effort.   ABDOMEN: Soft. No tenderness or masses. No hepatosplenomegaly. No hernias.  BREASTS: Symmetrical, no skin changes or visible lesions. No palpable masses, nipple discharge bilaterally.  PELVIC: ATROPHIC EXTERNAL FEMALE GENITALIA without lesions. Normal hair distribution. Adequate perineal body, normal urethral meatus. VAGINA DRY without lesions or discharge. No significant cystocele or rectocele. Bimanual exam shows CERVIX and UTERUS to be SURGICALLY ABSENT. Adnexa without masses or tenderness.  EXTREMITIES: No edema.    DIAGNOSIS:  1. Hot flashes    2. Visit for screening mammogram    3. Encounter for gynecological examination without abnormal finding    4. Hyperlipidemia, unspecified hyperlipidemia type    5. Obesity (BMI 30-39.9)        Orders Placed This Encounter    Mammo Digital Screening Bilat w/ [facility]    paroxetine (PAXIL) 10 MG tablet       COUNSELING:  The patient was counseled today on  -osteoporosis prevention, calcium supplementation, regular weight bearing exercise.  -ACS PAP guidelines (no paps), with recommendations for yearly pelvic exams as her uterus and cervix were removed for benign reasons and ovaries remain;  -recommendation for yearly mammogram;  -to see her primary care physician for all other health maintenance.    FOLLOW-UP with me for next routine visit.   "

## 2023-03-03 NOTE — TELEPHONE ENCOUNTER
----- Message from Moira Edward sent at 3/3/2023  4:33 PM CST -----  Contact: [phone]  Pt requesting orders be entered for a screening colonoscopy. Can you please assist.

## 2023-04-04 ENCOUNTER — PATIENT MESSAGE (OUTPATIENT)
Dept: RESEARCH | Facility: HOSPITAL | Age: 78
End: 2023-04-04
Payer: MEDICARE

## 2023-05-09 ENCOUNTER — PATIENT MESSAGE (OUTPATIENT)
Dept: RESEARCH | Facility: HOSPITAL | Age: 78
End: 2023-05-09
Payer: MEDICARE

## 2023-05-16 ENCOUNTER — PATIENT MESSAGE (OUTPATIENT)
Dept: RESEARCH | Facility: HOSPITAL | Age: 78
End: 2023-05-16
Payer: MEDICARE

## 2023-05-25 ENCOUNTER — TELEPHONE (OUTPATIENT)
Dept: SLEEP MEDICINE | Facility: CLINIC | Age: 78
End: 2023-05-25
Payer: MEDICARE

## 2023-05-25 ENCOUNTER — HOSPITAL ENCOUNTER (OUTPATIENT)
Dept: RADIOLOGY | Facility: HOSPITAL | Age: 78
Discharge: HOME | End: 2023-05-25
Attending: OBSTETRICS & GYNECOLOGY
Payer: MEDICARE

## 2023-05-25 DIAGNOSIS — Z12.31 VISIT FOR SCREENING MAMMOGRAM: ICD-10-CM

## 2023-05-25 PROCEDURE — 77067 MAMMO DIGITAL SCREENING BILAT WITH TOMO: ICD-10-PCS | Mod: 26,,, | Performed by: RADIOLOGY

## 2023-05-25 PROCEDURE — 77067 SCR MAMMO BI INCL CAD: CPT | Mod: TC,PN

## 2023-05-25 PROCEDURE — 77067 SCR MAMMO BI INCL CAD: CPT | Mod: 26,,, | Performed by: RADIOLOGY

## 2023-05-25 PROCEDURE — 77063 BREAST TOMOSYNTHESIS BI: CPT | Mod: 26,,, | Performed by: RADIOLOGY

## 2023-05-25 PROCEDURE — 77063 MAMMO DIGITAL SCREENING BILAT WITH TOMO: ICD-10-PCS | Mod: 26,,, | Performed by: RADIOLOGY

## 2023-05-25 NOTE — TELEPHONE ENCOUNTER
----- Message from Danielle Carlin sent at 5/25/2023 10:23 AM CDT -----  Name of Who is Calling: [facility]              What is the request in detail: Rep requesting a call back to discuss clinical notes between 3/18 - 4/22  Fax [phone]            Can the clinic reply by [facility]: No              [facility] [phone] Ref Number [id number]

## 2023-07-28 ENCOUNTER — PATIENT MESSAGE (OUTPATIENT)
Dept: OBSTETRICS AND GYNECOLOGY | Facility: CLINIC | Age: 78
End: 2023-07-28
Payer: MEDICARE

## 2023-08-07 ENCOUNTER — OFFICE VISIT (OUTPATIENT)
Dept: PRIMARY CARE CLINIC | Facility: CLINIC | Age: 78
End: 2023-08-07
Payer: MEDICARE

## 2023-08-07 VITALS
DIASTOLIC BLOOD PRESSURE: 80 MMHG | SYSTOLIC BLOOD PRESSURE: 140 MMHG | WEIGHT: 158.94 LBS | BODY MASS INDEX: 29.25 KG/M2 | HEART RATE: 72 BPM | HEIGHT: 62 IN | OXYGEN SATURATION: 98 %

## 2023-08-07 DIAGNOSIS — R73.03 PREDIABETES: ICD-10-CM

## 2023-08-07 DIAGNOSIS — Z13.220 ENCOUNTER FOR LIPID SCREENING FOR CARDIOVASCULAR DISEASE: ICD-10-CM

## 2023-08-07 DIAGNOSIS — Z98.1 S/P LUMBAR SPINAL FUSION: Primary | ICD-10-CM

## 2023-08-07 DIAGNOSIS — Z13.6 ENCOUNTER FOR LIPID SCREENING FOR CARDIOVASCULAR DISEASE: ICD-10-CM

## 2023-08-07 DIAGNOSIS — I10 ESSENTIAL HYPERTENSION: Primary | ICD-10-CM

## 2023-08-07 DIAGNOSIS — R23.2 HOT FLASHES: ICD-10-CM

## 2023-08-07 DIAGNOSIS — R21 RASH AND NONSPECIFIC SKIN ERUPTION: ICD-10-CM

## 2023-08-07 PROCEDURE — 99214 PR OFFICE/OUTPT VISIT, EST, LEVL IV, 30-39 MIN: ICD-10-PCS | Mod: S$PBB,,, | Performed by: FAMILY MEDICINE

## 2023-08-07 PROCEDURE — 99214 OFFICE O/P EST MOD 30 MIN: CPT | Mod: S$PBB,,, | Performed by: FAMILY MEDICINE

## 2023-08-07 PROCEDURE — 99999 PR PBB SHADOW E&M-EST. PATIENT-LVL III: CPT | Mod: PBBFAC,,, | Performed by: FAMILY MEDICINE

## 2023-08-07 PROCEDURE — 99999 PR PBB SHADOW E&M-EST. PATIENT-LVL III: ICD-10-PCS | Mod: PBBFAC,,, | Performed by: FAMILY MEDICINE

## 2023-08-07 PROCEDURE — 99213 OFFICE O/P EST LOW 20 MIN: CPT | Mod: PBBFAC,PN | Performed by: FAMILY MEDICINE

## 2023-08-07 RX ORDER — AMLODIPINE BESYLATE 5 MG/1
5 TABLET ORAL DAILY
Qty: 10 TABLET | Refills: 0 | Status: SHIPPED | OUTPATIENT
Start: 2023-08-07 | End: 2024-02-19

## 2023-08-07 RX ORDER — PAROXETINE 10 MG/1
10 TABLET, FILM COATED ORAL EVERY MORNING
Qty: 90 TABLET | Refills: 3 | Status: SHIPPED | OUTPATIENT
Start: 2023-08-07 | End: 2024-02-19

## 2023-08-07 RX ORDER — AMLODIPINE BESYLATE 5 MG/1
5 TABLET ORAL DAILY
Qty: 90 TABLET | Refills: 3 | Status: SHIPPED | OUTPATIENT
Start: 2023-08-07

## 2023-08-07 RX ORDER — TRIAMCINOLONE ACETONIDE 1 MG/G
OINTMENT TOPICAL 3 TIMES DAILY
Qty: 453.6 G | Refills: 3 | Status: SHIPPED | OUTPATIENT
Start: 2023-08-07

## 2023-08-07 RX ORDER — ESCITALOPRAM OXALATE 20 MG
20 TABLET ORAL
COMMUNITY
Start: 2023-05-26 | End: 2023-08-07

## 2023-08-07 NOTE — PROGRESS NOTES
Clinic Note  8/7/2023      Subjective:       Patient ID:  Brandi is a 77 y.o. female being seen for an established visit.    Chief Complaint: Annual Exam    Annual exam-patient with a history of hyperlipidemia, multiple back surgeries, hot flashes, sleep apnea here for annual exam      Elevated blood pressures -  Has not been on blood pressure medications.  Has had multiple clinic visits with elevated blood pressures     Hot flashes -was seen by OBGYN for this, was prescribed Paxil which patient never got from pharmacy    Chronic lower back pain-history of lumbar spinal fusion and multiple other back surgeries x4 with chronic lower back pain.  Requesting refills of Voltaren gel.  Doing physical therapy for this, tries to stay active    Review of Systems   Constitutional:  Negative for chills, fever, malaise/fatigue and weight loss.   HENT:  Negative for congestion, sinus pain and sore throat.    Respiratory:  Negative for cough, shortness of breath and wheezing.    Cardiovascular:  Negative for chest pain and palpitations.   Gastrointestinal:  Negative for constipation, diarrhea, nausea and vomiting.   Genitourinary:  Negative for dysuria, frequency and urgency.   Musculoskeletal:  Positive for back pain. Negative for myalgias.   Skin:  Negative for rash.   Neurological:  Negative for headaches.       Medication List with Changes/Refills   New Medications    AMLODIPINE (NORVASC) 5 MG TABLET    Take 1 tablet (5 mg total) by mouth once daily. High blood pressure    AMLODIPINE (NORVASC) 5 MG TABLET    Take 1 tablet (5 mg total) by mouth once daily.   Current Medications    ACETAMINOPHEN (TYLENOL) 650 MG TBSR    Take 1,300 mg by mouth once daily.    BIMATOPROST (LATISSE) 0.03 % OPHTHALMIC SOLUTION    Place one drop on applicator and apply evenly along the skin of the upper eyelid at base of eyelashes once daily at bedtime; repeat procedure for second eye (use a clean applicator).    MECLIZINE (ANTIVERT) 25 MG TABLET     Take 1 tablet (25 mg total) by mouth 2 (two) times daily as needed for Dizziness.    MELOXICAM (MOBIC) 15 MG TABLET    Take 7.5 mg by mouth once daily.    MIRTAZAPINE (REMERON) 7.5 MG TAB    Take 7.5 mg by mouth every evening.    ONDANSETRON (ZOFRAN-ODT) 4 MG TBDL    Take 1 tablet (4 mg total) by mouth every 8 (eight) hours as needed (nausea / vomiting).    ZOLPIDEM (AMBIEN CR) 12.5 MG CR TABLET    Take 12.5 mg by mouth.   Changed and/or Refilled Medications    Modified Medication Previous Medication    DICLOFENAC SODIUM 2 % SOPK diclofenac sodium 2 % SoPk       Apply 40 mg topically 2 (two) times a day.    Apply 40 mg topically 2 (two) times a day.    PAROXETINE (PAXIL) 10 MG TABLET paroxetine (PAXIL) 10 MG tablet       Take 1 tablet (10 mg total) by mouth every morning. Hot flashes    Take 1 tablet (10 mg total) by mouth every morning.    TRIAMCINOLONE ACETONIDE 0.1% (KENALOG) 0.1 % OINTMENT triamcinolone acetonide 0.1% (KENALOG) 0.1 % ointment       Apply topically 3 (three) times daily.    Apply topically 3 (three) times daily.   Discontinued Medications    LEXAPRO 20 MG TABLET    Take 20 mg by mouth.       Patient Active Problem List   Diagnosis    Benign paroxysmal vertigo, bilateral    Dermatitis    Generalized muscle weakness    Hearing loss    Hot flash, menopausal    Hyperlipidemia    IFG (impaired fasting glucose)    Insomnia    Chronic bilateral low back pain without sciatica    Lumbar stenosis with neurogenic claudication    Osteoarthritis    Other depressive disorder    Posttraumatic stress disorder    Pure hypercholesterolemia, unspecified    Right leg paresthesias    Sciatica    Spondylolisthesis at L4-L5 level    DDD (degenerative disc disease), lumbar    Lumbar radiculopathy    S/P lumbar fusion    Obesity (BMI 30-39.9)    Hot flash not due to menopause    Family history of colon cancer    Family history of breast cancer    COVID    Skin spots-aging    Sleep apnea    Weakness of both lower  "extremities           Objective:      BP (!) 140/80   Pulse 72   Ht 5' 2" (1.575 m)   Wt 72.1 kg (158 lb 15.2 oz)   SpO2 98%   BMI 29.07 kg/m²   Estimated body mass index is 29.07 kg/m² as calculated from the following:    Height as of this encounter: 5' 2" (1.575 m).    Weight as of this encounter: 72.1 kg (158 lb 15.2 oz).  Physical Exam  Vitals reviewed.   Constitutional:       General: She is not in acute distress.     Appearance: She is not diaphoretic.   HENT:      Head: Normocephalic and atraumatic.   Eyes:      Conjunctiva/sclera: Conjunctivae normal.   Cardiovascular:      Rate and Rhythm: Normal rate and regular rhythm.      Heart sounds: Normal heart sounds.   Pulmonary:      Effort: Pulmonary effort is normal. No respiratory distress.      Breath sounds: Normal breath sounds. No wheezing.   Abdominal:      General: Bowel sounds are normal.      Palpations: Abdomen is soft.   Musculoskeletal:         General: Normal range of motion.      Cervical back: Normal range of motion.   Skin:     General: Skin is warm and dry.      Findings: No erythema or rash.   Neurological:      Mental Status: She is alert and oriented to person, place, and time.   Psychiatric:         Mood and Affect: Mood and affect normal.         Behavior: Behavior normal.         Thought Content: Thought content normal.         Judgment: Judgment normal.           Assessment and Plan:     1. Rash and nonspecific skin eruption  - triamcinolone acetonide 0.1% (KENALOG) 0.1 % ointment; Apply topically 3 (three) times daily.  Dispense: 453.6 g; Refill: 3  - CBC Auto Differential; Future    2. Hot flashes  - paroxetine (PAXIL) 10 MG tablet; Take 1 tablet (10 mg total) by mouth every morning. Hot flashes  Dispense: 90 tablet; Refill: 3  - CBC Auto Differential; Future    3. Essential hypertension  - blood pressure today 140/80 and blood pressure consistently elevated over previous clinic visits, will start patient on amlodipine 5 mg q.d..  " Nursing blood pressure check in 2 weeks  - CBC Auto Differential; Future  - Comprehensive Metabolic Panel; Future  - amLODIPine (NORVASC) 5 MG tablet; Take 1 tablet (5 mg total) by mouth once daily.  Dispense: 10 tablet; Refill: 0    4. Prediabetes  - CBC Auto Differential; Future  - Hemoglobin A1C; Future    5. Encounter for lipid screening for cardiovascular disease  - CBC Auto Differential; Future  - Lipid Panel; Future        Follow Up:   Follow up for 2 wk nursing BP visit.    Other Orders Placed This Visit:  Orders Placed This Encounter   Procedures    CBC Auto Differential    Comprehensive Metabolic Panel    Lipid Panel    Hemoglobin A1C         Hao Chau MD        This note is dictated on M*Modal word recognition program.  There are word recognition mistakes that are occasionally missed on review.

## 2023-08-08 RX ORDER — PAROXETINE 10 MG/1
10 TABLET, FILM COATED ORAL DAILY
Qty: 30 TABLET | Refills: 2 | Status: CANCELLED | OUTPATIENT
Start: 2023-08-08 | End: 2024-08-07

## 2023-08-09 ENCOUNTER — PATIENT MESSAGE (OUTPATIENT)
Dept: ADMINISTRATIVE | Facility: HOSPITAL | Age: 78
End: 2023-08-09
Payer: MEDICARE

## 2023-08-21 ENCOUNTER — LAB VISIT (OUTPATIENT)
Dept: PRIMARY CARE CLINIC | Facility: CLINIC | Age: 78
End: 2023-08-21
Payer: MEDICARE

## 2023-08-21 ENCOUNTER — CLINICAL SUPPORT (OUTPATIENT)
Dept: PRIMARY CARE CLINIC | Facility: CLINIC | Age: 78
End: 2023-08-21
Payer: MEDICARE

## 2023-08-21 DIAGNOSIS — R21 RASH AND NONSPECIFIC SKIN ERUPTION: ICD-10-CM

## 2023-08-21 DIAGNOSIS — Z13.220 ENCOUNTER FOR LIPID SCREENING FOR CARDIOVASCULAR DISEASE: ICD-10-CM

## 2023-08-21 DIAGNOSIS — Z13.6 ENCOUNTER FOR LIPID SCREENING FOR CARDIOVASCULAR DISEASE: ICD-10-CM

## 2023-08-21 DIAGNOSIS — Z01.30 BLOOD PRESSURE CHECK: Primary | ICD-10-CM

## 2023-08-21 DIAGNOSIS — R73.03 PREDIABETES: ICD-10-CM

## 2023-08-21 DIAGNOSIS — R23.2 HOT FLASHES: ICD-10-CM

## 2023-08-21 DIAGNOSIS — I10 ESSENTIAL HYPERTENSION: ICD-10-CM

## 2023-08-21 LAB
ALBUMIN SERPL BCP-MCNC: 4 G/DL (ref 3.5–5.2)
ALP SERPL-CCNC: 108 U/L (ref 55–135)
ALT SERPL W/O P-5'-P-CCNC: 14 U/L (ref 10–44)
ANION GAP SERPL CALC-SCNC: 9 MMOL/L (ref 8–16)
AST SERPL-CCNC: 30 U/L (ref 10–40)
BASOPHILS # BLD AUTO: 0.03 K/UL (ref 0–0.2)
BASOPHILS NFR BLD: 0.9 % (ref 0–1.9)
BILIRUB SERPL-MCNC: 0.4 MG/DL (ref 0.1–1)
BUN SERPL-MCNC: 15 MG/DL (ref 8–23)
CALCIUM SERPL-MCNC: 9.5 MG/DL (ref 8.7–10.5)
CHLORIDE SERPL-SCNC: 103 MMOL/L (ref 95–110)
CHOLEST SERPL-MCNC: 166 MG/DL (ref 120–199)
CHOLEST/HDLC SERPL: 3.2 {RATIO} (ref 2–5)
CO2 SERPL-SCNC: 28 MMOL/L (ref 23–29)
CREAT SERPL-MCNC: 1.2 MG/DL (ref 0.5–1.4)
DIFFERENTIAL METHOD: ABNORMAL
EOSINOPHIL # BLD AUTO: 0.1 K/UL (ref 0–0.5)
EOSINOPHIL NFR BLD: 1.5 % (ref 0–8)
ERYTHROCYTE [DISTWIDTH] IN BLOOD BY AUTOMATED COUNT: 12.4 % (ref 11.5–14.5)
EST. GFR  (NO RACE VARIABLE): 46.9 ML/MIN/1.73 M^2
ESTIMATED AVG GLUCOSE: 117 MG/DL (ref 68–131)
GLUCOSE SERPL-MCNC: 81 MG/DL (ref 70–110)
HBA1C MFR BLD: 5.7 % (ref 4–5.6)
HCT VFR BLD AUTO: 36.3 % (ref 37–48.5)
HDLC SERPL-MCNC: 52 MG/DL (ref 40–75)
HDLC SERPL: 31.3 % (ref 20–50)
HGB BLD-MCNC: 11.3 G/DL (ref 12–16)
IMM GRANULOCYTES # BLD AUTO: 0 K/UL (ref 0–0.04)
IMM GRANULOCYTES NFR BLD AUTO: 0 % (ref 0–0.5)
LDLC SERPL CALC-MCNC: 100.6 MG/DL (ref 63–159)
LYMPHOCYTES # BLD AUTO: 1.8 K/UL (ref 1–4.8)
LYMPHOCYTES NFR BLD: 54.6 % (ref 18–48)
MCH RBC QN AUTO: 29.9 PG (ref 27–31)
MCHC RBC AUTO-ENTMCNC: 31.1 G/DL (ref 32–36)
MCV RBC AUTO: 96 FL (ref 82–98)
MONOCYTES # BLD AUTO: 0.3 K/UL (ref 0.3–1)
MONOCYTES NFR BLD: 9.3 % (ref 4–15)
NEUTROPHILS # BLD AUTO: 1.1 K/UL (ref 1.8–7.7)
NEUTROPHILS NFR BLD: 33.7 % (ref 38–73)
NONHDLC SERPL-MCNC: 114 MG/DL
NRBC BLD-RTO: 0 /100 WBC
PLATELET # BLD AUTO: 147 K/UL (ref 150–450)
PMV BLD AUTO: 11.5 FL (ref 9.2–12.9)
POTASSIUM SERPL-SCNC: 4.6 MMOL/L (ref 3.5–5.1)
PROT SERPL-MCNC: 7.5 G/DL (ref 6–8.4)
RBC # BLD AUTO: 3.78 M/UL (ref 4–5.4)
SODIUM SERPL-SCNC: 140 MMOL/L (ref 136–145)
TRIGL SERPL-MCNC: 67 MG/DL (ref 30–150)
WBC # BLD AUTO: 3.35 K/UL (ref 3.9–12.7)

## 2023-08-21 PROCEDURE — 80061 LIPID PANEL: CPT | Performed by: FAMILY MEDICINE

## 2023-08-21 PROCEDURE — 80053 COMPREHEN METABOLIC PANEL: CPT | Performed by: FAMILY MEDICINE

## 2023-08-21 PROCEDURE — 85025 COMPLETE CBC W/AUTO DIFF WBC: CPT | Performed by: FAMILY MEDICINE

## 2023-08-21 PROCEDURE — 83036 HEMOGLOBIN GLYCOSYLATED A1C: CPT | Performed by: FAMILY MEDICINE

## 2023-08-21 NOTE — PROGRESS NOTES
Pt came in for a blood pressure check as ordered by Dr. Chau. Pt is presently taking Norvasc to help treat her hypertension. B/P read as 144/82  pt did admit to taking her medication this morning as prescribe. Writer will inform Dr. Chau of her B/P.

## 2023-09-29 ENCOUNTER — HOSPITAL ENCOUNTER (OUTPATIENT)
Dept: RADIOLOGY | Facility: HOSPITAL | Age: 78
Discharge: HOME | End: 2023-09-29
Attending: ORTHOPAEDIC SURGERY
Payer: MEDICARE

## 2023-09-29 ENCOUNTER — OFFICE VISIT (OUTPATIENT)
Dept: ORTHOPEDICS | Facility: CLINIC | Age: 78
End: 2023-09-29
Payer: MEDICARE

## 2023-09-29 VITALS — BODY MASS INDEX: 29.48 KG/M2 | WEIGHT: 161.19 LBS

## 2023-09-29 DIAGNOSIS — Z98.1 STATUS POST LUMBAR SPINAL FUSION: Primary | ICD-10-CM

## 2023-09-29 DIAGNOSIS — Z98.890 S/P SPINAL SURGERY: ICD-10-CM

## 2023-09-29 PROCEDURE — 99213 OFFICE O/P EST LOW 20 MIN: CPT | Mod: S$PBB,,, | Performed by: ORTHOPAEDIC SURGERY

## 2023-09-29 PROCEDURE — 99999 PR PBB SHADOW E&M-EST. PATIENT-LVL III: ICD-10-PCS | Mod: PBBFAC,,, | Performed by: ORTHOPAEDIC SURGERY

## 2023-09-29 PROCEDURE — 99213 OFFICE O/P EST LOW 20 MIN: CPT | Mod: PBBFAC | Performed by: ORTHOPAEDIC SURGERY

## 2023-09-29 PROCEDURE — 72100 X-RAY EXAM L-S SPINE 2/3 VWS: CPT | Mod: TC

## 2023-09-29 PROCEDURE — 99213 PR OFFICE/OUTPT VISIT, EST, LEVL III, 20-29 MIN: ICD-10-PCS | Mod: S$PBB,,, | Performed by: ORTHOPAEDIC SURGERY

## 2023-09-29 PROCEDURE — 72100 X-RAY EXAM L-S SPINE 2/3 VWS: CPT | Mod: 26,,, | Performed by: RADIOLOGY

## 2023-09-29 PROCEDURE — 99999 PR PBB SHADOW E&M-EST. PATIENT-LVL III: CPT | Mod: PBBFAC,,, | Performed by: ORTHOPAEDIC SURGERY

## 2023-09-29 PROCEDURE — 72100 XR LUMBAR SPINE AP AND LATERAL: ICD-10-PCS | Mod: 26,,, | Performed by: RADIOLOGY

## 2023-09-29 RX ORDER — MELOXICAM 15 MG/1
15 TABLET ORAL DAILY
Qty: 30 TABLET | Refills: 1 | Status: SHIPPED | OUTPATIENT
Start: 2023-09-29 | End: 2023-12-20 | Stop reason: DRUGHIGH

## 2023-09-29 NOTE — PROGRESS NOTES
The patient returns for follow-up.  She has a history of a prior lumbar diskectomy, and lumbar spinal fusion complicated by infection in 2018.  She reports pain in her low back is stable and has not changed since her last visit.  She is using Mobic and Pennsaid for pain relief with good results.     On physical examination she has no isolated neurologic deficits.    Today I reviewed recent radiographs as well as an     Xray with L4-5 TLIF with hardware unchanged from prior Xray in 2020    Prior MRI of the lumbar spine demonstrates status post L4-5 transforaminal lumbar interbody fusion without evidence of hardware complication.     Impression:  Adjacent segment pathology status post L4-5 TLIF    Plan:  She is doing well with her current pain regimen and would like to continue Mobic and Pennsaid, we sent refills for these to her pharmacy.

## 2023-11-09 ENCOUNTER — CLINICAL SUPPORT (OUTPATIENT)
Dept: PRIMARY CARE CLINIC | Facility: CLINIC | Age: 78
End: 2023-11-09
Payer: MEDICARE

## 2023-11-09 DIAGNOSIS — Z23 NEEDS FLU SHOT: Primary | ICD-10-CM

## 2023-11-09 PROCEDURE — 99999PBSHW FLU VACCINE - QUADRIVALENT - ADJUVANTED: Mod: PBBFAC,,,

## 2023-11-09 PROCEDURE — G0008 ADMIN INFLUENZA VIRUS VAC: HCPCS | Mod: PBBFAC,PN

## 2023-11-09 PROCEDURE — 99999PBSHW FLU VACCINE - QUADRIVALENT - ADJUVANTED: ICD-10-PCS | Mod: PBBFAC,,,

## 2023-11-09 NOTE — PROGRESS NOTES
Time:11:30   Date: 11/09/2023  Product:  Route:Deltoid Muscle Lt  VIS given   Lot#[id number]  NDC# [id number]  Expiration:05/30/2024

## 2023-11-10 ENCOUNTER — TELEPHONE (OUTPATIENT)
Dept: OBSTETRICS AND GYNECOLOGY | Facility: CLINIC | Age: 78
End: 2023-11-10
Payer: MEDICARE

## 2023-11-10 ENCOUNTER — OFFICE VISIT (OUTPATIENT)
Dept: OBSTETRICS AND GYNECOLOGY | Facility: CLINIC | Age: 78
End: 2023-11-10
Payer: MEDICARE

## 2023-11-10 VITALS
BODY MASS INDEX: 30.02 KG/M2 | SYSTOLIC BLOOD PRESSURE: 120 MMHG | DIASTOLIC BLOOD PRESSURE: 60 MMHG | WEIGHT: 163.13 LBS | HEIGHT: 62 IN

## 2023-11-10 DIAGNOSIS — M19.90 OSTEOARTHRITIS, UNSPECIFIED OSTEOARTHRITIS TYPE, UNSPECIFIED SITE: ICD-10-CM

## 2023-11-10 DIAGNOSIS — H81.13 BENIGN PAROXYSMAL VERTIGO, BILATERAL: ICD-10-CM

## 2023-11-10 DIAGNOSIS — Z01.419 ENCOUNTER FOR GYNECOLOGICAL EXAMINATION WITHOUT ABNORMAL FINDING: Primary | ICD-10-CM

## 2023-11-10 DIAGNOSIS — F43.10 POSTTRAUMATIC STRESS DISORDER: ICD-10-CM

## 2023-11-10 DIAGNOSIS — Z80.3 FAMILY HISTORY OF BREAST CANCER: ICD-10-CM

## 2023-11-10 DIAGNOSIS — Z98.1 S/P LUMBAR FUSION: ICD-10-CM

## 2023-11-10 DIAGNOSIS — E78.5 HYPERLIPIDEMIA, UNSPECIFIED HYPERLIPIDEMIA TYPE: ICD-10-CM

## 2023-11-10 PROCEDURE — G0101 PR CA SCREEN;PELVIC/BREAST EXAM: ICD-10-PCS | Mod: S$PBB,GZ,, | Performed by: OBSTETRICS & GYNECOLOGY

## 2023-11-10 PROCEDURE — 99999 PR PBB SHADOW E&M-EST. PATIENT-LVL III: ICD-10-PCS | Mod: PBBFAC,,, | Performed by: OBSTETRICS & GYNECOLOGY

## 2023-11-10 PROCEDURE — 99999 PR PBB SHADOW E&M-EST. PATIENT-LVL III: CPT | Mod: PBBFAC,,, | Performed by: OBSTETRICS & GYNECOLOGY

## 2023-11-10 PROCEDURE — 99213 OFFICE O/P EST LOW 20 MIN: CPT | Mod: PBBFAC | Performed by: OBSTETRICS & GYNECOLOGY

## 2023-11-10 PROCEDURE — G0101 CA SCREEN;PELVIC/BREAST EXAM: HCPCS | Mod: S$PBB,GZ,, | Performed by: OBSTETRICS & GYNECOLOGY

## 2023-11-10 NOTE — TELEPHONE ENCOUNTER
----- Message from Heidi Sharma sent at 11/10/2023 10:44 AM CST -----  Regarding: call back  Name of caller:Brandi       What is the requesting detail: pt is requesting a call back to find out if she can come in earlier.Please advise.       Can the clinic reply by [facility]:       What number to call back: [phone]

## 2023-11-10 NOTE — TELEPHONE ENCOUNTER
Pt was calling to see if she can come in early for her apt. Pt was told that she can come in for 1 pm for her apt.

## 2023-11-17 ENCOUNTER — PATIENT MESSAGE (OUTPATIENT)
Dept: ADMINISTRATIVE | Facility: HOSPITAL | Age: 78
End: 2023-11-17
Payer: MEDICARE

## 2023-11-26 NOTE — PROGRESS NOTES
HISTORY OF PRESENT ILLNESS:    Brandi Manuel is a 78 y.o. female,   presents today for her routine visit.   Hot flashes improved on Paxil.     A full discussion of the benefit-risk ratio of hormonal replacement therapy was carried out. Improvement in vasomotor and other climacteric symptoms is discussed, including possible improvements in sleep and mood. Reduction of risk for osteoporosis was explained. We discussed the study data showing increased risk of thrombo-embolic events such as myocardial infarction, stroke and also possibly breast cancer with estrogen replacement, and how this might affect her. The range of side effects such as breast tenderness, weight gain and including possible increases in lifetime risk of breast cancer and possible thrombotic complications was discussed. We also discussed ACOG's recommendation to use hormone replacement therapy for the relief of hot flashes alone and to be on the lowest dose possible for the shortest amount of time.  Alternative such as herbal and soy-based products were reviewed. All of her questions about this therapy were answered.    History reviewed. No pertinent past medical history.    Past Surgical History:   Procedure Laterality Date    BREAST BIOPSY      HYSTERECTOMY      SHOULDER SURGERY      TONSILLECTOMY       MEDICATIONS AND ALLERGIES:    Current Outpatient Medications:     acetaminophen (TYLENOL) 650 MG TbSR, Take 1,300 mg by mouth once daily., Disp: , Rfl:     amLODIPine (NORVASC) 5 MG tablet, Take 1 tablet (5 mg total) by mouth once daily. High blood pressure, Disp: 90 tablet, Rfl: 3    amLODIPine (NORVASC) 5 MG tablet, Take 1 tablet (5 mg total) by mouth once daily., Disp: 10 tablet, Rfl: 0    bimatoprost (LATISSE) 0.03 % ophthalmic solution, Place one drop on applicator and apply evenly along the skin of the upper eyelid at base of eyelashes once daily at bedtime; repeat procedure for second eye (use a clean applicator)., Disp: 5 mL, Rfl:  4    diclofenac sodium 2 % SoPk, Apply 40 mg topically 2 (two) times a day., Disp: 1 packet, Rfl: 1    diclofenac sodium 2 % SoPk, Apply 1 each topically every 12 (twelve) hours., Disp: 1 packet, Rfl: 6    meclizine (ANTIVERT) 25 mg tablet, Take 1 tablet (25 mg total) by mouth 2 (two) times daily as needed for Dizziness., Disp: 90 tablet, Rfl: 0    meloxicam (MOBIC) 15 MG tablet, Take 7.5 mg by mouth once daily., Disp: , Rfl:     meloxicam (MOBIC) 15 MG tablet, Take 1 tablet (15 mg total) by mouth once daily., Disp: 30 tablet, Rfl: 1    mirtazapine (REMERON) 7.5 MG Tab, Take 7.5 mg by mouth every evening., Disp: , Rfl:     ondansetron (ZOFRAN-ODT) 4 MG TbDL, Take 1 tablet (4 mg total) by mouth every 8 (eight) hours as needed (nausea / vomiting)., Disp: 20 tablet, Rfl: 0    paroxetine (PAXIL) 10 MG tablet, Take 1 tablet (10 mg total) by mouth every morning. Hot flashes, Disp: 90 tablet, Rfl: 3    triamcinolone acetonide 0.1% (KENALOG) 0.1 % ointment, Apply topically 3 (three) times daily., Disp: 453.6 g, Rfl: 3    zolpidem (AMBIEN CR) 12.5 MG CR tablet, Take 12.5 mg by mouth., Disp: , Rfl:     Review of patient's allergies indicates:   Allergen Reactions    Codeine     Hydrocodone-acetaminophen Nausea Only       Family History   Problem Relation Age of Onset    Breast cancer Daughter        Social History     Socioeconomic History    Marital status:    Tobacco Use    Smoking status: Never     Passive exposure: Never    Smokeless tobacco: Never   Substance and Sexual Activity    Alcohol use: No     COMPREHENSIVE GYN HISTORY:  PAP History: Denies abnormal Paps.  Infection History: Denies STDs. Denies PID.  Benign History: Denies uterine fibroids. Denies ovarian cysts. Denies endometriosis. Denies other conditions.  Cancer History: Denies cervical cancer. Denies uterine cancer or hyperplasia. Denies ovarian cancer. Denies vulvar cancer or pre-cancer. Denies vaginal cancer or pre-cancer. Denies breast cancer.  "Denies colon cancer.  Sexual Activity History: Reports currently being sexually active  Menstrual History: Denies menses. Pt is  not on ERT.     ROS:  GENERAL: No weight changes. No swelling. No fatigue. No fever.  CARDIOVASCULAR: No chest pain. No shortness of breath. No leg cramps.   NEUROLOGICAL: No headaches. No vision changes.  BREASTS: No pain. No lumps. No discharge.  ABDOMEN: No pain. No nausea. No vomiting. No diarrhea. No constipation.  REPRODUCTIVE: No abnormal bleeding.  VULVA: No pain. No lesions. No itching.  VAGINA: No relaxation. No itching. No odor. No discharge. No lesions.  URINARY: No incontinence. No nocturia. No frequency. No dysuria.    /60   Ht 5' 2" (1.575 m)   Wt 74 kg (163 lb 2.3 oz)   BMI 29.84 kg/m²     PE:  APPEARANCE: Well nourished, well developed, in no acute distress.  AFFECT: WNL, alert and oriented x 3.  SKIN: No acne or hirsutism.  NECK: Neck symmetric without masses or thyromegaly.  NODES: No inguinal, cervical, axillary or femoral lymph node enlargement.  CHEST: Good respiratory effort.   ABDOMEN: Soft. No tenderness or masses. No hepatosplenomegaly. No hernias.  BREASTS: Symmetrical, no skin changes or visible lesions. No palpable masses, nipple discharge bilaterally.  PELVIC: ATROPHIC EXTERNAL FEMALE GENITALIA without lesions. Normal hair distribution. Adequate perineal body, normal urethral meatus. VAGINA DRY without lesions or discharge. No significant cystocele or rectocele. Bimanual exam shows CERVIX and UTERUS to be SURGICALLY ABSENT. Adnexa without masses or tenderness.  EXTREMITIES: No edema.    DIAGNOSIS:  1. Encounter for gynecological examination without abnormal finding    2. Posttraumatic stress disorder    3. Hyperlipidemia, unspecified hyperlipidemia type    4. S/P lumbar fusion    5. Benign paroxysmal vertigo, bilateral    6. Family history of breast cancer    7. Osteoarthritis, unspecified osteoarthritis type, unspecified site      COUNSELING:  The " patient was counseled today on  -osteoporosis prevention, calcium supplementation, regular weight bearing exercise.  -ACS PAP guidelines (no paps), with recommendations for yearly pelvic exams as her uterus and cervix were removed for benign reasons and ovaries remain;  -recommendation for yearly mammogram;  -to see her primary care physician for all other health maintenance.    FOLLOW-UP with me for next routine visit.

## 2023-12-18 DIAGNOSIS — Z41.1 ENCOUNTER FOR COSMETIC PROCEDURE: ICD-10-CM

## 2023-12-18 NOTE — TELEPHONE ENCOUNTER
----- Message from Judie De La Rosa sent at 12/18/2023  9:50 AM CST -----  Contact: Self/ [phone]  Requesting an RX refill or new RX.  Is this a refill or new RX: New  RX name and strength :  bimatoprost (LATISSE) 0.03 % ophthalmic solution  Is this a 30 day or 90 day RX:   Pharmacy name and phone # :  [facility] #[id number] - [address] [address]  Phone: [phone] Fax: [phone]       The doctors have asked that we provide their patients with the following 2 reminders -- prescription refills can take up to 72 hours, and a friendly reminder that in the future you can use your [facility] account to request refills:

## 2023-12-19 RX ORDER — BIMATOPROST 3 UG/ML
SOLUTION TOPICAL
Qty: 5 ML | Refills: 1 | Status: SHIPPED | OUTPATIENT
Start: 2023-12-19 | End: 2023-12-27 | Stop reason: SDUPTHER

## 2023-12-20 ENCOUNTER — OFFICE VISIT (OUTPATIENT)
Dept: SLEEP MEDICINE | Facility: CLINIC | Age: 78
End: 2023-12-20
Payer: MEDICARE

## 2023-12-20 VITALS — WEIGHT: 163.13 LBS | BODY MASS INDEX: 30.02 KG/M2 | HEIGHT: 62 IN

## 2023-12-20 DIAGNOSIS — G47.33 OSA (OBSTRUCTIVE SLEEP APNEA): Primary | ICD-10-CM

## 2023-12-20 DIAGNOSIS — G47.33 OBSTRUCTIVE SLEEP APNEA SYNDROME: ICD-10-CM

## 2023-12-20 DIAGNOSIS — G47.00 INSOMNIA, UNSPECIFIED TYPE: Primary | ICD-10-CM

## 2023-12-20 PROCEDURE — 99214 PR OFFICE/OUTPT VISIT, EST, LEVL IV, 30-39 MIN: ICD-10-PCS | Mod: S$PBB,,, | Performed by: NURSE PRACTITIONER

## 2023-12-20 PROCEDURE — 99214 OFFICE O/P EST MOD 30 MIN: CPT | Mod: S$PBB,,, | Performed by: NURSE PRACTITIONER

## 2023-12-20 PROCEDURE — 99999 PR PBB SHADOW E&M-EST. PATIENT-LVL III: ICD-10-PCS | Mod: PBBFAC,,, | Performed by: NURSE PRACTITIONER

## 2023-12-20 PROCEDURE — 99213 OFFICE O/P EST LOW 20 MIN: CPT | Mod: PBBFAC | Performed by: NURSE PRACTITIONER

## 2023-12-20 PROCEDURE — 99999 PR PBB SHADOW E&M-EST. PATIENT-LVL III: CPT | Mod: PBBFAC,,, | Performed by: NURSE PRACTITIONER

## 2023-12-20 NOTE — PROGRESS NOTES
Cc:CATERINA/insomnia    Had PSG since seen and setup apap 5-11cm. Doing well. Snoring resolved. Had appt to see me sooner but had vertigo attack. Hot flashes aggravate mask on at times/warm, on paxil. Likes portillo fx tomy mask. She is getting more sleep.   Chronic pain so in bed often early evening time. Safely takes ambien cr    Remote 60davg 7:18h/n AHI 1.5, 90% tile 10cm        Hx 12/2022: She has never had a sleep study. Had insomnia since early 1970's,tried several meds ineffective. Seeing outside psychiatrist. If doesn't take her meds she can't function the next day. She will feel anxious if for instance on a trip and can't find her medication. She has tried to stop cold turkey and may get hot/cold sensation or nausea. Recently cut CR ambien tab and also took 2-3 Melatonin and didn't sleep. She is active during daytime. Attends back PT 3x/week and now vertigo therapy. BP can be high in office but on rechecks its normal. Sleep remains disrupted despite medications. +snores and  has concern that she may have CATERINA ?witnessed apneic pauses.     SH: . Retired [employer] /disabled ( CATERINA, him coming to bed late applying mask, may disrupt her sleep)    HX 4 back surgeries, 2 ES tylenol and mobic 1/2 tab qd     PSG 12/2022 AHI 9/low sat 70%    ASSESSMENT:   1. Insomnia, stable long-term med  2. CATERINA,mild. She is adherent with PAP, AHI<5./ Benefiting fromtherapy      PLAN:  Continue Behavioral Modification: notify me if wish to attend CBT-I /referral    continue ambien CR 12.5mg as prescribed and ADJUST today 5-12cm. /continue qhs use  Rtc 1 yr, sooner if needed

## 2023-12-27 DIAGNOSIS — Z41.1 ENCOUNTER FOR COSMETIC PROCEDURE: ICD-10-CM

## 2023-12-27 RX ORDER — BIMATOPROST 3 UG/ML
SOLUTION TOPICAL
Qty: 5 ML | Refills: 0 | Status: SHIPPED | OUTPATIENT
Start: 2023-12-27 | End: 2024-04-03 | Stop reason: SDUPTHER

## 2023-12-27 NOTE — TELEPHONE ENCOUNTER
----- Message from Carolynn Bermudez MA sent at 12/26/2023  3:55 PM CST -----  Contact: [phone]    ----- Message -----  From: Ragini Deras  Sent: 12/26/2023   2:44 PM CST  To: [facility] Staff    [email or web address]     Patient is calling for Medical Advice regarding:speak to the nurse     How long has patient had these symptoms:    Pharmacy name and phone#:      [facility] #[id number] - [address] AT [address] [address]  Phone: [phone] Fax: [phone]         Would like response via Cylancet:  no     Comments:  Pt is calling she states she is needing to speak to the nurse in regards to the eye solution meds by mail does not have the prescription she is needing this to go to the listed pharmacy     bimatoprost (LATISSE) 0.03 % ophthalmic solution

## 2023-12-28 ENCOUNTER — TELEPHONE (OUTPATIENT)
Dept: OBSTETRICS AND GYNECOLOGY | Facility: CLINIC | Age: 78
End: 2023-12-28
Payer: MEDICARE

## 2024-01-29 DIAGNOSIS — R23.2 HOT FLASHES: ICD-10-CM

## 2024-01-29 RX ORDER — PAROXETINE 10 MG/1
10 TABLET, FILM COATED ORAL EVERY MORNING
Qty: 90 TABLET | Refills: 3 | Status: CANCELLED | OUTPATIENT
Start: 2024-01-29 | End: 2025-01-28

## 2024-01-31 ENCOUNTER — PATIENT MESSAGE (OUTPATIENT)
Dept: OBSTETRICS AND GYNECOLOGY | Facility: CLINIC | Age: 79
End: 2024-01-31
Payer: MEDICARE

## 2024-02-12 ENCOUNTER — PATIENT OUTREACH (OUTPATIENT)
Dept: ADMINISTRATIVE | Facility: HOSPITAL | Age: 79
End: 2024-02-12
Payer: MEDICARE

## 2024-02-12 DIAGNOSIS — Z78.0 POSTMENOPAUSAL: Primary | ICD-10-CM

## 2024-02-12 NOTE — PROGRESS NOTES
Health Maintenance Due   Topic Date Due    Shingles Vaccine (1 of 2) Never done    RSV Vaccine (Age 60+ and Pregnant patients) (1 - 1-dose 60+ series) Never done    Pneumococcal Vaccines (Age 65+) (2 of 2 - PCV) 01/01/2021    DEXA Scan  02/24/2024      - [facility] to contact the office to schedule DEXA, and get a remote B/P .

## 2024-02-15 ENCOUNTER — PATIENT OUTREACH (OUTPATIENT)
Dept: ADMINISTRATIVE | Facility: HOSPITAL | Age: 79
End: 2024-02-15
Payer: MEDICARE

## 2024-02-19 ENCOUNTER — OFFICE VISIT (OUTPATIENT)
Dept: PRIMARY CARE CLINIC | Facility: CLINIC | Age: 79
End: 2024-02-19
Payer: MEDICARE

## 2024-02-19 VITALS
RESPIRATION RATE: 16 BRPM | WEIGHT: 168.31 LBS | HEIGHT: 62 IN | TEMPERATURE: 98 F | HEART RATE: 62 BPM | OXYGEN SATURATION: 100 % | DIASTOLIC BLOOD PRESSURE: 62 MMHG | SYSTOLIC BLOOD PRESSURE: 140 MMHG | BODY MASS INDEX: 30.97 KG/M2

## 2024-02-19 DIAGNOSIS — Z13.220 ENCOUNTER FOR LIPID SCREENING FOR CARDIOVASCULAR DISEASE: ICD-10-CM

## 2024-02-19 DIAGNOSIS — Z13.6 ENCOUNTER FOR LIPID SCREENING FOR CARDIOVASCULAR DISEASE: ICD-10-CM

## 2024-02-19 DIAGNOSIS — M51.36 DDD (DEGENERATIVE DISC DISEASE), LUMBAR: ICD-10-CM

## 2024-02-19 DIAGNOSIS — I10 ESSENTIAL HYPERTENSION: ICD-10-CM

## 2024-02-19 DIAGNOSIS — Z98.1 S/P LUMBAR FUSION: Primary | ICD-10-CM

## 2024-02-19 DIAGNOSIS — R23.2 HOT FLASHES: ICD-10-CM

## 2024-02-19 DIAGNOSIS — M54.16 LUMBAR RADICULOPATHY: ICD-10-CM

## 2024-02-19 DIAGNOSIS — R73.03 PREDIABETES: ICD-10-CM

## 2024-02-19 PROCEDURE — 99214 OFFICE O/P EST MOD 30 MIN: CPT | Mod: S$PBB,,, | Performed by: FAMILY MEDICINE

## 2024-02-19 PROCEDURE — 99215 OFFICE O/P EST HI 40 MIN: CPT | Mod: PBBFAC,PN | Performed by: FAMILY MEDICINE

## 2024-02-19 PROCEDURE — 99999 PR PBB SHADOW E&M-EST. PATIENT-LVL V: CPT | Mod: PBBFAC,,, | Performed by: FAMILY MEDICINE

## 2024-02-19 RX ORDER — PAROXETINE 10 MG/1
10 TABLET, FILM COATED ORAL EVERY MORNING
Qty: 90 TABLET | Refills: 3 | Status: SHIPPED | OUTPATIENT
Start: 2024-02-19 | End: 2025-02-18

## 2024-02-19 NOTE — PROGRESS NOTES
Clinic Note  2/19/2024      Subjective:       Patient ID:  Brandi is a 78 y.o. female being seen for an established visit.    Chief Complaint: Follow-up    Hypertension-usually takes her amlodipine at 10:00 a.m., did not take her amlodipine this morning yet    Hot flashes-in the past was on Premarin, was prescribed Paxil for her hot flashes which do help some.  However, her pharmacy stopped sending the Paxil due to concern about medication interaction.    Sleep-is prescribed Ambien 12.5 mg q.h.s..  Takes Remeron p.r.n..    Back pain-takes meloxicam 7.5 mg q.d..    Dizziness-takes meclizine very sparingly    Weight gain-patient reports gaining weight, had done some intermittent fasting in the past.  Just started exercising on treadmill/bike today.    Review of Systems   Constitutional:  Negative for chills, fever, malaise/fatigue and weight loss.   HENT:  Negative for congestion, sinus pain and sore throat.    Respiratory:  Negative for cough, shortness of breath and wheezing.    Cardiovascular:  Negative for chest pain and palpitations.   Gastrointestinal:  Negative for constipation, diarrhea, nausea and vomiting.   Genitourinary:  Negative for dysuria, frequency and urgency.   Musculoskeletal:  Positive for back pain. Negative for myalgias.   Skin:  Negative for rash.   Neurological:  Negative for headaches.       Medication List with Changes/Refills   Current Medications    ACETAMINOPHEN (TYLENOL) 650 MG TBSR    Take 1,300 mg by mouth once daily.    AMLODIPINE (NORVASC) 5 MG TABLET    Take 1 tablet (5 mg total) by mouth once daily. High blood pressure    BIMATOPROST (LATISSE) 0.03 % OPHTHALMIC SOLUTION    Place one drop on applicator and apply evenly along the skin of the upper eyelid at base of eyelashes once daily at bedtime; repeat procedure for second eye (use a clean applicator).    DICLOFENAC SODIUM 2 % SOPK    Apply 40 mg topically 2 (two) times a day.    DICLOFENAC SODIUM 2 % SOPK    Apply 1 each topically  every 12 (twelve) hours.    MECLIZINE (ANTIVERT) 25 MG TABLET    Take 1 tablet (25 mg total) by mouth 2 (two) times daily as needed for Dizziness.    MELOXICAM (MOBIC) 15 MG TABLET    Take 7.5 mg by mouth once daily.    MIRTAZAPINE (REMERON) 7.5 MG TAB    Take 7.5 mg by mouth nightly as needed.    ONDANSETRON (ZOFRAN-ODT) 4 MG TBDL    Take 1 tablet (4 mg total) by mouth every 8 (eight) hours as needed (nausea / vomiting).    TRIAMCINOLONE ACETONIDE 0.1% (KENALOG) 0.1 % OINTMENT    Apply topically 3 (three) times daily.    ZOLPIDEM (AMBIEN CR) 12.5 MG CR TABLET    Take 12.5 mg by mouth.   Changed and/or Refilled Medications    Modified Medication Previous Medication    PAROXETINE (PAXIL) 10 MG TABLET paroxetine (PAXIL) 10 MG tablet       Take 1 tablet (10 mg total) by mouth every morning. Hot flashes    Take 1 tablet (10 mg total) by mouth every morning. Hot flashes   Discontinued Medications    AMLODIPINE (NORVASC) 5 MG TABLET    Take 1 tablet (5 mg total) by mouth once daily.       Patient Active Problem List   Diagnosis    Benign paroxysmal vertigo, bilateral    Dermatitis    Generalized muscle weakness    Hearing loss    Hot flash, menopausal    Hyperlipidemia    IFG (impaired fasting glucose)    Insomnia    Chronic bilateral low back pain without sciatica    Lumbar stenosis with neurogenic claudication    Osteoarthritis    Other depressive disorder    Posttraumatic stress disorder    Pure hypercholesterolemia, unspecified    Right leg paresthesias    Sciatica    Spondylolisthesis at L4-L5 level    DDD (degenerative disc disease), lumbar    Lumbar radiculopathy    S/P lumbar fusion    Obesity (BMI 30-39.9)    Hot flash not due to menopause    Family history of colon cancer    Family history of breast cancer    COVID    Skin spots-aging    Sleep apnea    Weakness of both lower extremities           Objective:      BP (!) 140/62 (BP Location: Right arm, Patient Position: Sitting, BP Method: Medium (Automatic))    "Pulse 62   Temp 98.3 °F (36.8 °C)   Resp 16   Ht 5' 2.4" (1.585 m)   Wt 76.4 kg (168 lb 5.1 oz)   SpO2 100%   BMI 30.39 kg/m²   Estimated body mass index is 30.39 kg/m² as calculated from the following:    Height as of this encounter: 5' 2.4" (1.585 m).    Weight as of this encounter: 76.4 kg (168 lb 5.1 oz).  Physical Exam  Vitals reviewed.   Constitutional:       General: She is not in acute distress.     Appearance: She is not diaphoretic.   HENT:      Head: Normocephalic and atraumatic.   Eyes:      Conjunctiva/sclera: Conjunctivae normal.   Cardiovascular:      Rate and Rhythm: Normal rate and regular rhythm.      Heart sounds: Normal heart sounds.   Pulmonary:      Effort: Pulmonary effort is normal. No respiratory distress.      Breath sounds: Normal breath sounds. No wheezing.   Abdominal:      General: Bowel sounds are normal.      Palpations: Abdomen is soft.   Musculoskeletal:         General: Normal range of motion.      Cervical back: Normal range of motion.   Skin:     General: Skin is warm and dry.      Findings: No erythema or rash.   Neurological:      Mental Status: She is alert and oriented to person, place, and time.   Psychiatric:         Mood and Affect: Mood and affect normal.         Behavior: Behavior normal.         Thought Content: Thought content normal.         Judgment: Judgment normal.           Assessment and Plan:     1. Hot flashes  - paroxetine (PAXIL) 10 MG tablet; Take 1 tablet (10 mg total) by mouth every morning. Hot flashes  Dispense: 90 tablet; Refill: 3  - medication reconciliation completed today, no medication interactions with Paxil.  Discussed that weight gain is likely from SSRI  - CBC Auto Differential; Future    2. S/P lumbar fusion  - for to physical therapy for back pain  - Ambulatory referral/consult to Physical/Occupational Therapy; Future    3. Lumbar radiculopathy  - Ambulatory referral/consult to Physical/Occupational Therapy; Future    4. DDD " (degenerative disc disease), lumbar  - Ambulatory referral/consult to Physical/Occupational Therapy; Future    5. Essential hypertension  - blood pressure not controlled today however patient did not take her medication, patient to come back in 2 weeks for nursing blood pressure check.  Continue amlodipine 5 mg  - CBC Auto Differential; Future  - Comprehensive Metabolic Panel; Future    6. Encounter for lipid screening for cardiovascular disease  - Lipid Panel; Future    7. Prediabetes  - Hemoglobin A1C; Future      Follow Up:   Follow up for 2 wk nursing BP visit, follow-up in 6 months for annual exam with labs prior.    Other Orders Placed This Visit:  Orders Placed This Encounter   Procedures    CBC Auto Differential    Comprehensive Metabolic Panel    Lipid Panel    Hemoglobin A1C    Ambulatory referral/consult to Physical/Occupational Therapy         Hao Chau MD        This note is dictated on M*Modal word recognition program.  There are word recognition mistakes that are occasionally missed on review.

## 2024-02-27 DIAGNOSIS — Z00.00 ENCOUNTER FOR MEDICARE ANNUAL WELLNESS EXAM: ICD-10-CM

## 2024-03-01 ENCOUNTER — TELEPHONE (OUTPATIENT)
Dept: PRIMARY CARE CLINIC | Facility: CLINIC | Age: 79
End: 2024-03-01
Payer: MEDICARE

## 2024-03-01 NOTE — TELEPHONE ENCOUNTER
----- Message from Julia Castelan sent at 3/1/2024  2:44 PM CST -----  Contact: Pt [phone]  Pt is calling to see if she can reschedule her nurse visit on 3/4/24 to 2 pm.    Please Call and advise    Thank you    Please do NOT rep[ly to sender as this is from the call center and they answer incoming calls only.

## 2024-03-04 ENCOUNTER — CLINICAL SUPPORT (OUTPATIENT)
Dept: PRIMARY CARE CLINIC | Facility: CLINIC | Age: 79
End: 2024-03-04
Payer: MEDICARE

## 2024-03-04 VITALS — DIASTOLIC BLOOD PRESSURE: 64 MMHG | SYSTOLIC BLOOD PRESSURE: 135 MMHG | HEART RATE: 67 BPM

## 2024-03-04 DIAGNOSIS — Z01.30 BLOOD PRESSURE CHECK: Primary | ICD-10-CM

## 2024-03-04 PROCEDURE — 99212 OFFICE O/P EST SF 10 MIN: CPT | Mod: PBBFAC,PN

## 2024-03-04 PROCEDURE — 99999 PR PBB SHADOW E&M-EST. PATIENT-LVL II: CPT | Mod: PBBFAC,,,

## 2024-03-04 NOTE — PROGRESS NOTES
Brandi DANN Manuel 78 y.o. female is here today for Blood Pressure check.   History of HTN yes.    Review of patient's allergies indicates:   Allergen Reactions    Codeine     Hydrocodone-acetaminophen Nausea Only     Creatinine   Date Value Ref Range Status   08/21/2023 1.2 0.5 - 1.4 mg/dL Final     Sodium   Date Value Ref Range Status   08/21/2023 140 136 - 145 mmol/L Final     Potassium   Date Value Ref Range Status   08/21/2023 4.6 3.5 - 5.1 mmol/L Final   ]  Patient verifies taking blood pressure medications on a regular basis at the same time of the day.     Current Outpatient Medications:     acetaminophen (TYLENOL) 650 MG TbSR, Take 1,300 mg by mouth once daily., Disp: , Rfl:     amLODIPine (NORVASC) 5 MG tablet, Take 1 tablet (5 mg total) by mouth once daily. High blood pressure, Disp: 90 tablet, Rfl: 3    bimatoprost (LATISSE) 0.03 % ophthalmic solution, Place one drop on applicator and apply evenly along the skin of the upper eyelid at base of eyelashes once daily at bedtime; repeat procedure for second eye (use a clean applicator)., Disp: 5 mL, Rfl: 0    diclofenac sodium 2 % SoPk, Apply 40 mg topically 2 (two) times a day., Disp: 1 packet, Rfl: 1    diclofenac sodium 2 % SoPk, Apply 1 each topically every 12 (twelve) hours., Disp: 1 packet, Rfl: 6    meclizine (ANTIVERT) 25 mg tablet, Take 1 tablet (25 mg total) by mouth 2 (two) times daily as needed for Dizziness., Disp: 90 tablet, Rfl: 0    meloxicam (MOBIC) 15 MG tablet, Take 7.5 mg by mouth once daily., Disp: , Rfl:     mirtazapine (REMERON) 7.5 MG Tab, Take 7.5 mg by mouth nightly as needed., Disp: , Rfl:     ondansetron (ZOFRAN-ODT) 4 MG TbDL, Take 1 tablet (4 mg total) by mouth every 8 (eight) hours as needed (nausea / vomiting)., Disp: 20 tablet, Rfl: 0    paroxetine (PAXIL) 10 MG tablet, Take 1 tablet (10 mg total) by mouth every morning. Hot flashes, Disp: 90 tablet, Rfl: 3    triamcinolone acetonide 0.1% (KENALOG) 0.1 % ointment, Apply topically  3 (three) times daily., Disp: 453.6 g, Rfl: 3    zolpidem (AMBIEN CR) 12.5 MG CR tablet, Take 12.5 mg by mouth., Disp: , Rfl:   Does patient have record of home blood pressure readings yes. Readings have been averaging wnl.   Last dose of blood pressure medication was taken at this morning at 10 a,m.  Patient is asymptomatic.   Complains of nothing at this visit,     Vitals:    03/04/24 1434 03/04/24 1435   BP: (!) 149/67 135/64   Pulse: 71 67         Dr. Chau  informed of nurse visit.

## 2024-04-03 ENCOUNTER — PATIENT MESSAGE (OUTPATIENT)
Dept: ADMINISTRATIVE | Facility: HOSPITAL | Age: 79
End: 2024-04-03
Payer: MEDICARE

## 2024-04-03 ENCOUNTER — OFFICE VISIT (OUTPATIENT)
Dept: DERMATOLOGY | Facility: CLINIC | Age: 79
End: 2024-04-03
Payer: MEDICARE

## 2024-04-03 DIAGNOSIS — B07.9 VIRAL WARTS, UNSPECIFIED TYPE: Primary | ICD-10-CM

## 2024-04-03 DIAGNOSIS — Z41.1 ENCOUNTER FOR COSMETIC PROCEDURE: ICD-10-CM

## 2024-04-03 PROCEDURE — 99999 PR PBB SHADOW E&M-EST. PATIENT-LVL III: CPT | Mod: PBBFAC,,, | Performed by: DERMATOLOGY

## 2024-04-03 PROCEDURE — 99213 OFFICE O/P EST LOW 20 MIN: CPT | Mod: PBBFAC,PN,25 | Performed by: DERMATOLOGY

## 2024-04-03 PROCEDURE — 17110 DESTRUCTION B9 LES UP TO 14: CPT | Mod: S$PBB,,, | Performed by: DERMATOLOGY

## 2024-04-03 PROCEDURE — 99212 OFFICE O/P EST SF 10 MIN: CPT | Mod: 25,S$PBB,, | Performed by: DERMATOLOGY

## 2024-04-03 PROCEDURE — 17110 DESTRUCTION B9 LES UP TO 14: CPT | Mod: PBBFAC,PN | Performed by: DERMATOLOGY

## 2024-04-03 RX ORDER — BIMATOPROST 3 UG/ML
SOLUTION TOPICAL
Qty: 5 ML | Refills: 6 | Status: SHIPPED | OUTPATIENT
Start: 2024-04-03

## 2024-04-03 NOTE — PATIENT INSTRUCTIONS

## 2024-04-04 ENCOUNTER — TELEPHONE (OUTPATIENT)
Dept: PRIMARY CARE CLINIC | Facility: CLINIC | Age: 79
End: 2024-04-04
Payer: MEDICARE

## 2024-04-04 ENCOUNTER — OFFICE VISIT (OUTPATIENT)
Dept: PRIMARY CARE CLINIC | Facility: CLINIC | Age: 79
End: 2024-04-04
Payer: MEDICARE

## 2024-04-04 VITALS
HEIGHT: 62 IN | OXYGEN SATURATION: 98 % | SYSTOLIC BLOOD PRESSURE: 142 MMHG | DIASTOLIC BLOOD PRESSURE: 77 MMHG | BODY MASS INDEX: 30.26 KG/M2 | HEART RATE: 59 BPM | WEIGHT: 164.44 LBS

## 2024-04-04 DIAGNOSIS — Z00.00 ENCOUNTER FOR PREVENTIVE HEALTH EXAMINATION: Primary | ICD-10-CM

## 2024-04-04 DIAGNOSIS — E78.5 HYPERLIPIDEMIA, UNSPECIFIED HYPERLIPIDEMIA TYPE: ICD-10-CM

## 2024-04-04 DIAGNOSIS — N18.31 CHRONIC KIDNEY DISEASE, STAGE 3A: ICD-10-CM

## 2024-04-04 DIAGNOSIS — M54.50 CHRONIC BILATERAL LOW BACK PAIN WITHOUT SCIATICA: ICD-10-CM

## 2024-04-04 DIAGNOSIS — G89.29 CHRONIC BILATERAL LOW BACK PAIN WITHOUT SCIATICA: ICD-10-CM

## 2024-04-04 DIAGNOSIS — H81.13 BENIGN PAROXYSMAL VERTIGO, BILATERAL: ICD-10-CM

## 2024-04-04 PROCEDURE — G0439 PPPS, SUBSEQ VISIT: HCPCS | Mod: ,,,

## 2024-04-04 PROCEDURE — 99999 PR PBB SHADOW E&M-EST. PATIENT-LVL III: CPT | Mod: PBBFAC,,,

## 2024-04-04 PROCEDURE — 99213 OFFICE O/P EST LOW 20 MIN: CPT | Mod: PBBFAC,PN

## 2024-04-04 NOTE — PROGRESS NOTES
"  Brandi Manuel presented for a  Medicare AWV and comprehensive Health Risk Assessment today. She is a patient of Dr. Chau and is new to me.  The following components were reviewed and updated:    Medical history  Family History  Social history  Allergies and Current Medications  Health Risk Assessment  Health Maintenance  Care Team     ** See Completed Assessments for Annual Wellness Visit within the encounter summary.**    The following assessments were completed:  Living Situation  CAGE  Depression Screening  Timed Get Up and Go  Whisper Test  Cognitive Function Screening  Nutrition Screening  ADL Screening  PAQ Screening  Review for opioid screen: pt does not have rx for opioid  Review for substance use disorder:  pt does not use substance        Vitals:    04/04/24 0920   BP: (!) 142/77   BP Location: Right arm   Patient Position: Sitting   Pulse: (!) 59   SpO2: 98%   Weight: 74.6 kg (164 lb 7.4 oz)   Height: 5' 2.4" (1.585 m)     Body mass index is 29.7 kg/m².  Physical Exam  Vitals reviewed.   Constitutional:       Appearance: Normal appearance.   HENT:      Head: Normocephalic and atraumatic.   Cardiovascular:      Rate and Rhythm: Normal rate and regular rhythm.      Pulses: Normal pulses.           Radial pulses are 2+ on the right side and 2+ on the left side.        Dorsalis pedis pulses are 2+ on the right side and 2+ on the left side.        Posterior tibial pulses are 2+ on the right side and 2+ on the left side.      Heart sounds: Normal heart sounds.   Pulmonary:      Effort: Pulmonary effort is normal.      Breath sounds: Normal breath sounds.   Musculoskeletal:      Right lower leg: No edema.      Left lower leg: No edema.   Skin:     General: Skin is warm and dry.      Capillary Refill: Capillary refill takes less than 2 seconds.   Neurological:      General: No focal deficit present.      Mental Status: She is alert and oriented to person, place, and time.   Psychiatric:         Mood and " Affect: Mood normal.         Behavior: Behavior normal.        Diagnoses and health risks identified today and associated recommendations/orders:    1. Encounter for preventive health examination  Assessment and evaluation performed as stated above.    2. Chronic kidney disease, stage 3a  Stable.  EGFR 46.9 on 8/12/24.  Encouraged pt to increase water intake and  avoid nephrotoxic drugs.  Followed by pcp.    3. Benign paroxysmal vertigo, bilateral  Stable with prn use to meclizine.  Followed by pcp.    4. Hyperlipidemia, unspecified hyperlipidemia type  Stable on diet.  Lipid panel WNL on 8/21/23. Followed by pcp    5. BMI 29.0-29.9,adult  Stable/improving. Pt lost 4lbs in past few months. Encouraged pt to continue increasing activity and reduce caloric intake.  Followed by pcp    6. Chronic bilateral low back pain without sciatica  Stable, followed by ortho.      Provided Brandi with a 5-10 year written screening schedule and personal prevention plan. Recommendations were developed using the USPSTF age appropriate recommendations. Education, counseling, and referrals were provided as needed. After Visit Summary printed and given to patient which includes a list of additional screenings\tests needed.    Follow up in about 1 year (around 4/4/2025), or if symptoms worsen or fail to improve.        Ally Randolph NP      Portions of this note may have been generated using voice recognition software.  Please excuse any spelling/grammatical errors. Occasional wrong-word or sound-a-like substitutions may have also occurred due to the inherent limitations of voice recognition software. Please read the chart carefully and recognize, using context, where substitutions have occurred.    I offered to discuss advanced care planning, including how to pick a person who would make decisions for you if you were unable to make them for yourself, called a health care power of , and what kind of decisions you might make  such as use of life sustaining treatments such as ventilators and tube feeding when faced with a life limiting illness recorded on a living will that they will need to know. (How you want to be cared for as you near the end of your natural life)     X Patient is interested in learning more about how to make advanced directives.  I provided them paperwork and offered to discuss this with them.

## 2024-04-04 NOTE — TELEPHONE ENCOUNTER
----- Message from Axel Conway sent at 4/4/2024 11:52 AM CDT -----  Contact: [facility] [phone]  1MEDICALADVICE     Patient is calling for Medical Advice regarding: needs a referral     How long has patient had these symptoms:    Pharmacy name and phone#:    Would like response via Nubefyt:  n/a     Comments:    Fax # [phone] Pt said it should have been faxed in Feb and they don't have it Please send it today and call back

## 2024-04-04 NOTE — PATIENT INSTRUCTIONS
Counseling and Referral of Other Preventative  (Italic type indicates deductible and co-insurance are waived)    Patient Name: Brandi Manuel  Today's Date: 4/4/2024    Health Maintenance       Date Due Completion Date    Shingles Vaccine (1 of 2) Never done ---    RSV Vaccine (Age 60+ and Pregnant patients) (1 - 1-dose 60+ series) Never done ---    Pneumococcal Vaccines (Age 65+) (2 of 2 - PCV) 01/01/2021 1/1/2020    DEXA Scan 02/24/2024 2/24/2021    Hemoglobin A1c (Prediabetes) 08/21/2024 8/21/2023    Lipid Panel 08/21/2028 8/21/2023    TETANUS VACCINE 02/17/2031 2/17/2021        No orders of the defined types were placed in this encounter.    The following information is provided to all patients.  This information is to help you find resources for any of the problems found today that may be affecting your health:                  Living healthy guide: www.[facility].[place].[facility]      Understanding Diabetes: www.diabetes.org      Eating healthy: www.cdc.gov/healthyweight      [facility] home safety checklist: www.cdc.gov/steadi/patient.html      Agency on Aging: www.goea.[place]      Alcoholics anonymous (AA): www.aa.org      Physical Activity: www.tavares.nih.gov/ke2wpnj      Tobacco use: www.quitwithusla.org

## 2024-04-04 NOTE — TELEPHONE ENCOUNTER
----- Message from Julia Castelan sent at 4/4/2024 11:50 AM CDT -----  Contact: Pt [phone]  Pt was calling regarding, an order put in for physical therapy. The order was to go to [facility] pt states. [facility] states to Ms. Manuel that they never received the order. She is calling now to see why it hasn't been sent, what's the breakdown of it being fax to them & wondering if she can get a copy to bring to [facility].    Please Call and advise    Thank you    Please do NOT rep[ly to sender as this is from the call center and they answer incoming calls only.

## 2024-06-26 ENCOUNTER — TELEPHONE (OUTPATIENT)
Dept: OBSTETRICS AND GYNECOLOGY | Facility: CLINIC | Age: 79
End: 2024-06-26
Payer: MEDICARE

## 2024-06-26 DIAGNOSIS — Z12.31 VISIT FOR SCREENING MAMMOGRAM: Primary | ICD-10-CM

## 2024-06-26 NOTE — TELEPHONE ENCOUNTER
----- Message from Bethany Thacker sent at 6/26/2024  9:00 AM CDT -----  Contact: [phone]  Brandi Manuel calling regarding Orders (message) Pt needs an order put in for her to have her annual mammo Pt asking for a call back once order has been put in Pls

## 2024-07-09 ENCOUNTER — PATIENT MESSAGE (OUTPATIENT)
Dept: ADMINISTRATIVE | Facility: HOSPITAL | Age: 79
End: 2024-07-09
Payer: MEDICARE

## 2024-08-28 DIAGNOSIS — I10 ESSENTIAL HYPERTENSION: ICD-10-CM

## 2024-08-28 RX ORDER — AMLODIPINE BESYLATE 5 MG/1
5 TABLET ORAL
Qty: 90 TABLET | Refills: 3 | OUTPATIENT
Start: 2024-08-28

## 2024-08-28 RX ORDER — AMLODIPINE BESYLATE 5 MG/1
5 TABLET ORAL DAILY
Qty: 90 TABLET | Refills: 3 | Status: SHIPPED | OUTPATIENT
Start: 2024-08-28

## 2024-08-28 NOTE — TELEPHONE ENCOUNTER
Care Due:                  Date            Visit Type   Department     Provider  --------------------------------------------------------------------------------                                EP -                              PRIMARY      [facility] PRIMARY  Last Visit: 02-      CARE ([facility])   JOSE HAWK  Next Visit: None Scheduled  None         None Found                                                            Last  Test          Frequency    Reason                     Performed    Due Date  --------------------------------------------------------------------------------    CBC.........  12 months..  diclofenac...............  08- 08-    Cr..........  12 months..  diclofenac...............  08- 08-    [employer] Embedded Care Due Messages. Reference number: [id number].   8/28/2024 2:31:11 PM CDT

## 2024-08-29 ENCOUNTER — TELEPHONE (OUTPATIENT)
Dept: OBSTETRICS AND GYNECOLOGY | Facility: CLINIC | Age: 79
End: 2024-08-29
Payer: MEDICARE

## 2024-08-29 NOTE — TELEPHONE ENCOUNTER
Provider Staff:  Action required for this patient    Requires labs      Please see care gap opportunities below in Care Due Message.    Thanks!  [facility]     Appointments      Date Provider   Last Visit   2/19/2024 Hao Chau MD   Next Visit   Visit date not found Hao Chau MD     Refill Decision Note   Brandi Manuel  is requesting a refill authorization.  Brief Assessment and Rationale for Refill:  Quick Discontinue     Medication Therapy Plan:  Receipt confirmed by pharmacy (8/28/2024  5:55 PM CDT)      Comments:     Note composed:11:06 PM 08/28/2024

## 2024-09-05 ENCOUNTER — TELEPHONE (OUTPATIENT)
Dept: OBSTETRICS AND GYNECOLOGY | Facility: CLINIC | Age: 79
End: 2024-09-05
Payer: MEDICARE

## 2024-09-14 DIAGNOSIS — I10 ESSENTIAL HYPERTENSION: ICD-10-CM

## 2024-09-14 NOTE — TELEPHONE ENCOUNTER
No care due was identified.  [facility] Embedded Care Due Messages. Reference number: [id number].   9/14/2024 6:59:37 PM CDT

## 2024-09-16 ENCOUNTER — TELEPHONE (OUTPATIENT)
Dept: PRIMARY CARE CLINIC | Facility: CLINIC | Age: 79
End: 2024-09-16
Payer: MEDICARE

## 2024-09-16 NOTE — TELEPHONE ENCOUNTER
Pt called and wanting her refill on her BP medication , medication has been sent but the mail service has not sent it out to her.

## 2024-09-18 RX ORDER — AMLODIPINE BESYLATE 5 MG/1
5 TABLET ORAL DAILY
Qty: 90 TABLET | Refills: 1 | Status: SHIPPED | OUTPATIENT
Start: 2024-09-18

## 2024-10-16 ENCOUNTER — PATIENT MESSAGE (OUTPATIENT)
Dept: ADMINISTRATIVE | Facility: HOSPITAL | Age: 79
End: 2024-10-16
Payer: MEDICARE

## 2024-10-30 ENCOUNTER — HOSPITAL ENCOUNTER (OUTPATIENT)
Dept: RADIOLOGY | Facility: HOSPITAL | Age: 79
Discharge: HOME | End: 2024-10-30
Attending: OBSTETRICS & GYNECOLOGY
Payer: MEDICARE

## 2024-10-30 DIAGNOSIS — Z12.31 VISIT FOR SCREENING MAMMOGRAM: ICD-10-CM

## 2024-10-30 PROCEDURE — 77067 SCR MAMMO BI INCL CAD: CPT | Mod: TC

## 2024-10-30 PROCEDURE — 77063 BREAST TOMOSYNTHESIS BI: CPT | Mod: TC

## 2024-11-07 RX ORDER — METRONIDAZOLE 7.5 MG/G
1 GEL VAGINAL DAILY
Qty: 1 G | Refills: 0 | Status: SHIPPED | OUTPATIENT
Start: 2024-11-07 | End: 2024-11-14

## 2024-11-08 ENCOUNTER — TELEPHONE (OUTPATIENT)
Dept: OBSTETRICS AND GYNECOLOGY | Facility: CLINIC | Age: 79
End: 2024-11-08
Payer: MEDICARE

## 2024-11-08 NOTE — TELEPHONE ENCOUNTER
I spoke to the pt and informed her that I put the wrong pharmacy for the medicine and that I called the medicine in to the [facility]'s and they will process the medicine for her.

## 2024-11-10 RX ORDER — METRONIDAZOLE 7.5 MG/G
1 GEL VAGINAL DAILY
Qty: 1 G | Refills: 0 | Status: SHIPPED | OUTPATIENT
Start: 2024-11-10 | End: 2024-11-17

## 2024-11-11 ENCOUNTER — TELEPHONE (OUTPATIENT)
Dept: OBSTETRICS AND GYNECOLOGY | Facility: CLINIC | Age: 79
End: 2024-11-11
Payer: MEDICARE

## 2024-11-12 ENCOUNTER — OFFICE VISIT (OUTPATIENT)
Dept: OBSTETRICS AND GYNECOLOGY | Facility: CLINIC | Age: 79
End: 2024-11-12
Payer: MEDICARE

## 2024-11-12 VITALS
WEIGHT: 163.38 LBS | DIASTOLIC BLOOD PRESSURE: 92 MMHG | SYSTOLIC BLOOD PRESSURE: 154 MMHG | BODY MASS INDEX: 30.07 KG/M2 | HEIGHT: 62 IN

## 2024-11-12 DIAGNOSIS — M19.90 OSTEOARTHRITIS, UNSPECIFIED OSTEOARTHRITIS TYPE, UNSPECIFIED SITE: ICD-10-CM

## 2024-11-12 DIAGNOSIS — G47.00 INSOMNIA, UNSPECIFIED TYPE: ICD-10-CM

## 2024-11-12 DIAGNOSIS — G47.33 OBSTRUCTIVE SLEEP APNEA SYNDROME: ICD-10-CM

## 2024-11-12 DIAGNOSIS — E78.00 PURE HYPERCHOLESTEROLEMIA, UNSPECIFIED: ICD-10-CM

## 2024-11-12 DIAGNOSIS — H81.13 BENIGN PAROXYSMAL VERTIGO, BILATERAL: ICD-10-CM

## 2024-11-12 DIAGNOSIS — Z12.31 VISIT FOR SCREENING MAMMOGRAM: ICD-10-CM

## 2024-11-12 DIAGNOSIS — Z01.419 ENCOUNTER FOR GYNECOLOGICAL EXAMINATION WITHOUT ABNORMAL FINDING: Primary | ICD-10-CM

## 2024-11-12 PROCEDURE — 99999 PR PBB SHADOW E&M-EST. PATIENT-LVL III: CPT | Mod: PBBFAC,,, | Performed by: OBSTETRICS & GYNECOLOGY

## 2024-11-12 PROCEDURE — G0101 CA SCREEN;PELVIC/BREAST EXAM: HCPCS | Mod: S$PBB,GZ,, | Performed by: OBSTETRICS & GYNECOLOGY

## 2024-11-12 PROCEDURE — 99213 OFFICE O/P EST LOW 20 MIN: CPT | Mod: PBBFAC | Performed by: OBSTETRICS & GYNECOLOGY

## 2024-11-12 NOTE — PROGRESS NOTES
HISTORY OF PRESENT ILLNESS:    Brandi Manuel is a 79 y.o. female,   presents today for her routine visit.   Hot flashes improved on Paxil.     A full discussion of the benefit-risk ratio of hormonal replacement therapy was carried out. Improvement in vasomotor and other climacteric symptoms is discussed, including possible improvements in sleep and mood. Reduction of risk for osteoporosis was explained. We discussed the study data showing increased risk of thrombo-embolic events such as myocardial infarction, stroke and also possibly breast cancer with estrogen replacement, and how this might affect her. The range of side effects such as breast tenderness, weight gain and including possible increases in lifetime risk of breast cancer and possible thrombotic complications was discussed. We also discussed ACOG's recommendation to use hormone replacement therapy for the relief of hot flashes alone and to be on the lowest dose possible for the shortest amount of time.  Alternative such as herbal and soy-based products were reviewed. All of her questions about this therapy were answered.    History reviewed. No pertinent past medical history.    Past Surgical History:   Procedure Laterality Date    BREAST BIOPSY      HYSTERECTOMY      SHOULDER SURGERY      TONSILLECTOMY       MEDICATIONS AND ALLERGIES:    Current Outpatient Medications:     acetaminophen (TYLENOL) 650 MG TbSR, Take 1,300 mg by mouth once daily., Disp: , Rfl:     amLODIPine (NORVASC) 5 MG tablet, Take 1 tablet (5 mg total) by mouth once daily. High blood pressure, Disp: 90 tablet, Rfl: 1    bimatoprost (LATISSE) 0.03 % ophthalmic solution, Place one drop on applicator and apply evenly along the skin of the upper eyelid at base of eyelashes once daily at bedtime; repeat procedure for second eye (use a clean applicator)., Disp: 5 mL, Rfl: 6    diclofenac sodium 2 % SoPk, Apply 40 mg topically 2 (two) times a day., Disp: 1 packet, Rfl: 1     diclofenac sodium 2 % SoPk, Apply 1 each topically every 12 (twelve) hours., Disp: 1 packet, Rfl: 6    meclizine (ANTIVERT) 25 mg tablet, Take 1 tablet (25 mg total) by mouth 2 (two) times daily as needed for Dizziness., Disp: 90 tablet, Rfl: 0    meloxicam (MOBIC) 15 MG tablet, Take 7.5 mg by mouth once daily., Disp: , Rfl:     metroNIDAZOLE (METROGEL VAGINAL) 0.75 % (37.5mg/5 gram) vaginal gel, Place 1 applicator vaginally once daily. for 7 days, Disp: 1 g, Rfl: 0    mirtazapine (REMERON) 7.5 MG Tab, Take 7.5 mg by mouth nightly as needed., Disp: , Rfl:     ondansetron (ZOFRAN-ODT) 4 MG TbDL, Take 1 tablet (4 mg total) by mouth every 8 (eight) hours as needed (nausea / vomiting)., Disp: 20 tablet, Rfl: 0    paroxetine (PAXIL) 10 MG tablet, Take 1 tablet (10 mg total) by mouth every morning. Hot flashes, Disp: 90 tablet, Rfl: 3    triamcinolone acetonide 0.1% (KENALOG) 0.1 % ointment, Apply topically 3 (three) times daily., Disp: 453.6 g, Rfl: 3    zolpidem (AMBIEN CR) 12.5 MG CR tablet, Take 12.5 mg by mouth., Disp: , Rfl:     Review of patient's allergies indicates:   Allergen Reactions    Codeine     Hydrocodone-acetaminophen Nausea Only       Family History   Problem Relation Name Age of Onset    Breast cancer Daughter         Social History     Socioeconomic History    Marital status:    Tobacco Use    Smoking status: Never     Passive exposure: Never    Smokeless tobacco: Never   Substance and Sexual Activity    Alcohol use: No    Drug use: Not Currently    Sexual activity: Not Currently     Social Drivers of Health     Food Insecurity: No Food Insecurity (4/4/2024)    Hunger Vital Sign     Worried About Running Out of Food in the Last Year: Never true     Ran Out of Food in the Last Year: Never true   Transportation Needs: No Transportation Needs (4/4/2024)    PRAPARE - Transportation     Lack of Transportation (Medical): No     Lack of Transportation (Non-Medical): No   Physical Activity:  "Insufficiently Active (4/4/2024)    Exercise Vital Sign     Days of Exercise per Week: 3 days     Minutes of Exercise per Session: 30 min   Stress: No Stress Concern Present (4/4/2024)    [employer] [place] [employer] - Occupational Stress Questionnaire     Feeling of Stress : Only a little   Housing Stability: Low Risk  (4/4/2024)    Housing Stability Vital Sign     Unable to Pay for Housing in the Last Year: No     Number of Places Lived in the Last Year: 1     Unstable Housing in the Last Year: No     COMPREHENSIVE GYN HISTORY:  PAP History: Denies abnormal Paps.  Infection History: Denies STDs. Denies PID.  Benign History: Denies uterine fibroids. Denies ovarian cysts. Denies endometriosis. Denies other conditions.  Cancer History: Denies cervical cancer. Denies uterine cancer or hyperplasia. Denies ovarian cancer. Denies vulvar cancer or pre-cancer. Denies vaginal cancer or pre-cancer. Denies breast cancer. Denies colon cancer.  Sexual Activity History: Reports currently being sexually active  Menstrual History: Denies menses. Pt is  not on ERT.     ROS:  GENERAL: No weight changes. No swelling. No fatigue. No fever.  CARDIOVASCULAR: No chest pain. No shortness of breath. No leg cramps.   NEUROLOGICAL: No headaches. No vision changes.  BREASTS: No pain. No lumps. No discharge.  ABDOMEN: No pain. No nausea. No vomiting. No diarrhea. No constipation.  REPRODUCTIVE: No abnormal bleeding.  VULVA: No pain. No lesions. No itching.  VAGINA: No relaxation. No itching. No odor. No discharge. No lesions.  URINARY: No incontinence. No nocturia. No frequency. No dysuria.    BP (!) 154/92   Ht 5' 2" (1.575 m)   Wt 74.1 kg (163 lb 5.8 oz)   BMI 29.88 kg/m²     PE:  APPEARANCE: Well nourished, well developed, in no acute distress.  AFFECT: WNL, alert and oriented x 3.  SKIN: No acne or hirsutism.  NECK: Neck symmetric without masses or thyromegaly.  NODES: No inguinal, cervical, axillary or femoral lymph node " enlargement.  CHEST: Good respiratory effort.   ABDOMEN: Soft. No tenderness or masses. No hepatosplenomegaly. No hernias.  BREASTS: Symmetrical, no skin changes or visible lesions. No palpable masses, nipple discharge bilaterally.  PELVIC: ATROPHIC EXTERNAL FEMALE GENITALIA without lesions. Normal hair distribution. Adequate perineal body, normal urethral meatus. VAGINA DRY without lesions or discharge. No significant cystocele or rectocele. Bimanual exam shows CERVIX and UTERUS to be SURGICALLY ABSENT. Adnexa without masses or tenderness.  EXTREMITIES: No edema.    DIAGNOSIS:  1. Encounter for gynecological examination without abnormal finding    2. Visit for screening mammogram    3. Benign paroxysmal vertigo, bilateral    4. Pure hypercholesterolemia, unspecified    5. BMI 29.0-29.9,adult    6. Osteoarthritis, unspecified osteoarthritis type, unspecified site    7. Insomnia, unspecified type    8. Obstructive sleep apnea syndrome      COUNSELING:  The patient was counseled today on  -osteoporosis prevention, calcium supplementation, regular weight bearing exercise.  -ACS PAP guidelines (no paps), with recommendations for yearly pelvic exams as her uterus and cervix were removed for benign reasons and ovaries remain;  -recommendation for yearly mammogram;  -to see her primary care physician for all other health maintenance.    FOLLOW-UP with me for next routine visit.

## 2024-11-13 ENCOUNTER — PATIENT MESSAGE (OUTPATIENT)
Dept: ADMINISTRATIVE | Facility: HOSPITAL | Age: 79
End: 2024-11-13
Payer: MEDICARE

## 2024-11-14 ENCOUNTER — TELEPHONE (OUTPATIENT)
Dept: PRIMARY CARE CLINIC | Facility: CLINIC | Age: 79
End: 2024-11-14
Payer: MEDICARE

## 2024-11-14 NOTE — TELEPHONE ENCOUNTER
----- Message from Julia sent at 11/14/2024 12:25 PM CST -----  Contact: Pt [phone]  Requesting an RX refill or new RX.    Is this a refill or new RX:  new rx    RX name and strength (copy/paste from chart):  meloxicam (MOBIC) 15 MG tablet    Is this a 30 day or 90 day RX: up to doctor    Pharmacy name and phone # (copy/paste from chart):    [facility] [address]  [address]  [place] [address]  Phone: [phone] Fax: [phone]       Requesting an RX refill or new RX.    Is this a refill or new RX: new rx    RX name and strength (copy/paste from chart): meclizine (ANTIVERT) 25 mg tablet     Is this a 30 day or 90 day RX:  up to doctor / old prescription : 90 tabs    Pharmacy name and phone # (copy/paste from chart):    [facility] [address]  [address] [place] [address]  Phone: [phone] Fax: [phone]         Please Call and advise    Thank you    Please do NOT rep[ly to sender as this is from the call center and they answer incoming calls only.

## 2024-11-21 ENCOUNTER — PATIENT MESSAGE (OUTPATIENT)
Dept: ADMINISTRATIVE | Facility: HOSPITAL | Age: 79
End: 2024-11-21
Payer: MEDICARE

## 2024-12-04 ENCOUNTER — OFFICE VISIT (OUTPATIENT)
Dept: PRIMARY CARE CLINIC | Facility: CLINIC | Age: 79
End: 2024-12-04
Payer: MEDICARE

## 2024-12-04 DIAGNOSIS — H81.13 BENIGN PAROXYSMAL VERTIGO, BILATERAL: ICD-10-CM

## 2024-12-04 PROCEDURE — 99213 OFFICE O/P EST LOW 20 MIN: CPT | Mod: 95,,, | Performed by: FAMILY MEDICINE

## 2024-12-04 RX ORDER — MELOXICAM 15 MG/1
7.5 TABLET ORAL DAILY PRN
Qty: 45 TABLET | Refills: 2 | Status: SHIPPED | OUTPATIENT
Start: 2024-12-04

## 2024-12-04 RX ORDER — MECLIZINE HYDROCHLORIDE 25 MG/1
25 TABLET ORAL 2 TIMES DAILY PRN
Qty: 90 TABLET | Refills: 2 | Status: SHIPPED | OUTPATIENT
Start: 2024-12-04

## 2024-12-06 NOTE — PROGRESS NOTES
"The patient location is: [place]  The chief complaint leading to consultation is: vertigo    Visit type: audiovisual    Face to Face time with patient: 25 minutes of total time spent on the encounter, which includes face to face time and non-face to face time preparing to see the patient (eg, review of tests), Obtaining and/or reviewing separately obtained history, Documenting clinical information in the electronic or other health record, Independently interpreting results (not separately reported) and communicating results to the patient/family/caregiver, or Care coordination (not separately reported).         Each patient to whom he or she provides medical services by telemedicine is:  (1) informed of the relationship between the physician and patient and the respective role of any other health care provider with respect to management of the patient; and (2) notified that he or she may decline to receive medical services by telemedicine and may withdraw from such care at any time.    Notes:       Clinic Note  12/5/2024      Subjective:       Patient ID:  Brandi is a 79 y.o. female being seen for:      History of Present Illness    CHIEF COMPLAINT:  Brandi presents today for medication refills and vertigo symptoms.    VERTIGO:  She reports experiencing vertigo symptoms that began last Friday, severe enough to confine her to bed for the entire day. Symptoms have persisted with varying intensity for nearly a week, particularly pronounced when lying down or getting up, describing a "loopity loop" sensation. This duration is unusual, as previous episodes typically resolved within a day with medication. She has attempted vestibular therapy exercises from past treatments, but finds they are not as effective as usual. She uses Meclizine for vertigo management, typically only during significant symptoms due to concerns about sleep interruption. She denies experiencing any numbness, tingling, or weakness in her arms and legs, as " well as problems with eating, drinking, or slurred speech.    SINUS SYMPTOMS:  She experienced sinus symptoms starting Friday, including an uncontrollable runny nose on Saturday that has since resolved. On Sunday, she had a slight nosebleed. Currently, she reports facial pressure in the nostril and facial area, but denies any nasal discharge.    MEDICATIONS:  She requests refills for meloxicam and meclizine, reporting approximately 10 pills remaining of meclizine. She confirms her blood pressure medication was recently refilled.    ORTHOPEDIC HISTORY:  She has a history of back issues and acknowledges the need for back surgery but is unwilling to undergo the procedure. She previously followed up with Dr. Abrams annually or biennially, with new MRIs ordered to monitor for changes. She recognizes the need to resume regular orthopedic care but has not recently chosen a new orthopedic specialist.    GYNECOLOGICAL CARE:  She recently visited Dr. James, her OB-GYN, and had a normal mammogram. She also mentions a recent yeast infection that was treated by Dr. James, which has since resolved.      ROS:  General: -fever, -chills, -fatigue, -weight gain, -weight loss  Eyes: -vision changes, -redness, -discharge  ENT: -ear pain, -nasal congestion, -sore throat, -nasal discharge  Cardiovascular: -chest pain, -palpitations, -lower extremity edema  Respiratory: -cough, -shortness of breath  Gastrointestinal: -abdominal pain, -nausea, -vomiting, -diarrhea, -constipation, -blood in stool  Genitourinary: -dysuria, -hematuria, -frequency  Musculoskeletal: -joint pain, -muscle pain  Skin: -rash, -lesion  Neurological: -headache, +dizziness, -numbness, -tingling, -weakness  Psychiatric: -anxiety, -depression, -sleep difficulty           Medication List with Changes/Refills   Current Medications    ACETAMINOPHEN (TYLENOL) 650 MG TBSR    Take 1,300 mg by mouth once daily.    AMLODIPINE (NORVASC) 5 MG TABLET    Take 1 tablet (5 mg  total) by mouth once daily. High blood pressure    BIMATOPROST (LATISSE) 0.03 % OPHTHALMIC SOLUTION    Place one drop on applicator and apply evenly along the skin of the upper eyelid at base of eyelashes once daily at bedtime; repeat procedure for second eye (use a clean applicator).    DICLOFENAC SODIUM 2 % SOPK    Apply 40 mg topically 2 (two) times a day.    DICLOFENAC SODIUM 2 % SOPK    Apply 1 each topically every 12 (twelve) hours.    MIRTAZAPINE (REMERON) 7.5 MG TAB    Take 7.5 mg by mouth nightly as needed.    ONDANSETRON (ZOFRAN-ODT) 4 MG TBDL    Take 1 tablet (4 mg total) by mouth every 8 (eight) hours as needed (nausea / vomiting).    PAROXETINE (PAXIL) 10 MG TABLET    Take 1 tablet (10 mg total) by mouth every morning. Hot flashes    TRIAMCINOLONE ACETONIDE 0.1% (KENALOG) 0.1 % OINTMENT    Apply topically 3 (three) times daily.    ZOLPIDEM (AMBIEN CR) 12.5 MG CR TABLET    Take 12.5 mg by mouth.   Changed and/or Refilled Medications    Modified Medication Previous Medication    MECLIZINE (ANTIVERT) 25 MG TABLET meclizine (ANTIVERT) 25 mg tablet       Take 1 tablet (25 mg total) by mouth 2 (two) times daily as needed for Dizziness.    Take 1 tablet (25 mg total) by mouth 2 (two) times daily as needed for Dizziness.    MELOXICAM (MOBIC) 15 MG TABLET meloxicam (MOBIC) 15 MG tablet       Take 0.5 tablets (7.5 mg total) by mouth daily as needed for Pain (take with food (avoid taking with ibuprofen, naproxen)).    Take 7.5 mg by mouth once daily.       Patient Active Problem List   Diagnosis    Benign paroxysmal vertigo, bilateral    Dermatitis    Generalized muscle weakness    Hearing loss    Hot flash, menopausal    Hyperlipidemia    IFG (impaired fasting glucose)    Insomnia    Chronic bilateral low back pain without sciatica    Lumbar stenosis with neurogenic claudication    Osteoarthritis    Other depressive disorder    Posttraumatic stress disorder    Pure hypercholesterolemia, unspecified    Right leg  paresthesias    Sciatica    Spondylolisthesis at L4-L5 level    DDD (degenerative disc disease), lumbar    Lumbar radiculopathy    S/P lumbar fusion    BMI 29.0-29.9,adult    Hot flash not due to menopause    Family history of colon cancer    Family history of breast cancer    COVID    Skin spots-aging    Sleep apnea    Weakness of both lower extremities    Chronic kidney disease, stage 3a           Objective:      There were no vitals taken for this visit.      Physical Exam    General: No acute distress. Well-developed. Well-nourished.  Eyes: EOMI. Sclerae anicteric.  HENT: Normocephalic. Atraumatic.   Respiratory: Normal respiratory effort.   Neurological: Alert & oriented x3. No slurred speech.   Psychiatric: Normal mood. Normal affect. Good insight. Good judgment.            Assessment and Plan:       - Suspected vertigo symptoms exacerbated by recent viral illness affecting sinuses, potentially causing fluid buildup behind ears  - Considered possibility of prolonged effects on eustacian tubes lasting weeks to months post-illness  - Evaluated for red flag symptoms; none reported  - Assessed effectiveness of current meclizine regimen for symptom management  - Considered need for further evaluation (imaging, ENT referral) if symptoms persist beyond expected timeframe    PERIPHERAL VERTIGO:  - Explained relationship between sinus inflammation (from allergies, viral, or bacterial causes) and vertigo symptoms.  - Discussed typical duration of viral sinus problems (3-7 days for significant improvement).  - Informed about potential for prolonged effects on eustacian tube dysfunction lasting weeks to months after acute illness.  - Brandi to continue performing vestibular therapy exercises as previously instructed.  - Refilled meclizine at current dose.  - Continued meclizine as needed for vertigo symptoms.    MUSCULOSKELETAL PAIN MANAGEMENT:  - Refilled meloxicam at current dose.    FOLLOW-UP:  - Follow up if vertigo  symptoms worsen or persist for several weeks.  - Contact the office if dizziness worsens, even with exercises and meclizine use.         Follow Up:   No follow-ups on file.    Other Orders Placed This Visit:  Orders Placed This Encounter    meloxicam (MOBIC) 15 MG tablet    meclizine (ANTIVERT) 25 mg tablet         Hao Chau MD        This note is dictated on [employer] word recognition program and This note was generated with the assistance of ambient listening technology. Verbal consent was obtained by the patient and accompanying visitor(s) for the recording of patient appointment to facilitate this note. I attest to having reviewed and edited the generated note for accuracy, though some syntax or spelling errors may persist. Please contact the author of this note for any clarification.  .  There are word recognition mistakes that are occasionally missed on review.

## 2024-12-18 NOTE — PROGRESS NOTES
DATE: 12/27/2024  PATIENT: Brandi Manuel    Attending Physician: Keith Morrissey M.D.    HISTORY:  Brandi Manuel is a 79 y.o. female with pmhx of a prior lumbar diskectomy, and lumbar spinal fusion complicated by infection in 2018 who returns to me today for follow up.  She was last seen by Dr. Singletary.  Today she is doing well but notes 5/10 low back pain that increases while walking. She also reports progressive leg weakness and lower extremity numbness.  The Patient denies myelopathic symptoms such as handwriting changes or difficulty with buttons/coins/keys. Denies perineal paresthesias, bowel/bladder dysfunction.    EXAM:  Wt 71.2 kg (156 lb 13.7 oz)   BMI 28.69 kg/m²   My physical examination was notable for the following findings:     Normal station and gait, no difficulty with toe or heel walk.   Dorsal lumbar skin negative for rashes, lesions, hairy patches. lumbar surgical scar. There is mild lumbar tenderness to palpation.  Lumbar range of motion is acceptable.  Global saggital and coronal spinal balance acceptable, not significant for scoliosis and kyphosis.  No pain with the range of motion of the bilateral hips. No trochanteric tenderness to palpation.  Bilateral lower extremities warm and well perfused, dorsalis pedis pulses 2+ bilaterally.  decreased strength and tone in all major motor groups in the bilateral lower extremities. Normal sensation to light touch in the L2-S1 dermatomes bilaterally.  Deep tendon reflexes symmetric 2+ in the bilateral lower extremities.  Negative Babinski bilaterally. Straight leg raise negative bilaterally.    IMAGING:  Today I personally re- reviewed AP, Lat and Flex/Ex  upright L-spine that demonstrate hardware in place without failure.     2022 MRI of the lumbar spine demonstrates status post L4-5 transforaminal lumbar interbody fusion without evidence of hardware complication. Foraminal stenosis at L3-4 and L5-S1     Body mass index is 28.69 kg/m².    Hemoglobin  A1C   Date Value Ref Range Status   08/21/2023 5.7 (H) 4.0 - 5.6 % Final     Comment:     ADA Screening Guidelines:  5.7-6.4%  Consistent with prediabetes  >or=6.5%  Consistent with diabetes    High levels of fetal hemoglobin interfere with the HbA1C  assay. Heterozygous hemoglobin variants (HbS, HgC, etc)do  not significantly interfere with this assay.   However, presence of multiple variants may affect accuracy.     06/09/2022 5.7 (H) 4.0 - 5.6 % Final     Comment:     ADA Screening Guidelines:  5.7-6.4%  Consistent with prediabetes  >or=6.5%  Consistent with diabetes    High levels of fetal hemoglobin interfere with the HbA1C  assay. Heterozygous hemoglobin variants (HbS, HgC, etc)do  not significantly interfere with this assay.   However, presence of multiple variants may affect accuracy.     10/12/2020 5.8 (H) 4.0 - 5.6 % Final     Comment:     ADA Screening Guidelines:  5.7-6.4%  Consistent with prediabetes  >or=6.5%  Consistent with diabetes  High levels of fetal hemoglobin interfere with the HbA1C  assay. Heterozygous hemoglobin variants (HbS, HgC, etc)do  not significantly interfere with this assay.   However, presence of multiple variants may affect accuracy.           ASSESSMENT/PLAN:    Brandi was seen today for pain.    Diagnoses and all orders for this visit:    Dorsalgia, unspecified  -     Cancel: CT Lumbar Spine Without Contrast; Future  -     MRI Lumbar Spine W WO Contrast; Future  -     CT Lumbar Spine Without Contrast; Future    S/P spinal surgery  -     Cancel: CT Lumbar Spine Without Contrast; Future  -     MRI Lumbar Spine W WO Contrast; Future  -     gabapentin (NEURONTIN) 100 MG capsule; Take 1-3 capsules (100-300 mg total) by mouth every evening.  -     Ambulatory Referral/Consult to Physical Therapy; Future  -     CT Lumbar Spine Without Contrast; Future    Lumbar radiculopathy  -     Cancel: CT Lumbar Spine Without Contrast; Future  -     MRI Lumbar Spine W WO Contrast; Future  -      gabapentin (NEURONTIN) 100 MG capsule; Take 1-3 capsules (100-300 mg total) by mouth every evening.  -     Ambulatory Referral/Consult to Physical Therapy; Future  -     CT Lumbar Spine Without Contrast; Future    Routine lab draw  -     Creatinine, serum; Future    Other orders  -     diclofenac sodium 2 % SoPk; Apply 1 each topically every 12 (twelve) hours.    Today we discussed at length all of the different treatment options including anti-inflammatories, acetaminophen, rest, ice, heat, physical therapy including strengthening and stretching exercises, home exercises, ROM, aerobic conditioning, aqua therapy, other modalities including ultrasound, massage, and dry needling, epidural steroid injections and finally surgical intervention.    New imaging ordered, follow up for results

## 2024-12-19 ENCOUNTER — TELEPHONE (OUTPATIENT)
Dept: ORTHOPEDICS | Facility: CLINIC | Age: 79
End: 2024-12-19
Payer: MEDICARE

## 2024-12-19 DIAGNOSIS — M51.361 DEGENERATION OF INTERVERTEBRAL DISC OF LUMBAR REGION WITH LOWER EXTREMITY PAIN: Primary | ICD-10-CM

## 2024-12-19 NOTE — TELEPHONE ENCOUNTER
Spoke to patient regarding x-ray. Patient aware of x-ray scheduled for 1:00 pm at the [facility] on 12/27/2024. Patient stated thank you. Thanks.

## 2024-12-27 ENCOUNTER — OFFICE VISIT (OUTPATIENT)
Dept: ORTHOPEDICS | Facility: CLINIC | Age: 79
End: 2024-12-27
Payer: MEDICARE

## 2024-12-27 ENCOUNTER — HOSPITAL ENCOUNTER (OUTPATIENT)
Dept: RADIOLOGY | Facility: HOSPITAL | Age: 79
Discharge: HOME | End: 2024-12-27
Attending: REGISTERED NURSE
Payer: MEDICARE

## 2024-12-27 VITALS — WEIGHT: 156.88 LBS | BODY MASS INDEX: 28.69 KG/M2

## 2024-12-27 DIAGNOSIS — M54.9 DORSALGIA, UNSPECIFIED: Primary | ICD-10-CM

## 2024-12-27 DIAGNOSIS — Z01.89 ROUTINE LAB DRAW: ICD-10-CM

## 2024-12-27 DIAGNOSIS — M51.361 DEGENERATION OF INTERVERTEBRAL DISC OF LUMBAR REGION WITH LOWER EXTREMITY PAIN: ICD-10-CM

## 2024-12-27 DIAGNOSIS — Z98.890 S/P SPINAL SURGERY: ICD-10-CM

## 2024-12-27 DIAGNOSIS — M54.16 LUMBAR RADICULOPATHY: ICD-10-CM

## 2024-12-27 PROCEDURE — 72110 X-RAY EXAM L-2 SPINE 4/>VWS: CPT | Mod: 26,,, | Performed by: RADIOLOGY

## 2024-12-27 PROCEDURE — 72110 X-RAY EXAM L-2 SPINE 4/>VWS: CPT | Mod: TC

## 2024-12-27 PROCEDURE — 99213 OFFICE O/P EST LOW 20 MIN: CPT | Mod: PBBFAC,25 | Performed by: REGISTERED NURSE

## 2024-12-27 PROCEDURE — 99999 PR PBB SHADOW E&M-EST. PATIENT-LVL III: CPT | Mod: PBBFAC,,, | Performed by: REGISTERED NURSE

## 2024-12-27 RX ORDER — GABAPENTIN 100 MG/1
100-300 CAPSULE ORAL NIGHTLY
Qty: 90 CAPSULE | Refills: 11 | Status: SHIPPED | OUTPATIENT
Start: 2024-12-27 | End: 2025-12-27

## 2024-12-29 ENCOUNTER — HOSPITAL ENCOUNTER (EMERGENCY)
Facility: HOSPITAL | Age: 79
Discharge: HOME | End: 2024-12-29
Attending: EMERGENCY MEDICINE
Payer: MEDICARE

## 2024-12-29 VITALS
SYSTOLIC BLOOD PRESSURE: 162 MMHG | OXYGEN SATURATION: 99 % | HEART RATE: 65 BPM | BODY MASS INDEX: 28.71 KG/M2 | RESPIRATION RATE: 16 BRPM | WEIGHT: 156 LBS | DIASTOLIC BLOOD PRESSURE: 82 MMHG | HEIGHT: 62 IN | TEMPERATURE: 98 F

## 2024-12-29 DIAGNOSIS — M54.50 BILATERAL LOW BACK PAIN WITHOUT SCIATICA, UNSPECIFIED CHRONICITY: Primary | ICD-10-CM

## 2024-12-29 LAB
ANION GAP SERPL CALC-SCNC: 11 MMOL/L (ref 8–16)
BASOPHILS # BLD AUTO: 0.01 K/UL (ref 0–0.2)
BASOPHILS NFR BLD: 0.3 % (ref 0–1.9)
BILIRUB UR QL STRIP: NEGATIVE
BUN SERPL-MCNC: 13 MG/DL (ref 8–23)
CALCIUM SERPL-MCNC: 9.3 MG/DL (ref 8.7–10.5)
CHLORIDE SERPL-SCNC: 105 MMOL/L (ref 95–110)
CLARITY UR: CLEAR
CO2 SERPL-SCNC: 23 MMOL/L (ref 23–29)
COLOR UR: COLORLESS
CREAT SERPL-MCNC: 0.9 MG/DL (ref 0.5–1.4)
DIFFERENTIAL METHOD BLD: ABNORMAL
EOSINOPHIL # BLD AUTO: 0.1 K/UL (ref 0–0.5)
EOSINOPHIL NFR BLD: 1.7 % (ref 0–8)
ERYTHROCYTE [DISTWIDTH] IN BLOOD BY AUTOMATED COUNT: 12.7 % (ref 11.5–14.5)
EST. GFR  (NO RACE VARIABLE): >60 ML/MIN/1.73 M^2
GLUCOSE SERPL-MCNC: 96 MG/DL (ref 70–110)
GLUCOSE UR QL STRIP: NEGATIVE
HCT VFR BLD AUTO: 35.1 % (ref 37–48.5)
HGB BLD-MCNC: 11.3 G/DL (ref 12–16)
HGB UR QL STRIP: NEGATIVE
IMM GRANULOCYTES # BLD AUTO: 0.01 K/UL (ref 0–0.04)
IMM GRANULOCYTES NFR BLD AUTO: 0.3 % (ref 0–0.5)
KETONES UR QL STRIP: NEGATIVE
LEUKOCYTE ESTERASE UR QL STRIP: NEGATIVE
LYMPHOCYTES # BLD AUTO: 1.3 K/UL (ref 1–4.8)
LYMPHOCYTES NFR BLD: 42.7 % (ref 18–48)
MCH RBC QN AUTO: 29.9 PG (ref 27–31)
MCHC RBC AUTO-ENTMCNC: 32.2 G/DL (ref 32–36)
MCV RBC AUTO: 93 FL (ref 82–98)
MONOCYTES # BLD AUTO: 0.3 K/UL (ref 0.3–1)
MONOCYTES NFR BLD: 8.3 % (ref 4–15)
NEUTROPHILS # BLD AUTO: 1.4 K/UL (ref 1.8–7.7)
NEUTROPHILS NFR BLD: 46.7 % (ref 38–73)
NITRITE UR QL STRIP: NEGATIVE
NRBC BLD-RTO: 0 /100 WBC
PH UR STRIP: 7 [PH] (ref 5–8)
PLATELET # BLD AUTO: 153 K/UL (ref 150–450)
PMV BLD AUTO: 10.5 FL (ref 9.2–12.9)
POTASSIUM SERPL-SCNC: 4.1 MMOL/L (ref 3.5–5.1)
PROT UR QL STRIP: NEGATIVE
RBC # BLD AUTO: 3.78 M/UL (ref 4–5.4)
SODIUM SERPL-SCNC: 139 MMOL/L (ref 136–145)
SP GR UR STRIP: 1.01 (ref 1–1.03)
URN SPEC COLLECT METH UR: ABNORMAL
UROBILINOGEN UR STRIP-ACNC: NEGATIVE EU/DL
WBC # BLD AUTO: 3.02 K/UL (ref 3.9–12.7)

## 2024-12-29 PROCEDURE — 81003 URINALYSIS AUTO W/O SCOPE: CPT | Performed by: EMERGENCY MEDICINE

## 2024-12-29 PROCEDURE — 96374 THER/PROPH/DIAG INJ IV PUSH: CPT

## 2024-12-29 PROCEDURE — 85025 COMPLETE CBC W/AUTO DIFF WBC: CPT | Performed by: EMERGENCY MEDICINE

## 2024-12-29 PROCEDURE — 80048 BASIC METABOLIC PNL TOTAL CA: CPT | Performed by: EMERGENCY MEDICINE

## 2024-12-29 PROCEDURE — 25500020 PHARM REV CODE 255

## 2024-12-29 PROCEDURE — 63600175 PHARM REV CODE 636 W HCPCS: Performed by: EMERGENCY MEDICINE

## 2024-12-29 PROCEDURE — A9585 GADOBUTROL INJECTION: HCPCS

## 2024-12-29 PROCEDURE — 99285 EMERGENCY DEPT VISIT HI MDM: CPT | Mod: 25

## 2024-12-29 PROCEDURE — 36415 COLL VENOUS BLD VENIPUNCTURE: CPT | Performed by: EMERGENCY MEDICINE

## 2024-12-29 RX ORDER — KETOROLAC TROMETHAMINE 30 MG/ML
15 INJECTION, SOLUTION INTRAMUSCULAR; INTRAVENOUS
Status: COMPLETED | OUTPATIENT
Start: 2024-12-29 | End: 2024-12-29

## 2024-12-29 RX ORDER — KETOROLAC TROMETHAMINE 10 MG/1
10 TABLET, FILM COATED ORAL EVERY 6 HOURS PRN
Qty: 20 TABLET | Refills: 0 | Status: SHIPPED | OUTPATIENT
Start: 2024-12-29 | End: 2025-01-03

## 2024-12-29 RX ORDER — METHOCARBAMOL 500 MG/1
1000 TABLET, FILM COATED ORAL 3 TIMES DAILY PRN
Qty: 20 TABLET | Refills: 0 | Status: SHIPPED | OUTPATIENT
Start: 2024-12-29 | End: 2025-01-03

## 2024-12-29 RX ORDER — GADOBUTROL 604.72 MG/ML
INJECTION INTRAVENOUS
Status: COMPLETED
Start: 2024-12-29 | End: 2024-12-29

## 2024-12-29 RX ADMIN — KETOROLAC TROMETHAMINE 15 MG: 30 INJECTION, SOLUTION INTRAMUSCULAR; INTRAVENOUS at 03:12

## 2024-12-29 RX ADMIN — GADOBUTROL 7 ML: 604.72 INJECTION INTRAVENOUS at 11:12

## 2024-12-29 NOTE — ED PROVIDER NOTES
Encounter Date: 12/29/2024       History     Chief Complaint   Patient presents with    Back Pain    Dysuria     79-year-old female presents for lower back pain.  The patient reports that she has chronic lower back pain, that got worse 2 days ago.  She denies trauma, injury, radiation of pain, abdominal pain, dysuria, hematuria, nausea/vomiting, fever/chills.  She reports a history lower back surgery but has been told that she needs another surgery and has been putting it off.  She reports decreased urine output for the last few days as well and is concerned that her kidney function may be decreased.  Her pain is worse with palpation and movement.  There are no alleviating factors.  She also denies any bowel/bladder incontinence or urinary retention.      Review of patient's allergies indicates:   Allergen Reactions    Codeine     Hydrocodone-acetaminophen Nausea Only     No past medical history on file.  Past Surgical History:   Procedure Laterality Date    BREAST BIOPSY      HYSTERECTOMY      SHOULDER SURGERY      TONSILLECTOMY       Family History   Problem Relation Name Age of Onset    Breast cancer Daughter       Social History     Tobacco Use    Smoking status: Never     Passive exposure: Never    Smokeless tobacco: Never   Substance Use Topics    Alcohol use: No    Drug use: Not Currently     Review of Systems   Constitutional:  Negative for chills and fever.   HENT:  Negative for congestion.    Respiratory:  Negative for cough and shortness of breath.    Cardiovascular:  Negative for chest pain.   Gastrointestinal:  Negative for abdominal pain, nausea and vomiting.   Genitourinary:  Positive for decreased urine volume. Negative for dysuria.   Musculoskeletal:  Positive for back pain. Negative for gait problem.   Skin:  Negative for color change.   Neurological:  Negative for dizziness and numbness.   Psychiatric/Behavioral:  Negative for agitation.        Physical Exam     Initial Vitals [12/29/24 0825]   BP  Pulse Resp Temp SpO2   (!) 173/72 60 20 97.7 °F (36.5 °C) 98 %      MAP       --         Physical Exam    Nursing note and vitals reviewed.  Constitutional: She appears well-developed and well-nourished.   HENT:   Head: Normocephalic and atraumatic.   Eyes: EOM are normal. Pupils are equal, round, and reactive to light.   Neck:   Normal range of motion.  Cardiovascular:  Normal rate and regular rhythm.           Pulmonary/Chest: Breath sounds normal.   Abdominal: Abdomen is soft. Bowel sounds are normal. She exhibits no distension. There is no abdominal tenderness. There is no rebound and no guarding.   Musculoskeletal:         General: Tenderness present. Normal range of motion.      Right shoulder: Normal.      Left shoulder: Normal.      Cervical back: Normal range of motion.      Comments: Tenderness to bilateral lower back regions just above her pelvis.  No midline spinal tenderness or step-offs noted.  No CVA tenderness noted.     Neurological: She is alert and oriented to person, place, and time.   Skin: Skin is warm and dry.   Psychiatric: She has a normal mood and affect.         ED Course   Procedures  Labs Reviewed   CBC W/ AUTO DIFFERENTIAL - Abnormal       Result Value    WBC 3.02 (*)     RBC 3.78 (*)     Hemoglobin 11.3 (*)     Hematocrit 35.1 (*)     MCV 93      MCH 29.9      MCHC 32.2      RDW 12.7      Platelets 153      MPV 10.5      Immature Granulocytes 0.3      Gran # (ANC) 1.4 (*)     Immature Grans (Abs) 0.01      Lymph # 1.3      Mono # 0.3      Eos # 0.1      Baso # 0.01      nRBC 0      Gran % 46.7      Lymph % 42.7      Mono % 8.3      Eosinophil % 1.7      Basophil % 0.3      Differential Method Automated     URINALYSIS, REFLEX TO URINE CULTURE - Abnormal    Specimen UA Urine, Clean Catch      Color, UA Colorless (*)     Appearance, UA Clear      pH, UA 7.0      Specific Gravity, UA 1.010      Protein, UA Negative      Glucose, UA Negative      Ketones, UA Negative      Bilirubin (UA)  Negative      Occult Blood UA Negative      Nitrite, UA Negative      Urobilinogen, UA Negative      Leukocytes, UA Negative      Narrative:     Specimen Source->Urine   BASIC METABOLIC PANEL    Sodium 139      Potassium 4.1      Chloride 105      CO2 23      Glucose 96      BUN 13      Creatinine 0.9      Calcium 9.3      Anion Gap 11      eGFR >60            Imaging Results              MRI Lumbar Spine W WO Cont (Final result)  Result time 12/29/24 12:03:31      Final result by Sophie Nielsen MD (12/29/24 12:03:31)                   Impression:      There is evidence of multilevel degenerative changes in this patient who is status post L4-5 posterior fusion.  The appearance of the spine, alignment of the spine and appearance of postsurgical site are similar to what was seen in 2022.      Electronically signed by: Sophie Nielsen MD  Date:    12/29/2024  Time:    12:03               Narrative:    EXAMINATION:  MRI LUMBAR SPINE W WO CONTRAST    CLINICAL HISTORY:  Low back pain, progressive neurologic deficit;    TECHNIQUE:  Multiplanar, multisequence MR imaging of the lumbar spine was acquired with and without contrast.    COMPARISON:  09/01/2022    FINDINGS:  The conus lies at L2.  The marrow signal is within normal limits.  The patient is status post posterior fusion of L4-5.    L1/2: There are no significant degenerative changes at this level.    L2/3: There is a hemangioma at the superior aspect of L2.  There is grade 1 retrolisthesis of L2 with respect L1.  There is a posteriorly oriented disc protrusion resulting in mild narrowing of the anterior aspect of the canal and mild narrowing of bilateral neural foramina at this level.    L3/4: There is grade 1 retrolisthesis of L3 with respect L4 with a posteriorly oriented disc bulge in addition to ligamentum flavum hypertrophy resulting in moderate canal narrowing.  The appearance at this level is similar to what was seen in 2022.    The patient is status post  fusion of L4-5 with grade 1 anterolisthesis of L4 with respect L5.  This is similar to prior exam.  There is no evidence significant central canal narrowing at this level.  There is mild bilateral neural foraminal narrowing at this level similar to prior exam.    L5/S1: There is mild bilateral neural foraminal narrowing at this level similar to prior exam.  There is no evidence clumping of the nerve roots.  There is fatty atrophy of the paraspinal musculature.    There is no evidence paraspinal fluid collection or enhancing fluid collection.  No fluid signal is seen within the disc spaces.                                       Medications   gadobutroL (GADAVIST) 10 mmol/10 mL (1 mmol/mL) injection (7 mLs Intravenous Given 12/29/24 1152)   ketorolac injection 15 mg (15 mg Intravenous Given 12/29/24 1537)     Medical Decision Making  71-year-old female presents for lower back pain.      Initial differential diagnosis included but not limited to cauda equina syndrome, musculoskeletal pain, and strain.    Amount and/or Complexity of Data Reviewed  Labs: ordered.  Radiology: ordered.    Risk  Prescription drug management.  Risk Details: The patient was emergently evaluated in the emergency department, her evaluation was significant for an elderly female with lower back tenderness to palpation.  The patient does report some decreased urine output and was noted initially to have some retained urine on bladder scan.  The patient's labs showed no acute abnormalities or signs of urinary tract infection.  The patient's MRI of her lower back showed no acute abnormalities per Radiology.  The patient's pain was treated with a dose of parental Toradol, with significant improvement in it.  The patient likely has musculoskeletal back pain on top of her chronic back pain.  A repeat bladder scan was done and it showed no retention of urine.  The patient is stable for discharge to home and does not require further care or workup at  this time.  She will be discharged home with p.o. Toradol and p.o. Robaxin.  She is referred to primary care for follow-up.                                      Clinical Impression:  Final diagnoses:  [M54.50] Bilateral low back pain without sciatica, unspecified chronicity (Primary)          ED Disposition Condition    Discharge Stable          ED Prescriptions       Medication Sig Dispense Start Date End Date Auth. Provider    ketorolac (TORADOL) 10 mg tablet Take 1 tablet (10 mg total) by mouth every 6 (six) hours as needed for Pain. 20 tablet 12/29/2024 1/3/2025 Mode Ellis MD    methocarbamoL (ROBAXIN) 500 MG Tab Take 2 tablets (1,000 mg total) by mouth 3 (three) times daily as needed (pain). 20 tablet 12/29/2024 1/3/2025 Mode Ellis MD          Follow-up Information       Follow up With Specialties Details Why Contact Info    Hao Chau MD Family Medicine Schedule an appointment as soon as possible for a visit   [address] [facility] [facility] [address]  [phone]               Mode Ellis MD  12/30/24 8981

## 2024-12-31 NOTE — PROGRESS NOTES
"DATE: 1/15/2025  PATIENT: Brandi Manuel    Attending Physician: Keith Morrissey M.D.    HISTORY:  Brandi Manuel is a 79 y.o. female who returns to me today for imaging results.  She was last seen by me 12/27/2024.  Today she is doing well but notes 5/10 low back pain that increases while walking.   The Patient denies myelopathic symptoms such as handwriting changes or difficulty with buttons/coins/keys. Denies perineal paresthesias, bowel/bladder dysfunction.    EXAM:  Ht 5' 2" (1.575 m)   Wt 70.4 kg (155 lb 1.5 oz)   BMI 28.37 kg/m²   Stable     IMAGING:  Today I personally re- reviewed AP, Lat and Flex/Ex  upright L-spine that demonstrate hardware at L4-5 in place without failure.     CT lumbar shows bony fusion    Lumbar MRI shows moderate stenosis at L3-4 with bilateral foraminal narrowing at L5/S1.     Body mass index is 28.37 kg/m².    Hemoglobin A1C   Date Value Ref Range Status   08/21/2023 5.7 (H) 4.0 - 5.6 % Final     Comment:     ADA Screening Guidelines:  5.7-6.4%  Consistent with prediabetes  >or=6.5%  Consistent with diabetes    High levels of fetal hemoglobin interfere with the HbA1C  assay. Heterozygous hemoglobin variants (HbS, HgC, etc)do  not significantly interfere with this assay.   However, presence of multiple variants may affect accuracy.     06/09/2022 5.7 (H) 4.0 - 5.6 % Final     Comment:     ADA Screening Guidelines:  5.7-6.4%  Consistent with prediabetes  >or=6.5%  Consistent with diabetes    High levels of fetal hemoglobin interfere with the HbA1C  assay. Heterozygous hemoglobin variants (HbS, HgC, etc)do  not significantly interfere with this assay.   However, presence of multiple variants may affect accuracy.     10/12/2020 5.8 (H) 4.0 - 5.6 % Final     Comment:     ADA Screening Guidelines:  5.7-6.4%  Consistent with prediabetes  >or=6.5%  Consistent with diabetes  High levels of fetal hemoglobin interfere with the HbA1C  assay. Heterozygous hemoglobin variants (HbS, HgC, " etc)do  not significantly interfere with this assay.   However, presence of multiple variants may affect accuracy.           ASSESSMENT/PLAN:    Brandi was seen today for low-back pain and back pain.    Diagnoses and all orders for this visit:    S/P spinal surgery    Lumbar radiculopathy  -     Procedure Order to Pain Management; Future    Other orders  -     diclofenac sodium 2 % SoPk; Apply 1 Pump topically every 12 (twelve) hours as needed (pain).      XIANG ordered, follow up 2 weeks after if pain persists.

## 2025-01-08 ENCOUNTER — PATIENT MESSAGE (OUTPATIENT)
Dept: ADMINISTRATIVE | Facility: HOSPITAL | Age: 80
End: 2025-01-08
Payer: MEDICARE

## 2025-01-09 ENCOUNTER — CLINICAL SUPPORT (OUTPATIENT)
Dept: REHABILITATION | Facility: HOSPITAL | Age: 80
End: 2025-01-09
Payer: MEDICARE

## 2025-01-09 DIAGNOSIS — Z98.890 S/P SPINAL SURGERY: ICD-10-CM

## 2025-01-09 DIAGNOSIS — M54.16 LUMBAR RADICULOPATHY: ICD-10-CM

## 2025-01-09 DIAGNOSIS — R26.89 DECREASED FUNCTIONAL MOBILITY: Primary | ICD-10-CM

## 2025-01-09 PROCEDURE — 97110 THERAPEUTIC EXERCISES: CPT | Mod: PN

## 2025-01-09 PROCEDURE — 97162 PT EVAL MOD COMPLEX 30 MIN: CPT | Mod: PN
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
## 2025-01-12 ENCOUNTER — HOSPITAL ENCOUNTER (OUTPATIENT)
Dept: RADIOLOGY | Facility: HOSPITAL | Age: 80
Discharge: HOME | End: 2025-01-12
Attending: REGISTERED NURSE
Payer: MEDICARE

## 2025-01-12 DIAGNOSIS — Z98.890 S/P SPINAL SURGERY: ICD-10-CM

## 2025-01-12 DIAGNOSIS — M54.9 DORSALGIA, UNSPECIFIED: ICD-10-CM

## 2025-01-12 DIAGNOSIS — M54.16 LUMBAR RADICULOPATHY: ICD-10-CM

## 2025-01-12 PROCEDURE — 72131 CT LUMBAR SPINE W/O DYE: CPT | Mod: TC

## 2025-01-12 PROCEDURE — 72131 CT LUMBAR SPINE W/O DYE: CPT | Mod: 26,,, | Performed by: RADIOLOGY

## 2025-01-14 ENCOUNTER — CLINICAL SUPPORT (OUTPATIENT)
Dept: REHABILITATION | Facility: HOSPITAL | Age: 80
End: 2025-01-14
Payer: MEDICARE

## 2025-01-14 DIAGNOSIS — R26.89 DECREASED FUNCTIONAL MOBILITY: Primary | ICD-10-CM

## 2025-01-14 PROCEDURE — 97110 THERAPEUTIC EXERCISES: CPT | Mod: PN,CQ

## 2025-01-14 NOTE — PROGRESS NOTES
"  [facility]   Physical Therapy Treatment Note        Name: Brandi Manuel  Clinic Number: [id number]    Therapy Diagnosis:   Encounter Diagnosis   Name Primary?    Decreased functional mobility Yes     Physician: RICHIE Banks NP    Visit Date: 1/14/2025  Last Scheduled Visit:        Physician Orders: PT Eval and Treat   Medical Diagnosis from Referral:   Z98.890 (ICD-10-CM) - S/P spinal surgery   M54.16 (ICD-10-CM) - Lumbar radiculopathy      Evaluation Date: 1/9/2025  Authorization Period Expiration: 12/27/25  Plan of Care Expiration: 4/2/25 or 24 Visit  Progress Note Due: 2/8/2025  Visit # / Visits authorized: 1/ 20  Visits Remaining - 15  PTA visit #: 1/6  Precautions: Standard per MD "physical therapy including strengthening and stretching exercises, home exercises, ROM, aerobic conditioning, aqua therapy, other modalities including ultrasound, massage, and dry needling, epidural steroid injections and finally surgical intervention. "       Time In: 8:00  Time Out: 9:00  Billable Time: 60 minutes / 30 min      Insurance: Payor: MEDICARE / Plan: MEDICARE PART A & B / Product Type: Government /     Subjective     Pt reports: No pain today, but pain yesterday.  She was compliant with home exercise program.  Response to previous treatment: 1st session   Functional change: 1st session     Pain: 0/10  Location: bilateral lumbar      Objective     Objective Measures Performed  - Evaluation     Treatment      Brandi received the treatments listed below (Bold exercise performed during 1/14/2025 visit):      therapeutic exercises to develop strength, endurance, ROM, and flexibility for 60 minutes including:    Warm-up    Supine  SKC B - 30 reps with towel  LTR B- 30 reps   SLR bilateral   LE - 30 reps   Hip abduction green TB B - 30 reps   Bridges 3x10   PPT - 30 reps with 3" hold        Seated    Sciatic nerve glides 3x10  Piriformis stretch 4x30s  Hamstring 3x30s B  TA activation  - 30 " "reps with 3" hold     Standing                Home Exercises Provided and Patient Education Provided     Education provided:   - Continue with HEP    Written Home Exercises Provided: yes. Exercises were reviewed and Brandi was able to demonstrate them prior to the end of the session.  Brandi demonstrated fair  understanding of the education provided. See EMR under Patient Instructions for exercises provided during therapy sessions    Assessment     Pt tolerated session well. PTA educated pt on importance of verbalizing what she's feeling in therapy so exercises and modalities can be tailored to her symptoms. Sciatic nerve glides added to address radicular symptoms, pt reported decreased numbness. Piriformis and hamstring stretch also added to decrease tension in R LE muscles. Brandi had no adverse effects w/ exercises and will continue to benefit from skilled therapy.     Brandi Is progressing well towards her goals.   Pt prognosis is Fair.     Pt will continue to benefit from skilled outpatient physical therapy to address the deficits listed in the problem list box on initial evaluation, provide pt/family education and to maximize pt's level of independence in the home and community environment.     Pt's spiritual, cultural and educational needs considered and pt agreeable to plan of care and goals.    Anticipated barriers to physical therapy: None Identified at eval    Goals:  Short Term Goals: 4 weeks 2/6/2025 (4)        Goal   Status     Pt. to report decreased lumbar pain </ =  4/10 at worst to increase tolerance for upright unsupported sitting posture.     Pt. to demonstrate proper posture requiring minimum verbal cues from PT for improved posture positioning      Increase L1 - L3 joint mobility to 3/6 to promote greater ease with squat to stand transitioning.     Increase lumbar flexion AROM >= 25 to 65 degrees to promote greater ease with self-care.     Increase AROM lumbar rotation bilateral  >= +5 degrees " to promote greater ease with driving.     Increase lumbar side bending bilateral >= +5 degrees to improve function reaching beyond base of support.      Pt. to demonstrate increased MMT for rectus abdominus  >=  4-/5 to improve supine to sitting transfer.     Pt. to demonstrate increased MMT for Lumbar extensor paraspinals t >=  4-/5 to improve tolerance for prolong standing and ambulation      Pt. to demonstrate increased MMT for Lumbar/thoracic rotators   >= 4-/5 to improve tolerance for ADL and work activities.      Pt. to demonstrate increased MMT for Lumbar/thoracic side bending   >= 4-/5 to improve household cleaning.      Pt to be independent with HEP to improve ROM and independence with ADL's           Long Term Goals: 8 weeks  3/6/2025 (8)     Goal   Status     Pt. to report decreased lumbar pain </ =  2/10 at worst to increase tolerance for upright unsupported sitting posture.     Pt. to demonstrate proper posture requiring minimum verbal cues from PT for improved posture positioning      Increase L3 - L5 joint mobility to 3/6 to promote greater ease with squat to stand transitioning.     Increase lumbar flexion AROM >= 30 to 70 degrees to promote greater ease with self-care.     Increase AROM lumbar rotation bilateral  >= +10 degrees to promote greater ease with driving.     Increase lumbar side bending bilateral >= +10 degrees to improve function reaching beyond base of support.      Pt. to demonstrate increased MMT for rectus abdominus  >=  4/5 to improve supine to sitting transfer.     Pt. to demonstrate increased MMT for Lumbar extensor paraspinals t >=  4/5 to improve tolerance for prolong standing and ambulation      Pt. to demonstrate increased MMT for Lumbar/thoracic rotators   >= 4/5 to improve tolerance for ADL and work activities.      Pt. to demonstrate increased MMT for Lumbar/thoracic side bending   >= 4/5 to improve household cleaning.      Pt to be independent with HEP to improve ROM and  independence with ADL's         Plan     Continued with current POC      Hoang Baxter, PTA   1/14/2025

## 2025-01-15 ENCOUNTER — OFFICE VISIT (OUTPATIENT)
Dept: ORTHOPEDICS | Facility: CLINIC | Age: 80
End: 2025-01-15
Payer: MEDICARE

## 2025-01-15 VITALS — WEIGHT: 155.13 LBS | HEIGHT: 62 IN | BODY MASS INDEX: 28.55 KG/M2

## 2025-01-15 DIAGNOSIS — M54.16 LUMBAR RADICULOPATHY: ICD-10-CM

## 2025-01-15 DIAGNOSIS — M54.16 LUMBAR RADICULOPATHY: Primary | ICD-10-CM

## 2025-01-15 DIAGNOSIS — Z98.890 S/P SPINAL SURGERY: Primary | ICD-10-CM

## 2025-01-15 PROCEDURE — 99213 OFFICE O/P EST LOW 20 MIN: CPT | Mod: PBBFAC | Performed by: REGISTERED NURSE

## 2025-01-15 PROCEDURE — 99999 PR PBB SHADOW E&M-EST. PATIENT-LVL III: CPT | Mod: PBBFAC,,, | Performed by: REGISTERED NURSE

## 2025-01-15 PROCEDURE — 99213 OFFICE O/P EST LOW 20 MIN: CPT | Mod: S$PBB,,, | Performed by: REGISTERED NURSE

## 2025-01-16 ENCOUNTER — PATIENT MESSAGE (OUTPATIENT)
Dept: PAIN MEDICINE | Facility: OTHER | Age: 80
End: 2025-01-16
Payer: MEDICARE

## 2025-01-16 ENCOUNTER — CLINICAL SUPPORT (OUTPATIENT)
Dept: REHABILITATION | Facility: HOSPITAL | Age: 80
End: 2025-01-16
Payer: MEDICARE

## 2025-01-16 DIAGNOSIS — R26.89 DECREASED FUNCTIONAL MOBILITY: Primary | ICD-10-CM

## 2025-01-16 PROCEDURE — 97110 THERAPEUTIC EXERCISES: CPT | Mod: PN,CQ

## 2025-01-16 NOTE — PROGRESS NOTES
"  [facility]   Physical Therapy Treatment Note        Name: Brandi Manuel  Clinic Number: [id number]    Therapy Diagnosis:   Encounter Diagnosis   Name Primary?    Decreased functional mobility Yes     Physician: RICHIE Banks NP    Visit Date: 1/16/2025  Last Scheduled Visit:        Physician Orders: PT Eval and Treat   Medical Diagnosis from Referral:   Z98.890 (ICD-10-CM) - S/P spinal surgery   M54.16 (ICD-10-CM) - Lumbar radiculopathy      Evaluation Date: 1/9/2025  Authorization Period Expiration: 12/27/25  Plan of Care Expiration: 4/2/25 or 24 Visit  Progress Note Due: 2/8/2025  Visit # / Visits authorized: 2/ 20  Visits Remaining - 15  PTA visit #: 2/6  Precautions: Standard per MD "physical therapy including strengthening and stretching exercises, home exercises, ROM, aerobic conditioning, aqua therapy, other modalities including ultrasound, massage, and dry needling, epidural steroid injections and finally surgical intervention. "       Time In: 8:00  Time Out: 8:55  Billable Time: 55 minutes       Insurance: Payor: MEDICARE / Plan: MEDICARE PART A & B / Product Type: Government /     Subjective     Pt reports: No pain today.  She was compliant with home exercise program.  Response to previous treatment: no problems.  Functional change: not at this time.     Pain: 0/10  Location: bilateral lumbar      Objective     Objective Measures Performed  - Evaluation     Treatment      Brandi received the treatments listed below (Bold exercise performed during 1/16/2025 visit):      therapeutic exercises to develop strength, endurance, ROM, and flexibility for 55 minutes including:    Warm-up  Bike 10 minutes     Supine  SKC B - 30 reps with towel  LTR B- 30 reps   SLR bilateral   LE - 30 reps   Hip abduction green TB B - 30 reps   Bridges 3x10   PPT - 30 reps with 3" hold        Seated    Sciatic nerve glides 3x10  Piriformis stretch 4x30s  Hamstring 3x30s B  TA activation  - 30 reps " "with 3" hold   Swiss ball roll outs 3 way x 10 each    Standing    Rows 3x10 red theraband           Home Exercises Provided and Patient Education Provided     Education provided:   - Continue with HEP    Written Home Exercises Provided: yes. Exercises were reviewed and Brandi was able to demonstrate them prior to the end of the session.  Brandi demonstrated fair  understanding of the education provided. See EMR under Patient Instructions for exercises provided during therapy sessions    Assessment     Pt completed her therapy and had no difficulty with addition of bike and theraband rows, as noted above, with no increase in symptoms prior to leaving the clinic.  Brandi will continue to benefit from skilled therapy.     Brandi Is progressing well towards her goals.   Pt prognosis is Fair.     Pt will continue to benefit from skilled outpatient physical therapy to address the deficits listed in the problem list box on initial evaluation, provide pt/family education and to maximize pt's level of independence in the home and community environment.     Pt's spiritual, cultural and educational needs considered and pt agreeable to plan of care and goals.    Anticipated barriers to physical therapy: None Identified at eval    Goals:  Short Term Goals: 4 weeks 2/6/2025 (4)        Goal   Status     Pt. to report decreased lumbar pain </ =  4/10 at worst to increase tolerance for upright unsupported sitting posture.     Pt. to demonstrate proper posture requiring minimum verbal cues from PT for improved posture positioning      Increase L1 - L3 joint mobility to 3/6 to promote greater ease with squat to stand transitioning.     Increase lumbar flexion AROM >= 25 to 65 degrees to promote greater ease with self-care.     Increase AROM lumbar rotation bilateral  >= +5 degrees to promote greater ease with driving.     Increase lumbar side bending bilateral >= +5 degrees to improve function reaching beyond base of support.    "   Pt. to demonstrate increased MMT for rectus abdominus  >=  4-/5 to improve supine to sitting transfer.     Pt. to demonstrate increased MMT for Lumbar extensor paraspinals t >=  4-/5 to improve tolerance for prolong standing and ambulation      Pt. to demonstrate increased MMT for Lumbar/thoracic rotators   >= 4-/5 to improve tolerance for ADL and work activities.      Pt. to demonstrate increased MMT for Lumbar/thoracic side bending   >= 4-/5 to improve household cleaning.      Pt to be independent with HEP to improve ROM and independence with ADL's           Long Term Goals: 8 weeks  3/6/2025 (8)     Goal   Status     Pt. to report decreased lumbar pain </ =  2/10 at worst to increase tolerance for upright unsupported sitting posture.     Pt. to demonstrate proper posture requiring minimum verbal cues from PT for improved posture positioning      Increase L3 - L5 joint mobility to 3/6 to promote greater ease with squat to stand transitioning.     Increase lumbar flexion AROM >= 30 to 70 degrees to promote greater ease with self-care.     Increase AROM lumbar rotation bilateral  >= +10 degrees to promote greater ease with driving.     Increase lumbar side bending bilateral >= +10 degrees to improve function reaching beyond base of support.      Pt. to demonstrate increased MMT for rectus abdominus  >=  4/5 to improve supine to sitting transfer.     Pt. to demonstrate increased MMT for Lumbar extensor paraspinals t >=  4/5 to improve tolerance for prolong standing and ambulation      Pt. to demonstrate increased MMT for Lumbar/thoracic rotators   >= 4/5 to improve tolerance for ADL and work activities.      Pt. to demonstrate increased MMT for Lumbar/thoracic side bending   >= 4/5 to improve household cleaning.      Pt to be independent with HEP to improve ROM and independence with ADL's         Plan     Continued with current POC      Sherif Reveles, PTA   1/16/2025

## 2025-01-21 RX ORDER — DICLOFENAC SODIUM 20 MG/G
SOLUTION TOPICAL
Qty: 112 G | Refills: 7 | OUTPATIENT
Start: 2025-01-21

## 2025-01-22 ENCOUNTER — TELEPHONE (OUTPATIENT)
Dept: ORTHOPEDICS | Facility: CLINIC | Age: 80
End: 2025-01-22
Payer: MEDICARE

## 2025-01-22 NOTE — TELEPHONE ENCOUNTER
Called and rescheduled patient's 1/23 appt with Anthony due to clinicweather closure. Patient agreed to new appt on 1/30/25.

## 2025-01-24 ENCOUNTER — CLINICAL SUPPORT (OUTPATIENT)
Dept: REHABILITATION | Facility: HOSPITAL | Age: 80
End: 2025-01-24
Payer: MEDICARE

## 2025-01-24 DIAGNOSIS — G89.29 CHRONIC BILATERAL LOW BACK PAIN WITHOUT SCIATICA: Primary | ICD-10-CM

## 2025-01-24 DIAGNOSIS — M54.50 CHRONIC BILATERAL LOW BACK PAIN WITHOUT SCIATICA: Primary | ICD-10-CM

## 2025-01-24 PROCEDURE — 97110 THERAPEUTIC EXERCISES: CPT | Mod: PN,CQ

## 2025-01-24 NOTE — PROGRESS NOTES
"  Outpatient Rehab    Physical Therapy Visit    Patient Name: Brandi Manuel  MRN: [id number]  YOB: 1945  Today's Date: 1/24/2025    Therapy Diagnosis:   Encounter Diagnosis   Name Primary?    Chronic bilateral low back pain without sciatica Yes     Physician: RICHIE Banks NP    Evaluation Date: 1/9/2025  Authorization Period Expiration: 12/27/25  Plan of Care Expiration: 4/2/25 or 24 Visit  Progress Note Due: 2/8/2025  Visit # / Visits authorized: 3/20  Visits Remaining - 12  PTA visit #: 3/6  Precautions: Standard per MD "physical therapy including strengthening and stretching exercises, home exercises, ROM, aerobic conditioning, aqua therapy, other modalities including ultrasound, massage, and dry needling, epidural steroid injections and finally surgical intervention. "       Time In:8:00   Time Out: 9:00   Total time: 60min / 60 min billed         Subjective   Pain has been on and off in low back and hips..  Pain reported as 5.      Past Medical History/Physical Systems Review:   Brandi Manuel  has no past medical history on file.    Brandi Manuel  has a past surgical history that includes Tonsillectomy; Hysterectomy; Shoulder surgery; and Breast biopsy.    Brandi has a current medication list which includes the following prescription(s): acetaminophen, amlodipine, bimatoprost, diclofenac sodium, gabapentin, meclizine, meloxicam, mirtazapine, ondansetron, triamcinolone acetonide 0.1%, and zolpidem.    Review of patient's allergies indicates:   Allergen Reactions    Codeine     Hydrocodone-acetaminophen Nausea Only        Objective            Treatment:  Therapeutic Exercise  Therapeutic Exercise Activity 1: Bridges 3x10  Therapeutic Exercise Activity 2: Hip abduction GTB 3x10  Therapeutic Exercise Activity 3: SLR 3x10 B  Therapeutic Exercise Activity 4: LTR 3x10  Therapeutic Exercise Activity 5: Hamstring stretch 3x30s B  Therapeutic Exercise Activity 6: Swiss Ball Roll Outs 3x10  Therapeutic " Exercise Activity 7: Piriformis stretch 3x30s B  Therapeutic Exercise Activity 8: TA activation x15 5s hold    Patient's spiritual, cultural, and educational needs considered and patient agreeable to plan of care and goals.     Assessment & Plan   Assessment: Pt reported decrease in pain as session continued. PTA educated pt on performed HEP in morning and in evening to manage pain and to reduce time on treadmill since ADLs already increase pain.  Evaluation/Treatment Tolerance: Patient tolerated treatment well        Plan of care Certification: 1/9/2025 to  4/3/2025 (12)     Outpatient Physical Therapy 2 times weekly for 12 weeks to include the following interventions: Gait Training, Manual Therapy, Neuromuscular Re-ed, Patient Education, Therapeutic Activities, Therapeutic Exercise, Ultrasound, and Integrative Dry Needling .     Goals:  Short Term Goals: 4 weeks 2/6/2025 (4)        Goal   Status     Pt. to report decreased lumbar pain </ =  4/10 at worst to increase tolerance for upright unsupported sitting posture.     Pt. to demonstrate proper posture requiring minimum verbal cues from PT for improved posture positioning      Increase L1 - L3 joint mobility to 3/6 to promote greater ease with squat to stand transitioning.     Increase lumbar flexion AROM >= 25 to 65 degrees to promote greater ease with self-care.     Increase AROM lumbar rotation bilateral  >= +5 degrees to promote greater ease with driving.     Increase lumbar side bending bilateral >= +5 degrees to improve function reaching beyond base of support.      Pt. to demonstrate increased MMT for rectus abdominus  >=  4-/5 to improve supine to sitting transfer.     Pt. to demonstrate increased MMT for Lumbar extensor paraspinals t >=  4-/5 to improve tolerance for prolong standing and ambulation      Pt. to demonstrate increased MMT for Lumbar/thoracic rotators   >= 4-/5 to improve tolerance for ADL and work activities.      Pt. to demonstrate  increased MMT for Lumbar/thoracic side bending   >= 4-/5 to improve household cleaning.      Pt to be independent with HEP to improve ROM and independence with ADL's           Long Term Goals: 8 weeks  3/6/2025 (8)     Goal   Status     Pt. to report decreased lumbar pain </ =  2/10 at worst to increase tolerance for upright unsupported sitting posture.     Pt. to demonstrate proper posture requiring minimum verbal cues from PT for improved posture positioning      Increase L3 - L5 joint mobility to 3/6 to promote greater ease with squat to stand transitioning.     Increase lumbar flexion AROM >= 30 to 70 degrees to promote greater ease with self-care.     Increase AROM lumbar rotation bilateral  >= +10 degrees to promote greater ease with driving.     Increase lumbar side bending bilateral >= +10 degrees to improve function reaching beyond base of support.      Pt. to demonstrate increased MMT for rectus abdominus  >=  4/5 to improve supine to sitting transfer.     Pt. to demonstrate increased MMT for Lumbar extensor paraspinals t >=  4/5 to improve tolerance for prolong standing and ambulation      Pt. to demonstrate increased MMT for Lumbar/thoracic rotators   >= 4/5 to improve tolerance for ADL and work activities.      Pt. to demonstrate increased MMT for Lumbar/thoracic side bending   >= 4/5 to improve household cleaning.      Pt to be independent with HEP to improve ROM and independence with ADL's

## 2025-01-27 ENCOUNTER — PATIENT MESSAGE (OUTPATIENT)
Dept: PAIN MEDICINE | Facility: OTHER | Age: 80
End: 2025-01-27
Payer: MEDICARE

## 2025-01-28 ENCOUNTER — CLINICAL SUPPORT (OUTPATIENT)
Dept: REHABILITATION | Facility: HOSPITAL | Age: 80
End: 2025-01-28
Payer: MEDICARE

## 2025-01-28 DIAGNOSIS — G89.29 CHRONIC BILATERAL LOW BACK PAIN WITHOUT SCIATICA: Primary | ICD-10-CM

## 2025-01-28 DIAGNOSIS — M54.50 CHRONIC BILATERAL LOW BACK PAIN WITHOUT SCIATICA: Primary | ICD-10-CM

## 2025-01-28 PROCEDURE — 97530 THERAPEUTIC ACTIVITIES: CPT | Mod: PN

## 2025-01-28 PROCEDURE — 97110 THERAPEUTIC EXERCISES: CPT | Mod: PN

## 2025-01-28 NOTE — PROGRESS NOTES
Outpatient Rehab    Physical Therapy Visit    Patient Name: Brandi Manuel  MRN: [id number]  YOB: 1945  Today's Date: 1/28/2025    Therapy Diagnosis: No diagnosis found.  Physician: RICHIE Banks NP    Physician Orders: Eval and Treat  Medical Diagnosis: Lumbar Fusion / Low Back Pain     Visit # / Visits Authorized:  4 / 20   Date of Evaluation:  1/9/25  Insurance Authorization Period: 1/9/25 to 12/31/25  Plan of Care Certification:  1/9/25 to 3/13/25      Time In:   0900  Time Out:  1000  Total Time:   60  Total Billable Time: 30         Subjective   suprisingly pain is not as high today ; going to see about my shoulder and knee pain as well.  Pain reported as 2.      Past Medical History/Physical Systems Review:   Brandi Manuel  has no past medical history on file.    Brandi Manuel  has a past surgical history that includes Tonsillectomy; Hysterectomy; Shoulder surgery; and Breast biopsy.    Brandi has a current medication list which includes the following prescription(s): acetaminophen, amlodipine, bimatoprost, diclofenac sodium, gabapentin, meclizine, meloxicam, mirtazapine, ondansetron, triamcinolone acetonide 0.1%, and zolpidem.    Review of patient's allergies indicates:   Allergen Reactions    Codeine     Hydrocodone-acetaminophen Nausea Only        Objective            Treatment:  Therapeutic Exercise  Therapeutic Exercise Activity 1: Bridges 3x10  Therapeutic Exercise Activity 2: joseph flexion  with ball behind back - 30 reps  Therapeutic Exercise Activity 3: SLR 3x10 B  Therapeutic Exercise Activity 4: LTR 3x10  Therapeutic Exercise Activity 8: TA activation x15 5s hold                   Patient's spiritual, cultural, and educational needs considered and patient agreeable to plan of care and goals.     Assessment & Plan   Assessment: Pt currently presents to therapy with reports of decreased pain with functional activity. However reports more pain now in the right shoulder and knee. Pt  however continues to display lack of lumbar mobility and strength/function of the lumbar spine. Pt will continue to benefit from skilled therapy to address deficits.  Evaluation/Treatment Tolerance: Patient tolerated treatment well        Plan: continue with current POC    Goals:   Active       LTG       Pt. to demonstrate increased MMT for Lumbar extensor paraspinals t >=  4/5 to improve tolerance for prolong standing and ambulation        Start:  01/28/25    Expected End:  04/22/25            Pt. to demonstrate increased MMT for Lumbar/thoracic rotators   >= 4/5 to improve tolerance for ADL and work activities.        Start:  01/28/25    Expected End:  04/22/25            Pt. to demonstrate increased MMT for Lumbar/thoracic side bending   >= 4/5 to improve household cleaning.       Start:  01/28/25    Expected End:  04/22/25               STG       Pt. to report decreased lumbar pain </ =  2/10 at worst to increase tolerance for upright unsupported sitting posture.         Start:  01/28/25    Expected End:  04/22/25       1. Pt. to report decreased lumbar pain </ =  4/10 at worst to increase tolerance for upright unsupported sitting posture.    2. Pt. to demonstrate proper posture requiring minimum verbal cues from PT for improved posture positioning     3. Increase L1 - L3 joint mobility to 3/6 to promote greater ease with squat to stand transitioning.    4. Increase lumbar flexion AROM >= 25 to 65 degrees to promote greater ease with self-care.    5. Increase AROM lumbar rotation bilateral  >= +5 degrees to promote greater ease with driving.    6. Increase lumbar side bending bilateral >= +5 degrees to improve function reaching beyond base of support.     7. Pt. to demonstrate increased MMT for rectus abdominus  >=  4-/5 to improve supine to sitting transfer.    8. Pt. to demonstrate increased MMT for Lumbar extensor paraspinals t >=  4-/5 to improve tolerance for prolong standing and ambulation     9. Pt. to  demonstrate increased MMT for Lumbar/thoracic rotators   >= 4-/5 to improve tolerance for ADL and work activities.     10. Pt. to demonstrate increased MMT for Lumbar/thoracic side bending   >= 4-/5 to improve household cleaning.     11. Pt to be independent with HEP to improve ROM and independence with ADL's          Long Term Goals: 8 weeks  3/6/2025 (8)     Goal   Status    1.   2. Pt. to demonstrate proper posture requiring minimum verbal cues from PT for improved posture positioning     3. Increase L3 - L5 joint mobility to 3/6 to promote greater ease with squat to stand transitioning.    4.     5.     6. Increase lumbar side bending bilateral >= +10 degrees to improve function reaching beyond base of support.     7. Pt. to demonstrate increased MMT for rectus abdominus  >=  4/5 to improve supine to sitting transfer.    8.     11. Pt to be independent with HEP to improve ROM and independence with ADL's              Increase lumbar flexion AROM >= 30 to 70 degrees to promote greater ease with self-care.       Start:  01/28/25    Expected End:  04/22/25            Increase AROM lumbar rotation bilateral  >= +10 degrees to promote greater ease with driving.       Start:  01/28/25    Expected End:  04/22/25              Resolved       Physical Therapy                 Physical Therapy Goal ( Error)       Start:  01/24/25    Resolved:  01/28/25

## 2025-01-29 ENCOUNTER — HOSPITAL ENCOUNTER (OUTPATIENT)
Facility: OTHER | Age: 80
Discharge: HOME | End: 2025-01-29
Attending: ANESTHESIOLOGY | Admitting: ANESTHESIOLOGY
Payer: MEDICARE

## 2025-01-29 VITALS
OXYGEN SATURATION: 100 % | RESPIRATION RATE: 18 BRPM | HEIGHT: 62 IN | TEMPERATURE: 99 F | DIASTOLIC BLOOD PRESSURE: 70 MMHG | HEART RATE: 60 BPM | SYSTOLIC BLOOD PRESSURE: 130 MMHG | BODY MASS INDEX: 28.16 KG/M2 | WEIGHT: 153 LBS

## 2025-01-29 DIAGNOSIS — G89.29 CHRONIC PAIN: ICD-10-CM

## 2025-01-29 DIAGNOSIS — M54.16 LUMBAR RADICULOPATHY: Primary | ICD-10-CM

## 2025-01-29 PROCEDURE — 25500020 PHARM REV CODE 255: Performed by: ANESTHESIOLOGY

## 2025-01-29 PROCEDURE — 62323 NJX INTERLAMINAR LMBR/SAC: CPT | Mod: ,,, | Performed by: ANESTHESIOLOGY

## 2025-01-29 PROCEDURE — 63600175 PHARM REV CODE 636 W HCPCS: Performed by: ANESTHESIOLOGY

## 2025-01-29 PROCEDURE — 62323 NJX INTERLAMINAR LMBR/SAC: CPT | Performed by: ANESTHESIOLOGY

## 2025-01-29 RX ORDER — FENTANYL CITRATE 50 UG/ML
INJECTION, SOLUTION INTRAMUSCULAR; INTRAVENOUS
Status: DISCONTINUED | OUTPATIENT
Start: 2025-01-29 | End: 2025-01-29 | Stop reason: HOSPADM

## 2025-01-29 RX ORDER — LIDOCAINE HYDROCHLORIDE 20 MG/ML
INJECTION, SOLUTION INFILTRATION; PERINEURAL
Status: DISCONTINUED | OUTPATIENT
Start: 2025-01-29 | End: 2025-01-29 | Stop reason: HOSPADM

## 2025-01-29 RX ORDER — SODIUM CHLORIDE 9 MG/ML
INJECTION, SOLUTION INTRAVENOUS CONTINUOUS
Status: DISCONTINUED | OUTPATIENT
Start: 2025-01-29 | End: 2025-01-29 | Stop reason: HOSPADM

## 2025-01-29 RX ORDER — DEXAMETHASONE SODIUM PHOSPHATE 10 MG/ML
INJECTION, SOLUTION INTRA-ARTICULAR; INTRALESIONAL; INTRAMUSCULAR; INTRAVENOUS; SOFT TISSUE
Status: DISCONTINUED | OUTPATIENT
Start: 2025-01-29 | End: 2025-01-29 | Stop reason: HOSPADM

## 2025-01-29 RX ORDER — LIDOCAINE HYDROCHLORIDE 10 MG/ML
INJECTION, SOLUTION EPIDURAL; INFILTRATION; INTRACAUDAL; PERINEURAL
Status: DISCONTINUED | OUTPATIENT
Start: 2025-01-29 | End: 2025-01-29 | Stop reason: HOSPADM

## 2025-01-29 NOTE — OP NOTE
Lumbar Interlaminar Epidural Steroid Injection under Fluoroscopic Guidance    The procedure, risks, benefits, and options were discussed with the patient. There are no contraindications to the procedure. The patent expressed understanding and agreed to the procedure. Informed written consent was obtained prior to the start of the procedure and can be found in the patient's chart.    PATIENT NAME: Brandi Manuel   MRN: [id number]     DATE OF PROCEDURE: 01/29/2025    PROCEDURE: Lumbar Interlaminar Epidural Steroid Injection L2/L3 under Fluoroscopic Guidance    PRE-OP DIAGNOSIS: Lumbar radiculopathy [M54.16] Lumbar radiculopathy [M54.16]      POST-OP DIAGNOSIS: Same    PHYSICIAN: Radha Noel MD    ASSISTANTS: Dr. Angeles     MEDICATIONS INJECTED: Preservative-free Decadron 10mg with 4cc of Lidocaine 1% MPF and preservative free normal saline    LOCAL ANESTHETIC INJECTED: Xylocaine 2%     SEDATION: Versed 0 and Fentanyl 25mcg                                                                                                                                                                                     Conscious sedation ordered by M.D. Patient re-evaluation prior to administration of conscious sedation. No changes noted in patient's status from initial evaluation. The patient's vital signs were monitored by RN and patient remained hemodynamically stable throughout the procedure.    Event Time In   Sedation Start 1222   Sedation End 1231       ESTIMATED BLOOD LOSS: None    COMPLICATIONS: None    TECHNIQUE: Time-out was performed to identify the patient and procedure to be performed. With the patient laying in a prone position, the surgical area was prepped and draped in the usual sterile fashion using ChloraPrep and a fenestrated drape. The level was determined under fluoroscopy guidance. Skin anesthesia was achieved by injecting Lidocaine 2% over the injection site. The interlaminar space was then approached with a 20  gauge,  3.5 inch Tuohy needle that was introduced under fluoroscopic guidance in the AP, lateral and/or contralateral oblique imaging. Once the Ligamentum flavum was encountered loss of resistance to air was used to enter the epidural space. With positive loss of resistance and negative aspiration for CSF or Blood, contrast dye Omnipaque (300mg/mL) was injected to confirm placement and there was no vascular runoff. 5 mL of the medication mixture listed above was injected slowly. Displacement of the radio opaque contrast after injection of the medication confirmed that the medication went into the area of the epidural space. The needles were removed and bleeding was nil. A sterile dressing was applied. No specimens collected. The patient tolerated the procedure well.     The patient was monitored after the procedure in the recovery area. They were given post-procedure and discharge instructions to follow at home. The patient was discharged in a stable condition.    Radha Noel MD

## 2025-01-29 NOTE — H&P
HPI  Patient presenting for Procedure(s) (LRB):  LUMBAR L3/4 IL XIANG DIRECT REFERRAL (N/A)     Patient on Anti-coagulation No    No health changes since previous encounter    No past medical history on file.  Past Surgical History:   Procedure Laterality Date    BREAST BIOPSY      HYSTERECTOMY      SHOULDER SURGERY      TONSILLECTOMY       Review of patient's allergies indicates:   Allergen Reactions    Codeine     Hydrocodone-acetaminophen Nausea Only      No current facility-administered medications for this encounter.       PMHx, PSHx, Allergies, Medications reviewed in epic    ROS negative except pain complaints in HPI    OBJECTIVE:    There were no vitals taken for this visit.    PHYSICAL EXAMINATION:    GENERAL: Well appearing, in no acute distress, alert and oriented x3.  PSYCH:  Mood and affect appropriate.  SKIN: Skin color, texture, turgor normal, no rashes or lesions which will impact the procedure.  CV: RRR with palpation of the radial artery.  PULM: No evidence of respiratory difficulty, symmetric chest rise. Clear to auscultation.  NEURO: Cranial nerves grossly intact.    Plan:    Proceed with procedure as planned Procedure(s) (LRB):  LUMBAR L3/4 IL XIANG DIRECT REFERRAL (N/A)    Shay Angeles MD  01/29/2025

## 2025-01-29 NOTE — DISCHARGE INSTRUCTIONS

## 2025-01-29 NOTE — DISCHARGE SUMMARY
Discharge Note  Short Stay      SUMMARY     Admit Date: 1/29/2025    Attending Physician: Radha Noel      Discharge Physician: Radha Noel      Discharge Date: 1/29/2025 12:32 PM    Procedure(s) (LRB):  LUMBAR L2/3 XIANG DIRECT REFERRAL (N/A)    Final Diagnosis: Lumbar radiculopathy [M54.16]    Disposition: Home or self care    Patient Instructions:   Current Discharge Medication List        CONTINUE these medications which have NOT CHANGED    Details   acetaminophen (TYLENOL) 650 MG TbSR Take 1,300 mg by mouth once daily.      amLODIPine (NORVASC) 5 MG tablet Take 1 tablet (5 mg total) by mouth once daily. High blood pressure  Qty: 90 tablet, Refills: 1    Comments: .  Associated Diagnoses: Essential hypertension      bimatoprost (LATISSE) 0.03 % ophthalmic solution Place one drop on applicator and apply evenly along the skin of the upper eyelid at base of eyelashes once daily at bedtime; repeat procedure for second eye (use a clean applicator).  Qty: 5 mL, Refills: 6    Associated Diagnoses: Encounter for cosmetic procedure      diclofenac sodium 2 % SoPk Apply 1 Pump topically every 12 (twelve) hours as needed (pain).  Qty: 1 g, Refills: 6      gabapentin (NEURONTIN) 100 MG capsule Take 1-3 capsules (100-300 mg total) by mouth every evening.  Qty: 90 capsule, Refills: 11    Associated Diagnoses: S/P spinal surgery; Lumbar radiculopathy      meclizine (ANTIVERT) 25 mg tablet Take 1 tablet (25 mg total) by mouth 2 (two) times daily as needed for Dizziness.  Qty: 90 tablet, Refills: 2    Associated Diagnoses: Benign paroxysmal vertigo, bilateral      meloxicam (MOBIC) 15 MG tablet Take 0.5 tablets (7.5 mg total) by mouth daily as needed for Pain (take with food (avoid taking with ibuprofen, naproxen)).  Qty: 45 tablet, Refills: 2      mirtazapine (REMERON) 7.5 MG Tab Take 7.5 mg by mouth nightly as needed.      ondansetron (ZOFRAN-ODT) 4 MG TbDL Take 1 tablet (4 mg total) by mouth every 8 (eight) hours as  needed (nausea / vomiting).  Qty: 20 tablet, Refills: 0    Associated Diagnoses: Nausea and vomiting, unspecified vomiting type      triamcinolone acetonide 0.1% (KENALOG) 0.1 % ointment Apply topically 3 (three) times daily.  Qty: 453.6 g, Refills: 3    Associated Diagnoses: Rash and nonspecific skin eruption      zolpidem (AMBIEN CR) 12.5 MG CR tablet Take 12.5 mg by mouth.                 Discharge Diagnosis: Lumbar radiculopathy [M54.16]  Condition on Discharge: Stable with no complications to procedure   Diet on Discharge: Same as before.  Activity: as per instruction sheet.  Discharge to: Home with a responsible adult.  Follow up: 2-4 weeks       Please call my office or pager at [phone] if experienced any weakness or loss of sensation, fever > 101.5, pain uncontrolled with oral medications, persistent nausea/vomiting/or diarrhea, redness or drainage from the incisions, or any other worrisome concerns. If physician on call was not reached or could not communicate with our office for any reason please go to the nearest emergency department      Radha Noel  01/29/2025

## 2025-01-30 ENCOUNTER — HOSPITAL ENCOUNTER (OUTPATIENT)
Dept: RADIOLOGY | Facility: HOSPITAL | Age: 80
Discharge: HOME | End: 2025-01-30
Payer: MEDICARE

## 2025-01-30 ENCOUNTER — CLINICAL SUPPORT (OUTPATIENT)
Dept: REHABILITATION | Facility: HOSPITAL | Age: 80
End: 2025-01-30
Payer: MEDICARE

## 2025-01-30 ENCOUNTER — OFFICE VISIT (OUTPATIENT)
Dept: ORTHOPEDICS | Facility: CLINIC | Age: 80
End: 2025-01-30
Payer: MEDICARE

## 2025-01-30 VITALS — WEIGHT: 159.81 LBS | HEIGHT: 62 IN | BODY MASS INDEX: 29.41 KG/M2

## 2025-01-30 DIAGNOSIS — G89.29 CHRONIC PAIN OF BOTH KNEES: ICD-10-CM

## 2025-01-30 DIAGNOSIS — M17.0 BILATERAL PRIMARY OSTEOARTHRITIS OF KNEE: Primary | ICD-10-CM

## 2025-01-30 DIAGNOSIS — M25.561 CHRONIC PAIN OF BOTH KNEES: ICD-10-CM

## 2025-01-30 DIAGNOSIS — G89.29 CHRONIC BILATERAL LOW BACK PAIN WITHOUT SCIATICA: Primary | ICD-10-CM

## 2025-01-30 DIAGNOSIS — M54.50 CHRONIC BILATERAL LOW BACK PAIN WITHOUT SCIATICA: Primary | ICD-10-CM

## 2025-01-30 DIAGNOSIS — M25.562 CHRONIC PAIN OF BOTH KNEES: ICD-10-CM

## 2025-01-30 PROCEDURE — 73564 X-RAY EXAM KNEE 4 OR MORE: CPT | Mod: 26,50,, | Performed by: RADIOLOGY

## 2025-01-30 PROCEDURE — 99213 OFFICE O/P EST LOW 20 MIN: CPT | Mod: PBBFAC,25

## 2025-01-30 PROCEDURE — 99999 PR PBB SHADOW E&M-EST. PATIENT-LVL III: CPT | Mod: PBBFAC,,,

## 2025-01-30 PROCEDURE — 97110 THERAPEUTIC EXERCISES: CPT | Mod: PN,CQ

## 2025-01-30 PROCEDURE — 99213 OFFICE O/P EST LOW 20 MIN: CPT | Mod: S$PBB,,,

## 2025-01-30 PROCEDURE — 73564 X-RAY EXAM KNEE 4 OR MORE: CPT | Mod: TC,50

## 2025-01-30 NOTE — PROGRESS NOTES
Outpatient Rehab    Physical Therapy Visit    Patient Name: Brandi Manuel  MRN: [id number]  YOB: 1945  Today's Date: 1/30/2025    Therapy Diagnosis:   Encounter Diagnosis   Name Primary?    Chronic bilateral low back pain without sciatica Yes     Physician: RICHIE Banks NP      Evaluation Date: 1/9/2025  Authorization Period Expiration: 12/27/25  Plan of Care Expiration: 4/2/25 or 24 Visit  Progress Note Due: 2/8/2025  Visit # / Visits authorized: 5/20  Visits Remaining - 10  PTA visit #: 1/6      Time In: 8:00    Time Out: 9:00   Total Time: 60 min    Total Billable Time: 60 mn         Subjective   Pt recieved lumbar injection yesterday. Back feels good.  Pain reported as 0.      Objective            Treatment:  Therapeutic Exercise  Therapeutic Exercise Activity 1: Bridges 3x10  Therapeutic Exercise Activity 2: Supine Hip abd GTB /adduction 3x10 ea  Therapeutic Exercise Activity 3: SLR 3x10 B  Therapeutic Exercise Activity 4: LTR 3x10  Therapeutic Exercise Activity 5: Hamstring stretch 3x30s B  Therapeutic Exercise Activity 6: Swiss Ball Roll Outs 3x10  Therapeutic Exercise Activity 7: Dowel Flex w/ ball behind back 3x10    Patient's spiritual, cultural, and educational needs considered and patient agreeable to plan of care and goals.     Assessment & Plan   Assessment: No lumbar pain today. Exercsies completed with ease today. PTA advised pt to take breaks to prevent aggrevation of injection site. PTA also advised pt to refrain from doing too much around the house becuase she's feeling good, and to rest as recommended by doctor.  Evaluation/Treatment Tolerance: Patient tolerated treatment well        Plan:      Goals:   Active       LTG       Pt. to demonstrate increased MMT for Lumbar extensor paraspinals t >=  4/5 to improve tolerance for prolong standing and ambulation        Start:  01/28/25    Expected End:  04/22/25            Pt. to demonstrate increased MMT for Lumbar/thoracic rotators    >= 4/5 to improve tolerance for ADL and work activities.        Start:  01/28/25    Expected End:  04/22/25            Pt. to demonstrate increased MMT for Lumbar/thoracic side bending   >= 4/5 to improve household cleaning.       Start:  01/28/25    Expected End:  04/22/25               STG       Pt. to report decreased lumbar pain </ =  2/10 at worst to increase tolerance for upright unsupported sitting posture.         Start:  01/28/25    Expected End:  04/22/25                  Increase lumbar flexion AROM >= 30 to 70 degrees to promote greater ease with self-care.       Start:  01/28/25    Expected End:  04/22/25            Increase AROM lumbar rotation bilateral  >= +10 degrees to promote greater ease with driving.       Start:  01/28/25    Expected End:  04/22/25              Resolved       Physical Therapy                 Physical Therapy Goal ( Error)       Start:  01/24/25    Resolved:  01/28/25

## 2025-01-31 NOTE — PROGRESS NOTES
SUBJECTIVE:     Chief Complaint & History of Present Illness:  Brandi Manuel is a 79 y.o. female who is seen here today with a complaint of bilateral knee pain that has worsened in her left knee. The pain has been present for many years. The patient describes the pain as a moderate no leak located in the diffuse aspect of each knee. The pain is worse with walking and improved by nothing. The patient notes no associated injury, effusion, knee giving out, knee locking, crepitus sensation.  Patient patient is currently taking meloxicam and acetaminophen 650 mg with mild relief.  She also received a bilateral knee intra-articular CSI in 2021 by Dr. Akhtar with great relief.      No past medical history on file.    Past Surgical History:   Procedure Laterality Date    BREAST BIOPSY      HYSTERECTOMY      SHOULDER SURGERY      TONSILLECTOMY      TRANSFORAMINAL EPIDURAL INJECTION OF STEROID N/A 1/29/2025    Procedure: LUMBAR L2/3 XIANG DIRECT REFERRAL;  Surgeon: Radha Noel MD;  Location: [facility];  Service: Pain Management;  Laterality: N/A;       Family History   Problem Relation Name Age of Onset    Breast cancer Daughter         Review of patient's allergies indicates:   Allergen Reactions    Codeine     Hydrocodone-acetaminophen Nausea Only           Current Outpatient Medications:     acetaminophen (TYLENOL) 650 MG TbSR, Take 1,300 mg by mouth once daily., Disp: , Rfl:     amLODIPine (NORVASC) 5 MG tablet, Take 1 tablet (5 mg total) by mouth once daily. High blood pressure, Disp: 90 tablet, Rfl: 1    bimatoprost (LATISSE) 0.03 % ophthalmic solution, Place one drop on applicator and apply evenly along the skin of the upper eyelid at base of eyelashes once daily at bedtime; repeat procedure for second eye (use a clean applicator)., Disp: 5 mL, Rfl: 6    diclofenac sodium 2 % SoPk, Apply 1 Pump topically every 12 (twelve) hours as needed (pain)., Disp: 1 g, Rfl: 6    gabapentin (NEURONTIN) 100 MG capsule, Take  "1-3 capsules (100-300 mg total) by mouth every evening., Disp: 90 capsule, Rfl: 11    meclizine (ANTIVERT) 25 mg tablet, Take 1 tablet (25 mg total) by mouth 2 (two) times daily as needed for Dizziness., Disp: 90 tablet, Rfl: 2    meloxicam (MOBIC) 15 MG tablet, Take 0.5 tablets (7.5 mg total) by mouth daily as needed for Pain (take with food (avoid taking with ibuprofen, naproxen))., Disp: 45 tablet, Rfl: 2    mirtazapine (REMERON) 7.5 MG Tab, Take 7.5 mg by mouth nightly as needed., Disp: , Rfl:     ondansetron (ZOFRAN-ODT) 4 MG TbDL, Take 1 tablet (4 mg total) by mouth every 8 (eight) hours as needed (nausea / vomiting)., Disp: 20 tablet, Rfl: 0    triamcinolone acetonide 0.1% (KENALOG) 0.1 % ointment, Apply topically 3 (three) times daily., Disp: 453.6 g, Rfl: 3    zolpidem (AMBIEN CR) 12.5 MG CR tablet, Take 12.5 mg by mouth., Disp: , Rfl:       Review of Systems:  ROS:  The updated medical history is in the chart and has been reviewed. A review of systems is updated and there is no reported vision changes, ear/nose/mouth/throat complaints, chest pain, shortness of breath, abdominal pain, urological complaints, fevers or chills, psychiatric complaints. Musculoskeletal and neurologcial symptoms are as documented. All other systems are negative.      OBJECTIVE:     PHYSICAL EXAM:  Ht 5' 2" (1.575 m)   Wt 72.5 kg (159 lb 13.3 oz)   BMI 29.23 kg/m²   General: Pleasant, cooperative, NAD.  HEENT: NCAT, sclera nonicteric.  Lungs: Respirations are equal and unlabored.   Abdomen: Soft and non-tender.  CV: 2+ bilateral upper and lower extremity pulses.  Neuro: Sensation intact to light touch.  Skin: Intact throughout LE with no rashes, erythema, or lesions.  Extremities: No LE edema, NVI lower extremities.  Mildly antalgic gait.    left Knee Exam:  Knee Range of Motion:  0-120   Effusion:  Mild  Condition of skin: intact  Location of tenderness:  Lateral joint line   Strength: 5/5 quadriceps strength, 5/5 " gastroc-soleus strength, 5/5 hamstring strength, and 5/5 tibialis anterior strength  Stability: stable to testing  Knee Alignment: normal    right Knee Exam:  Knee Range of Motion:  0-120   Effusion: none  Condition of skin: intact  Location of tenderness: Medial joint line   Strength: 5/5 quadriceps strength, 5/5 gastroc-soleus strength, 5/5 hamstring strength, and 5/5 tibialis anterior strength  Knee alignment: normal  Stability: stable to testing      RADIOGRAPHS:  X-rays of the bilateral knee taken today personally reviewed. Imaging reveals tricompartmental joint space narrowing of the bilateral knees worse in the lateral tibiofemoral joint space of the left knee and medial tibiofemoral joint space of the right knee with osteophyte formation.      ASSESSMENT:       ICD-10-CM ICD-9-CM   1. Bilateral primary osteoarthritis of knee  M17.0 715.16       PLAN:     We discussed with the patient at length all the different treatment options available including anti-inflammatories, acetaminophen, rest, ice, knee strengthening exercise, occasional cortisone injections for temporary relief, Viscosupplimentation injections, arthroscopic debridement, osteotomy, and finally knee arthroplasty.     Patient recently received a lumbar CSI and would like to hold off on receiving bilateral knee intra-articular CSI for one-week.    Follow up in one-week for bilateral knee intra-articular CSI.        DISCLAIMER: This note was prepared with [employer] voice recognition transcription software. Garbled syntax, mangled pronouns, and other bizarre constructions may be attributed to that software system.    Anthony Biggs Jr., GABRIELA

## 2025-02-04 ENCOUNTER — CLINICAL SUPPORT (OUTPATIENT)
Dept: REHABILITATION | Facility: HOSPITAL | Age: 80
End: 2025-02-04
Payer: MEDICARE

## 2025-02-04 DIAGNOSIS — G89.29 CHRONIC BILATERAL LOW BACK PAIN WITHOUT SCIATICA: Primary | ICD-10-CM

## 2025-02-04 DIAGNOSIS — M54.50 CHRONIC BILATERAL LOW BACK PAIN WITHOUT SCIATICA: Primary | ICD-10-CM

## 2025-02-04 PROCEDURE — 97110 THERAPEUTIC EXERCISES: CPT | Mod: PN,CQ

## 2025-02-04 NOTE — PROGRESS NOTES
Outpatient Rehab    Physical Therapy Visit    Patient Name: Brandi Manuel  MRN: [id number]  YOB: 1945  Today's Date: 2/4/2025    Therapy Diagnosis:   Encounter Diagnosis   Name Primary?    Chronic bilateral low back pain without sciatica Yes     Physician: RICHIE Banks NP    Evaluation Date: 1/9/2025  Authorization Period Expiration: 12/27/25  Plan of Care Expiration: 4/2/25 or 24 Visit  Progress Note Due: 2/8/2025  Visit # / Visits authorized: 6/20  Visits Remaining - 9  PTA visit #: 2/6  Time In: 8:00    Time Out: 9:00   Total Time: 10:00    Total Billable Time: 60 min         Subjective   No change noted today.         Objective            Treatment:  Therapeutic Exercise  Therapeutic Exercise Activity 1: Bridges 3x10  Therapeutic Exercise Activity 2: Supine Hip abd GTB /adduction 3x10 ea  Therapeutic Exercise Activity 3: SLR 3x10 B  Therapeutic Exercise Activity 4: LTR 3x10  Therapeutic Exercise Activity 5: Hamstring stretch 3x30s B  Therapeutic Exercise Activity 6: Swiss Ball Roll Outs 3x10  Therapeutic Exercise Activity 7: Dowel Flex w/ ball behind back 3x10  Therapeutic Exercise Activity 9: RTB Rows w/ TA bracing 3x10  Therapeutic Exercise Activity 10: RTB  Sh ext w/ TA bracing 3x10    Patient's spiritual, cultural, and educational needs considered and patient agreeable to plan of care and goals.     Assessment & Plan   Assessment: No difficulty w/ exercises today. Weighted sit <> stands added to improve LE strength. Weight added to supine dowel flexion to improve UE strength and provide stretch for lat musculature. Brandi had no adverse effects w' exercises and will continue to benefit from skilled therapy.  Evaluation/Treatment Tolerance: Patient tolerated treatment well        Plan:      Goals:   Active       LTG       Pt. to demonstrate increased MMT for Lumbar extensor paraspinals t >=  4/5 to improve tolerance for prolong standing and ambulation  (Progressing)       Start:  01/28/25     Expected End:  04/22/25            Pt. to demonstrate increased MMT for Lumbar/thoracic rotators   >= 4/5 to improve tolerance for ADL and work activities.  (Progressing)       Start:  01/28/25    Expected End:  04/22/25            Pt. to demonstrate increased MMT for Lumbar/thoracic side bending   >= 4/5 to improve household cleaning. (Progressing)       Start:  01/28/25    Expected End:  04/22/25               STG       Pt. to report decreased lumbar pain </ =  2/10 at worst to increase tolerance for upright unsupported sitting posture.   (Progressing)       Start:  01/28/25    Expected End:  04/22/25                  Increase lumbar flexion AROM >= 30 to 70 degrees to promote greater ease with self-care. (Progressing)       Start:  01/28/25    Expected End:  04/22/25            Increase AROM lumbar rotation bilateral  >= +10 degrees to promote greater ease with driving. (Progressing)       Start:  01/28/25    Expected End:  04/22/25              Resolved       Physical Therapy                 Physical Therapy Goal ( Error)       Start:  01/24/25    Resolved:  01/28/25             Hoang Baxter PTA

## 2025-02-06 ENCOUNTER — PATIENT MESSAGE (OUTPATIENT)
Dept: ORTHOPEDICS | Facility: CLINIC | Age: 80
End: 2025-02-06
Payer: MEDICARE

## 2025-02-06 ENCOUNTER — CLINICAL SUPPORT (OUTPATIENT)
Dept: REHABILITATION | Facility: HOSPITAL | Age: 80
End: 2025-02-06
Payer: MEDICARE

## 2025-02-06 ENCOUNTER — OFFICE VISIT (OUTPATIENT)
Dept: ORTHOPEDICS | Facility: CLINIC | Age: 80
End: 2025-02-06
Payer: MEDICARE

## 2025-02-06 DIAGNOSIS — M17.0 BILATERAL PRIMARY OSTEOARTHRITIS OF KNEE: Primary | ICD-10-CM

## 2025-02-06 DIAGNOSIS — G89.29 CHRONIC BILATERAL LOW BACK PAIN WITHOUT SCIATICA: Primary | ICD-10-CM

## 2025-02-06 DIAGNOSIS — M54.50 CHRONIC BILATERAL LOW BACK PAIN WITHOUT SCIATICA: Primary | ICD-10-CM

## 2025-02-06 PROCEDURE — 20610 DRAIN/INJ JOINT/BURSA W/O US: CPT | Mod: 50,PBBFAC

## 2025-02-06 PROCEDURE — 99999 PR PBB SHADOW E&M-EST. PATIENT-LVL III: CPT | Mod: PBBFAC,,,

## 2025-02-06 PROCEDURE — 99213 OFFICE O/P EST LOW 20 MIN: CPT | Mod: PBBFAC

## 2025-02-06 PROCEDURE — 97110 THERAPEUTIC EXERCISES: CPT | Mod: PN,CQ

## 2025-02-06 PROCEDURE — 99499 UNLISTED E&M SERVICE: CPT | Mod: S$PBB,,,

## 2025-02-06 PROCEDURE — 20610 DRAIN/INJ JOINT/BURSA W/O US: CPT | Mod: 50,S$PBB,,

## 2025-02-06 NOTE — PROGRESS NOTES
Outpatient Rehab    Physical Therapy Visit    Patient Name: Brandi Manuel  MRN: [id number]  YOB: 1945  Today's Date: 2/6/2025    Therapy Diagnosis:   Encounter Diagnosis   Name Primary?    Chronic bilateral low back pain without sciatica Yes     Physician: RICHIE Banks NP    Evaluation Date: 1/9/2025  Authorization Period Expiration: 12/27/25  Plan of Care Expiration: 4/2/25 or 24 Visit  Progress Note Due: 2/8/2025  Visit # / Visits authorized: 7/20  Visits Remaining - 8  PTA visit #: 3/6  Time In: 8:00    Time Out: 9:00   Total Time: 10:00    Total Billable Time: 30 min    Time In: 8:00    Time Out: 9:00   Total Time: 60 min    Total Billable Time: 60 min         Subjective   No pain.  Pain reported as 0/10.      Objective            Treatment:  Therapeutic Exercise  Therapeutic Exercise Activity 1: Bridges 3x10  Therapeutic Exercise Activity 3: SLR 3x10 B #2  Therapeutic Exercise Activity 4: LTR 3x10  Therapeutic Exercise Activity 5: Hamstring stretch 3x30s B  Therapeutic Exercise Activity 6: Swiss Ball Roll Outs 3x10  Therapeutic Exercise Activity 7: Dowel Flex w/ ball behind back 3x10  Therapeutic Exercise Activity 9: BTB Rows w/ TA bracing 3x10  Therapeutic Exercise Activity 10: BTB  Sh ext w/ TA bracing 3x10    Patient's spiritual, cultural, and educational needs considered and patient agreeable to plan of care and goals.     Assessment & Plan   Assessment: Pt presented w/o pain and had no difficulty w/ exercises even after progressed. PTA discussed the possibility of concluding PT for low back to focus on her knees which have been painful lately. PTA  will discuss this with PT before next session.  Evaluation/Treatment Tolerance: Patient tolerated treatment well        Plan:      Goals:   Active       LTG       Pt. to demonstrate increased MMT for Lumbar extensor paraspinals t >=  4/5 to improve tolerance for prolong standing and ambulation  (Progressing)       Start:  01/28/25    Expected  End:  04/22/25            Pt. to demonstrate increased MMT for Lumbar/thoracic rotators   >= 4/5 to improve tolerance for ADL and work activities.  (Progressing)       Start:  01/28/25    Expected End:  04/22/25            Pt. to demonstrate increased MMT for Lumbar/thoracic side bending   >= 4/5 to improve household cleaning. (Progressing)       Start:  01/28/25    Expected End:  04/22/25               STG       Pt. to report decreased lumbar pain </ =  2/10 at worst to increase tolerance for upright unsupported sitting posture.   (Progressing)       Start:  01/28/25    Expected End:  04/22/25                  Increase lumbar flexion AROM >= 30 to 70 degrees to promote greater ease with self-care. (Progressing)       Start:  01/28/25    Expected End:  04/22/25            Increase AROM lumbar rotation bilateral  >= +10 degrees to promote greater ease with driving. (Progressing)       Start:  01/28/25    Expected End:  04/22/25              Resolved       Physical Therapy                 Physical Therapy Goal ( Error)       Start:  01/24/25    Resolved:  01/28/25             Hoang Baxter PTA

## 2025-02-06 NOTE — PROGRESS NOTES
Outpatient Rehab    Physical Therapy Progress Note    Patient Name: Brandi Manuel  MRN: [id number]  YOB: 1945  Today's Date: 2/6/2025    Therapy Diagnosis:   Encounter Diagnosis   Name Primary?    Chronic bilateral low back pain without sciatica Yes     Physician: RICHIE Banks NP    Physician Orders: Eval and Treat  Evaluation Date: 1/9/2025  Authorization Period Expiration: 12/27/25  Plan of Care Expiration: 4/2/25 or 24 Visit  Progress Note Due: 3/6/2025  Visit # / Visits authorized: 7/20  Visits Remaining - 8  PTA visit #: 0/6             Objective      Lumbar Range of Motion   Active (deg) Passive (deg) Pain   Flexion 95       Extension 45       Right Lateral Flexion 35       Right Rotation 55       Left Lateral Flexion 35       Left Rotation 55                       Intake Outcome Measure for FOTO Survey    Therapist reviewed FOTO scores for Brandi Manuel on 2/6/2025.   FOTO report - see Media section or FOTO account episode details.     Intake Score:  %  Survey Score 1:  %  Survey Score 2:  %        Patient's spiritual, cultural, and educational needs considered and patient agreeable to plan of care and goals.     Assessment & Plan   Assessment: Pt presents to therapy with improved tolerance for therapy interventions and functional activity. Pt has improve lumbar ROM in all planes and will not transition to referal           Plan:      Goals:   Active       LTG       Pt. to demonstrate increased MMT for Lumbar extensor paraspinals t >=  4/5 to improve tolerance for prolong standing and ambulation  (Progressing)       Start:  01/28/25    Expected End:  04/22/25            Pt. to demonstrate increased MMT for Lumbar/thoracic rotators   >= 4/5 to improve tolerance for ADL and work activities.  (Progressing)       Start:  01/28/25    Expected End:  04/22/25            Pt. to demonstrate increased MMT for Lumbar/thoracic side bending   >= 4/5 to improve household cleaning. (Progressing)        Start:  01/28/25    Expected End:  04/22/25               STG       Pt. to report decreased lumbar pain </ =  2/10 at worst to increase tolerance for upright unsupported sitting posture.   (Progressing)       Start:  01/28/25    Expected End:  04/22/25                  Increase lumbar flexion AROM >= 30 to 70 degrees to promote greater ease with self-care. (Progressing)       Start:  01/28/25    Expected End:  04/22/25            Increase AROM lumbar rotation bilateral  >= +10 degrees to promote greater ease with driving. (Progressing)       Start:  01/28/25    Expected End:  04/22/25              Resolved       Physical Therapy                 Physical Therapy Goal ( Error)       Start:  01/24/25    Resolved:  01/28/25             Zion Roman, PT

## 2025-02-07 PROCEDURE — 99999PBSHW PR PBB SHADOW TECHNICAL ONLY FILED TO HB: Mod: PBBFAC,,,

## 2025-02-07 RX ORDER — TRIAMCINOLONE ACETONIDE 40 MG/ML
80 INJECTION, SUSPENSION INTRA-ARTICULAR; INTRAMUSCULAR ONCE
Status: COMPLETED | OUTPATIENT
Start: 2025-02-07 | End: 2025-02-07

## 2025-02-07 RX ADMIN — TRIAMCINOLONE ACETONIDE 80 MG: 40 INJECTION, SUSPENSION INTRA-ARTICULAR; INTRAMUSCULAR at 07:02

## 2025-02-07 NOTE — PROGRESS NOTES
SUBJECTIVE:     Chief Complaint & History of Present Illness:  Brandi Manuel is a 79 y.o. female who is seen here today with complaint of bilateral knee pain.  Patient was previously seen by Dr. Akhtar on 07/22/2021 in which she received right knee intra-articular CSI with great relief.  Patient is currently diagnosed with bilateral knee primary osteoarthritis and would like to receive a bilateral knee intra-articular CSI today.      No past medical history on file.    Past Surgical History:   Procedure Laterality Date    BREAST BIOPSY      HYSTERECTOMY      SHOULDER SURGERY      TONSILLECTOMY      TRANSFORAMINAL EPIDURAL INJECTION OF STEROID N/A 1/29/2025    Procedure: LUMBAR L2/3 XIANG DIRECT REFERRAL;  Surgeon: Radha Noel MD;  [facility];  Service: Pain Management;  Laterality: N/A;       Family History   Problem Relation Name Age of Onset    Breast cancer Daughter         Review of patient's allergies indicates:   Allergen Reactions    Codeine     Hydrocodone-acetaminophen Nausea Only           Current Outpatient Medications:     acetaminophen (TYLENOL) 650 MG TbSR, Take 1,300 mg by mouth once daily., Disp: , Rfl:     amLODIPine (NORVASC) 5 MG tablet, Take 1 tablet (5 mg total) by mouth once daily. High blood pressure, Disp: 90 tablet, Rfl: 1    bimatoprost (LATISSE) 0.03 % ophthalmic solution, Place one drop on applicator and apply evenly along the skin of the upper eyelid at base of eyelashes once daily at bedtime; repeat procedure for second eye (use a clean applicator)., Disp: 5 mL, Rfl: 6    diclofenac sodium 2 % SoPk, Apply 1 Pump topically every 12 (twelve) hours as needed (pain)., Disp: 1 g, Rfl: 6    gabapentin (NEURONTIN) 100 MG capsule, Take 1-3 capsules (100-300 mg total) by mouth every evening., Disp: 90 capsule, Rfl: 11    meclizine (ANTIVERT) 25 mg tablet, Take 1 tablet (25 mg total) by mouth 2 (two) times daily as needed for Dizziness., Disp: 90 tablet, Rfl: 2    meloxicam  (MOBIC) 15 MG tablet, Take 0.5 tablets (7.5 mg total) by mouth daily as needed for Pain (take with food (avoid taking with ibuprofen, naproxen))., Disp: 45 tablet, Rfl: 2    mirtazapine (REMERON) 7.5 MG Tab, Take 7.5 mg by mouth nightly as needed., Disp: , Rfl:     ondansetron (ZOFRAN-ODT) 4 MG TbDL, Take 1 tablet (4 mg total) by mouth every 8 (eight) hours as needed (nausea / vomiting)., Disp: 20 tablet, Rfl: 0    triamcinolone acetonide 0.1% (KENALOG) 0.1 % ointment, Apply topically 3 (three) times daily., Disp: 453.6 g, Rfl: 3    zolpidem (AMBIEN CR) 12.5 MG CR tablet, Take 12.5 mg by mouth., Disp: , Rfl:       Review of Systems:  ROS:  The updated medical history is in the chart and has been reviewed. A review of systems is updated and there is no reported vision changes, ear/nose/mouth/throat complaints, chest pain, shortness of breath, abdominal pain, urological complaints, fevers or chills, psychiatric complaints. Musculoskeletal and neurologcial symptoms are as documented. All other systems are negative.      OBJECTIVE:     PHYSICAL EXAM:  There were no vitals taken for this visit.  General: Pleasant, cooperative, NAD.  HEENT: NCAT, sclera nonicteric.  Lungs: Respirations are equal and unlabored.   Abdomen: Soft and non-tender.  CV: 2+ bilateral upper and lower extremity pulses.  Neuro: Sensation intact to light touch.  Skin: Intact throughout LE with no rashes, erythema, or lesions.  Extremities: No LE edema, NVI lower extremities. normal gait.    left Knee Exam:  Knee Range of Motion:  0-120   Effusion:  Mild  Condition of skin: intact  Location of tenderness:  Lateral joint line   Strength: 5/5 quadriceps strength, 5/5 gastroc-soleus strength, 5/5 hamstring strength, and 5/5 tibialis anterior strength  Stability: stable to testing  Knee Alignment: normal     right Knee Exam:  Knee Range of Motion:  0-120   Effusion: none  Condition of skin: intact  Location of tenderness: Medial joint line   Strength: 5/5  quadriceps strength, 5/5 gastroc-soleus strength, 5/5 hamstring strength, and 5/5 tibialis anterior strength  Knee alignment: normal  Stability: stable to testing      RADIOGRAPHS:  X-rays of the bilateral knee taken on 01/31/2025 personally reviewed. Imaging reveals tricompartmental joint space narrowing of the bilateral knees worse in the lateral tibiofemoral joint space of the left knee and medial tibiofemoral joint space of the right knee with osteophyte formation.       ASSESSMENT:       ICD-10-CM ICD-9-CM   1. Bilateral primary osteoarthritis of knee  M17.0 715.16       PLAN:     Knee Injection Procedure Note  Diagnosis: bilateral knee degenerative arthritis  Indications: bilateral knee pain  Procedure Details: Verbal consent was obtained for the procedure. The injection site was identified and the skin was prepared with alcohol. The bilateral knee was injected from an anterolateral approach with 1 ml of Kenalog and 4 ml Lidocaine under sterile technique using a 22 gauge needle. The needle was removed and the area cleansed and dressed.  Complications:  Patient tolerated the procedure well.    she was advised to rest the knee today, using ice and elevation as needed for comfort and swelling.Immediate relief of the knee pain may be short lived and secondary to the lidocaine. she may have an increase in discomfort tonight followed by steady improvement over the next several days. It may take 1-2 weeks following the injection to get the full benefit of the medication.    - Patient requested a referral for outpatient physical therapy.  Outpatient PT referral sent to [facility] me she had location.    - Follow up in clinic as needed.    DISCLAIMER: This note was prepared with [employer] voice recognition transcription software. Garbled syntax, mangled pronouns, and other bizarre constructions may be attributed to that software system.    Anthony Biggs Jr., PA-C

## 2025-02-18 ENCOUNTER — CLINICAL SUPPORT (OUTPATIENT)
Dept: REHABILITATION | Facility: HOSPITAL | Age: 80
End: 2025-02-18
Payer: MEDICARE

## 2025-02-18 DIAGNOSIS — M25.561 CHRONIC PAIN OF BOTH KNEES: Primary | ICD-10-CM

## 2025-02-18 DIAGNOSIS — G89.29 CHRONIC PAIN OF BOTH KNEES: Primary | ICD-10-CM

## 2025-02-18 DIAGNOSIS — M25.562 CHRONIC PAIN OF BOTH KNEES: Primary | ICD-10-CM

## 2025-02-18 DIAGNOSIS — M17.0 BILATERAL PRIMARY OSTEOARTHRITIS OF KNEE: ICD-10-CM

## 2025-02-18 PROCEDURE — 97140 MANUAL THERAPY 1/> REGIONS: CPT | Mod: KX,PN

## 2025-02-18 PROCEDURE — 97110 THERAPEUTIC EXERCISES: CPT | Mod: KX,PN

## 2025-02-18 NOTE — PROGRESS NOTES
Outpatient Rehab    Physical Therapy Evaluation (only)    Patient Name: Brandi Manuel  MRN: [id number]  YOB: 1945  Today's Date: 2/18/2025    Therapy Diagnosis:   Encounter Diagnosis   Name Primary?    Bilateral primary osteoarthritis of knee      Physician: RICHIE Banks NP        Physician Orders: PT Eval and Treat   Medical Diagnosis from Referral: M17.0 (ICD-10-CM) - Bilateral primary osteoarthritis of knee   Evaluation Date: 2/18/2025  Insurance Authorization Period Expiration: 2/7/25 - 2/7/26  Plan of Care Certification:  2/18/2025 to 5/18/25   Progress Note Due: 3/20/2025  Visit # / Visits authorized: 1/ 1  Visits Remaining - 0  PTA visit #: 0/6    Time In: 0800  Time Out: 0900  Total Appointment Time : 60 minutes  Total Billable Time: 60     Intake Outcome Measure for FOTO Survey    Therapist reviewed FOTO scores for Brandi Manuel on 2/18/2025.   FOTO report - see Media section or FOTO account episode details.     Intake Score: 34%         Subjective   History of Present Illness  Brandi is a 79 y.o. female who reports to physical therapy with a chief concern of bilateral knee pain with L> R in regards to pain; Pt reports occasonal stumbling / fall. According to the patient's chart, Brandi has no past medical history on file. Brandi has a past surgical history that includes Tonsillectomy; Hysterectomy; Shoulder surgery; Breast biopsy; and Transforaminal epidural injection of steroid (N/A, 1/29/2025).    The patient reports a medical diagnosis of M17.0 (ICD-10-CM) - Bilateral primary osteoarthritis of knee. The patient has experienced this issue since 02/18/23.           History of Present Condition/Illness: Patient reports bilateral knee pain in which she reports frequent trip and stumbling without falls ; Patient reports pain began 3 years prior     Pain     Patient reports a current pain level of 5/10. Pain at best is reported as 10/10. Pain at worst is reported as 2/10.   Location: pain  in the knees that radiates down to the back  Clinical Progression (since onset): Worsening  Pain Qualities: Aching  Pain-Relieving Factors: Rest  Pain-Aggravating Factors: Exercise, Walking         Living Arrangements  Living Situation  Living Arrangements: Spouse/significant other        Employment  Employment Status: Retired          Past Medical History/Physical Systems Review:   Brandi Manuel  has no past medical history on file.    Brandi Manuel  has a past surgical history that includes Tonsillectomy; Hysterectomy; Shoulder surgery; Breast biopsy; and Transforaminal epidural injection of steroid (N/A, 1/29/2025).    Brandi has a current medication list which includes the following prescription(s): acetaminophen, amlodipine, bimatoprost, diclofenac sodium, gabapentin, meclizine, meloxicam, mirtazapine, ondansetron, triamcinolone acetonide 0.1%, and zolpidem.    Review of patient's allergies indicates:   Allergen Reactions    Codeine     Hydrocodone-acetaminophen Nausea Only        Objective       Hip Range of Motion   Right Hip   Active (deg) Passive (deg) Pain   Flexion 120       Extension         ABduction         ADduction         External Rotation 90/90         External Rotation Prone         Internal Rotation 90/90         Internal Rotation Prone             Left Hip   Active (deg) Passive (deg) Pain   Flexion 110       Extension         ABduction         ADduction         External Rotation 90/90         External Rotation Prone         Internal Rotation 90/90         Internal Rotation Prone                  Knee Range of Motion   Right Knee   Active (deg) Passive (deg) Pain   Flexion 120       Extension 0           Left Knee   Active (deg) Passive (deg) Pain   Flexion 125       Extension -5                          Hip Strength - Planes of Motion   Right Strength Right Pain Left Strength Left  Pain   Flexion (L2) 4-   4-     Extension 4-   4-     ABduction 4-   4-     ADduction 4-   4-     Internal  Rotation 4-   4-     External Rotation 4-   4-         Knee Strength   Right Strength Right Pain Left Strength Left  Pain   Flexion (S2) 3+   3+     Prone Flexion 3+   3+     Extension (L3) 3+   3+                   Four Stage Balance Test                 Single Leg Stand - Right Foot: 10 sec  Single Leg Stand - Left Foot: 7 sec       Sit to Stand Testing  The patient completed 5 sit to stand transfers in 15 sec.   The patient completed 11 repetitions of a sit to stand transfer in 30 seconds.            Gait Analysis  Base of Support: Narrow  Gait Pattern: Antalgic  Walking Speed: Decreased    Right Side Walking Observations  Decreased: Swing Time       Left Side Walking Observations  Decreased: Swing Time     Hip Observations During Gait  Bilateral: Decreased Hip Flexion  Knee Observations During Gait  Bilateral: Decreased Knee Flexion in Swing         Assessment & Plan   Assessment  Brandi presents with a condition of Low complexity.   Presentation of Symptoms: Stable       Functional Limitations: Activity tolerance  Impairments: Abnormal gait  Personal Factors Affecting Prognosis: Pain    Prognosis: Fair  Assessment Details: Patient displays lack of knee flexion and lack of hip flexion during ambulation period    Plan  From a physical therapy perspective, the patient would benefit from: Skilled Rehab Services    Planned therapy interventions include: Therapeutic exercise, Therapeutic activities, Neuromuscular re-education, and Manual therapy.                       This plan was discussed with Patient and Therapy assistant.              Patient's spiritual, cultural, and educational needs considered and patient agreeable to plan of care and goals.           Goals:   Active       LTG       Pt. to demonstrate increased MMT for Lumbar extensor paraspinals t >=  4/5 to improve tolerance for prolong standing and ambulation  (Progressing)       Start:  01/28/25    Expected End:  05/13/25            Pt. to demonstrate  increased MMT for Lumbar/thoracic rotators   >= 4/5 to improve tolerance for ADL and work activities.  (Progressing)       Start:  01/28/25    Expected End:  05/13/25            Pt. to demonstrate increased MMT for Lumbar/thoracic side bending   >= 4/5 to improve household cleaning. (Progressing)       Start:  01/28/25    Expected End:  05/13/25               LTG       Pt. to report decreased bilateral knee pain  </= 2/10 at worst to increase tolerance for prolong standing        Start:  02/18/25    Expected End:  05/13/25            Pt. to demonstrate increased MMT for bilateral quadriceps to 4/5 to increase ability to negotiate stairs        Start:  02/18/25    Expected End:  05/13/25               STG       Pt. to report decreased lumbar pain </ =  2/10 at worst to increase tolerance for upright unsupported sitting posture.   (Progressing)       Start:  01/28/25    Expected End:  05/13/25                  Increase lumbar flexion AROM >= 30 to 70 degrees to promote greater ease with self-care. (Progressing)       Start:  01/28/25    Expected End:  05/13/25            Increase AROM lumbar rotation bilateral  >= +10 degrees to promote greater ease with driving. (Progressing)       Start:  01/28/25    Expected End:  05/13/25              Resolved       Physical Therapy                 Physical Therapy Goal ( Error)       Start:  01/24/25    Resolved:  01/28/25             Zion Roman, PT

## 2025-02-20 ENCOUNTER — CLINICAL SUPPORT (OUTPATIENT)
Dept: REHABILITATION | Facility: HOSPITAL | Age: 80
End: 2025-02-20
Payer: MEDICARE

## 2025-02-20 DIAGNOSIS — G89.29 CHRONIC PAIN OF BOTH KNEES: Primary | ICD-10-CM

## 2025-02-20 DIAGNOSIS — M25.561 CHRONIC PAIN OF BOTH KNEES: Primary | ICD-10-CM

## 2025-02-20 DIAGNOSIS — M25.562 CHRONIC PAIN OF BOTH KNEES: Primary | ICD-10-CM

## 2025-02-20 PROCEDURE — 97110 THERAPEUTIC EXERCISES: CPT | Mod: KX,PN,CQ

## 2025-02-20 NOTE — PROGRESS NOTES
Outpatient Rehab    Physical Therapy Visit    Patient Name: Brandi Manuel  MRN: [id number]  YOB: 1945  Today's Date: 2/20/2025    Therapy Diagnosis:   Encounter Diagnosis   Name Primary?    Chronic pain of both knees Yes     Physician: RICHIE Banks, NP    Physician Orders: PT Eval and Treat   Medical Diagnosis from Referral: M17.0 (ICD-10-CM) - Bilateral primary osteoarthritis of knee   Evaluation Date: 2/18/2025  Insurance Authorization Period Expiration: 2/7/25 - 2/7/26  Plan of Care Certification:  2/18/2025 to 5/18/25   Progress Note Due: 3/20/2025  Visit # / Visits authorized: 8/ 20  Visits Remaining - 12  PTA visit #: 1/6  Time In: 8:00    Time Out: 9:00   Total Time: 60 min    Total Billable Time: 60 min             Subjective   Pain in L knee > R but new R tingling pain started last night.  Pain reported as 5/10.      Objective            Treatment:  Therapeutic Exercise  Therapeutic Exercise Activity 1: Quadset 3x10  Therapeutic Exercise Activity 2: Seated toe raises 3x10  Therapeutic Exercise Activity 3: Bike- 10'  Therapeutic Exercise Activity 4: Hamstring stretch 2x1' B  Therapeutic Exercise Activity 5: Edwin stretch B 2x1'    Assessment & Plan   Assessment: Gentle exercises performed for 1st F/U. Number of exercises limited due to continuous R Quad cramping. Supine hip flexor stretch added to reduce discomfort. PTA also educated pt on importance of drinking water to prevent cramping. Brandi will continue to benefit from skilled therapy.  Evaluation/Treatment Tolerance: Patient tolerated treatment well    Patient will continue to benefit from skilled outpatient physical therapy to address the deficits listed in the problem list box on initial evaluation, provide pt/family education and to maximize pt's level of independence in the home and community environment.     Patient's spiritual, cultural, and educational needs considered and patient agreeable to plan of care and goals.            Plan:      Goals:   Active       LTG       Pt. to demonstrate increased MMT for Lumbar extensor paraspinals t >=  4/5 to improve tolerance for prolong standing and ambulation  (Progressing)       Start:  01/28/25    Expected End:  05/13/25            Pt. to demonstrate increased MMT for Lumbar/thoracic rotators   >= 4/5 to improve tolerance for ADL and work activities.  (Progressing)       Start:  01/28/25    Expected End:  05/13/25            Pt. to demonstrate increased MMT for Lumbar/thoracic side bending   >= 4/5 to improve household cleaning. (Progressing)       Start:  01/28/25    Expected End:  05/13/25               LTG       Pt. to report decreased bilateral knee pain  </= 2/10 at worst to increase tolerance for prolong standing  (Progressing)       Start:  02/18/25    Expected End:  05/13/25            Pt. to demonstrate increased MMT for bilateral quadriceps to 4/5 to increase ability to negotiate stairs  (Progressing)       Start:  02/18/25    Expected End:  05/13/25               STG       Pt. to report decreased lumbar pain </ =  2/10 at worst to increase tolerance for upright unsupported sitting posture.   (Progressing)       Start:  01/28/25    Expected End:  05/13/25                  Increase lumbar flexion AROM >= 30 to 70 degrees to promote greater ease with self-care. (Progressing)       Start:  01/28/25    Expected End:  05/13/25            Increase AROM lumbar rotation bilateral  >= +10 degrees to promote greater ease with driving. (Progressing)       Start:  01/28/25    Expected End:  05/13/25              Resolved       Physical Therapy                 Physical Therapy Goal ( Error)       Start:  01/24/25    Resolved:  01/28/25             Hoang Baxter PTA

## 2025-02-25 ENCOUNTER — CLINICAL SUPPORT (OUTPATIENT)
Dept: REHABILITATION | Facility: HOSPITAL | Age: 80
End: 2025-02-25
Payer: MEDICARE

## 2025-02-25 DIAGNOSIS — M25.562 CHRONIC PAIN OF BOTH KNEES: Primary | ICD-10-CM

## 2025-02-25 DIAGNOSIS — M25.561 CHRONIC PAIN OF BOTH KNEES: Primary | ICD-10-CM

## 2025-02-25 DIAGNOSIS — G89.29 CHRONIC PAIN OF BOTH KNEES: Primary | ICD-10-CM

## 2025-02-25 PROCEDURE — 97110 THERAPEUTIC EXERCISES: CPT | Mod: KX,PN

## 2025-02-25 NOTE — PROGRESS NOTES
Outpatient Rehab    Physical Therapy Visit    Patient Name: Brandi Manuel  MRN: [id number]  YOB: 1945  Encounter Date: 2/25/2025    Therapy Diagnosis: No diagnosis found.  Physician: RICHIE Banks NP    Physician Orders: PT Eval and Treat   Medical Diagnosis from Referral: M17.0 (ICD-10-CM) - Bilateral primary osteoarthritis of knee   Evaluation Date: 2/18/2025  Insurance Authorization Period Expiration: 2/7/25 - 2/7/26  Plan of Care Certification:  2/18/2025 to 5/18/25   Progress Note Due: 3/20/2025  Visit # / Visits authorized: 9/ 20  Visits Remaining - 12  PTA visit #: 1/6  Time In: 8:00    Time Out: 9:00   Total Time: 60 min    Total Billable Time: 60 min    FOTO:  Intake Score:  %  Survey Score 1:  %  Survey Score 2:  %         Subjective   continued B knee pain with functional activity.  Pain reported as 7/10.      Objective            Treatment:  Therapeutic Exercise  Therapeutic Exercise Activity 1: GSS -3  Therapeutic Exercise Activity 2: long sitting HSS - 3'  Therapeutic Exercise Activity 4: latt stretch - 3'  Therapeutic Exercise Activity 5: LTR - 30 reps    Manual Therapy  Manual Therapy Activity 1: STM with theragun              Assessment & Plan   Assessment: Pt currently presents to therapy with continued bilateral knee pain. PT noted an increaes in medial hamstring tightness during today's treatment session that was decreased via theragun and stretching. Pt will cintinue to benefit from skilled therapy to address deficits.       Patient will continue to benefit from skilled outpatient physical therapy to address the deficits listed in the problem list box on initial evaluation, provide pt/family education and to maximize pt's level of independence in the home and community environment.     Patient's spiritual, cultural, and educational needs considered and patient agreeable to plan of care and goals.           Plan: continue with current POC    Goals:   Active       LTG       Pt.  to demonstrate increased MMT for Lumbar extensor paraspinals t >=  4/5 to improve tolerance for prolong standing and ambulation  (Progressing)       Start:  01/28/25    Expected End:  05/13/25            Pt. to demonstrate increased MMT for Lumbar/thoracic rotators   >= 4/5 to improve tolerance for ADL and work activities.  (Progressing)       Start:  01/28/25    Expected End:  05/13/25            Pt. to demonstrate increased MMT for Lumbar/thoracic side bending   >= 4/5 to improve household cleaning. (Progressing)       Start:  01/28/25    Expected End:  05/13/25               LTG       Pt. to report decreased bilateral knee pain  </= 2/10 at worst to increase tolerance for prolong standing  (Progressing)       Start:  02/18/25    Expected End:  05/13/25            Pt. to demonstrate increased MMT for bilateral quadriceps to 4/5 to increase ability to negotiate stairs  (Progressing)       Start:  02/18/25    Expected End:  05/13/25               STG       Pt. to report decreased lumbar pain </ =  2/10 at worst to increase tolerance for upright unsupported sitting posture.   (Progressing)       Start:  01/28/25    Expected End:  05/13/25                  Increase lumbar flexion AROM >= 30 to 70 degrees to promote greater ease with self-care. (Progressing)       Start:  01/28/25    Expected End:  05/13/25            Increase AROM lumbar rotation bilateral  >= +10 degrees to promote greater ease with driving. (Progressing)       Start:  01/28/25    Expected End:  05/13/25              Resolved       Physical Therapy                 Physical Therapy Goal ( Error)       Start:  01/24/25    Resolved:  01/28/25             Zion Roman, PT

## 2025-02-27 ENCOUNTER — CLINICAL SUPPORT (OUTPATIENT)
Dept: REHABILITATION | Facility: HOSPITAL | Age: 80
End: 2025-02-27
Payer: MEDICARE

## 2025-02-27 DIAGNOSIS — M25.562 CHRONIC PAIN OF BOTH KNEES: Primary | ICD-10-CM

## 2025-02-27 DIAGNOSIS — M25.561 CHRONIC PAIN OF BOTH KNEES: Primary | ICD-10-CM

## 2025-02-27 DIAGNOSIS — G89.29 CHRONIC PAIN OF BOTH KNEES: Primary | ICD-10-CM

## 2025-02-27 PROCEDURE — 97140 MANUAL THERAPY 1/> REGIONS: CPT | Mod: KX,PN,CQ

## 2025-02-27 PROCEDURE — 97110 THERAPEUTIC EXERCISES: CPT | Mod: KX,PN,CQ

## 2025-02-27 NOTE — PROGRESS NOTES
Outpatient Rehab    Physical Therapy Visit    Patient Name: Brandi Manuel  MRN: [id number]  YOB: 1945  Encounter Date: 2/27/2025    Therapy Diagnosis:   Encounter Diagnosis   Name Primary?    Chronic pain of both knees Yes     Physician: RICHIE Banks NP    Physician Orders: PT Eval and Treat   Medical Diagnosis from Referral: M17.0 (ICD-10-CM) - Bilateral primary osteoarthritis of knee   Evaluation Date: 2/18/2025  Insurance Authorization Period Expiration: 2/7/25 - 2/7/26  Plan of Care Certification:  2/18/2025 to 5/18/25   Progress Note Due: 3/20/2025  Visit # / Visits authorized: 9/ 20  Visits Remaining - 12  PTA visit #: 1/6     Time In: 8:00    Time Out: 9:00   Total Time: 60 min    Total Billable Time: 60 min           Subjective   L knee sometimes feels weak.  Pain reported as 6/10.      Objective            Treatment:  Therapeutic Exercise  Therapeutic Exercise Activity 1: GSS -3  Therapeutic Exercise Activity 2: long sitting HSS - 2x1'  Therapeutic Exercise Activity 3: Bike- 10'  Therapeutic Exercise Activity 4: Precor Knee ext 3x10 #15;  SL #10  Therapeutic Exercise Activity 5: Precor hamstring curl 3x10 #25    Manual Therapy  Manual Therapy Activity 1: STM with theragun - 15'    Assessment & Plan   Assessment: Precor exercises added to strengthen Quad and hamstring to prevent knee buckling w/ ambulation. Single leg more challenging L > R. STM w/ massage gun tolerated well. Pt reported decreased pain following session and will continue to benefit from skilled therapy.  Evaluation/Treatment Tolerance: Patient tolerated treatment well    Patient will continue to benefit from skilled outpatient physical therapy to address the deficits listed in the problem list box on initial evaluation, provide pt/family education and to maximize pt's level of independence in the home and community environment.     Patient's spiritual, cultural, and educational needs considered and patient agreeable to  plan of care and goals.           Plan:      Goals:   Active       LTG       Pt. to demonstrate increased MMT for Lumbar extensor paraspinals t >=  4/5 to improve tolerance for prolong standing and ambulation  (Progressing)       Start:  01/28/25    Expected End:  05/13/25            Pt. to demonstrate increased MMT for Lumbar/thoracic rotators   >= 4/5 to improve tolerance for ADL and work activities.  (Progressing)       Start:  01/28/25    Expected End:  05/13/25            Pt. to demonstrate increased MMT for Lumbar/thoracic side bending   >= 4/5 to improve household cleaning. (Progressing)       Start:  01/28/25    Expected End:  05/13/25               LTG       Pt. to report decreased bilateral knee pain  </= 2/10 at worst to increase tolerance for prolong standing  (Progressing)       Start:  02/18/25    Expected End:  05/13/25            Pt. to demonstrate increased MMT for bilateral quadriceps to 4/5 to increase ability to negotiate stairs  (Progressing)       Start:  02/18/25    Expected End:  05/13/25               STG       Pt. to report decreased lumbar pain </ =  2/10 at worst to increase tolerance for upright unsupported sitting posture.   (Progressing)       Start:  01/28/25    Expected End:  05/13/25                  Increase lumbar flexion AROM >= 30 to 70 degrees to promote greater ease with self-care. (Progressing)       Start:  01/28/25    Expected End:  05/13/25            Increase AROM lumbar rotation bilateral  >= +10 degrees to promote greater ease with driving. (Progressing)       Start:  01/28/25    Expected End:  05/13/25              Resolved       Physical Therapy                 Physical Therapy Goal ( Error)       Start:  01/24/25    Resolved:  01/28/25             Hoang Baxter PTA

## 2025-03-03 ENCOUNTER — CLINICAL SUPPORT (OUTPATIENT)
Dept: REHABILITATION | Facility: HOSPITAL | Age: 80
End: 2025-03-03
Payer: MEDICARE

## 2025-03-03 DIAGNOSIS — M25.562 CHRONIC PAIN OF BOTH KNEES: Primary | ICD-10-CM

## 2025-03-03 DIAGNOSIS — M25.561 CHRONIC PAIN OF BOTH KNEES: Primary | ICD-10-CM

## 2025-03-03 DIAGNOSIS — G89.29 CHRONIC PAIN OF BOTH KNEES: Primary | ICD-10-CM

## 2025-03-03 PROCEDURE — 97110 THERAPEUTIC EXERCISES: CPT | Mod: KX,PN,CQ

## 2025-03-03 NOTE — PROGRESS NOTES
Outpatient Rehab    Physical Therapy Visit    Patient Name: Brandi Manuel  MRN: [id number]  YOB: 1945  Encounter Date: 3/3/2025    Therapy Diagnosis:   Encounter Diagnosis   Name Primary?    Chronic pain of both knees Yes     Physician: RICHIE Banks NP    Physician Orders: PT Eval and Treat   Medical Diagnosis from Referral: M17.0 (ICD-10-CM) - Bilateral primary osteoarthritis of knee   Evaluation Date: 2/18/2025  Insurance Authorization Period Expiration: 2/7/25 - 2/7/26  Plan of Care Certification:  2/18/2025 to 5/18/25   Progress Note Due: 3/20/2025  Visit # / Visits authorized: 11/ 20  Visits Remaining - 9  PTA visit #: 2/6     Time In: 0800    Time Out: 0855   Total Time: 55    Total Billable Time: 30    FOTO:  Intake Score:  %  Survey Score 1:  %  Survey Score 2:  %         Subjective   Patient reports having a little less pain today, maybe 4 or 5..  Pain reported as 4/10.      Objective            Treatment:  Therapeutic Exercise  Therapeutic Exercise Activity 1: GSS -3  Therapeutic Exercise Activity 2: long sitting HSS - 2x1'  Therapeutic Exercise Activity 3: Bike- 10'  Therapeutic Exercise Activity 4: Precor Knee ext 3x10 #15;  SL #10  Therapeutic Exercise Activity 5: Precor hamstring curl 3x10 #25  Therapeutic Exercise Activity 6: Swiss Ball Roll Outs 3/10  Therapeutic Exercise Activity 7: Standing TKE w/BTB 2x10 B  Therapeutic Exercise Activity 9: BTB Rows w/TA bracing 3/10  Therapeutic Exercise Activity 10: BTB Sh ext w/TA bracing 3x10    Assessment & Plan   Assessment: Patient completed her therapy as noted above and had no difficulty with addition of TKE with theraband with no increase in symptoms prior to leaving the clinic.       Patient will continue to benefit from skilled outpatient physical therapy to address the deficits listed in the problem list box on initial evaluation, provide pt/family education and to maximize pt's level of independence in the home and community  environment.     Patient's spiritual, cultural, and educational needs considered and patient agreeable to plan of care and goals.           Plan:      Goals:   Active       LTG       Pt. to demonstrate increased MMT for Lumbar extensor paraspinals t >=  4/5 to improve tolerance for prolong standing and ambulation  (Progressing)       Start:  01/28/25    Expected End:  05/13/25            Pt. to demonstrate increased MMT for Lumbar/thoracic rotators   >= 4/5 to improve tolerance for ADL and work activities.  (Progressing)       Start:  01/28/25    Expected End:  05/13/25            Pt. to demonstrate increased MMT for Lumbar/thoracic side bending   >= 4/5 to improve household cleaning. (Progressing)       Start:  01/28/25    Expected End:  05/13/25               LTG       Pt. to report decreased bilateral knee pain  </= 2/10 at worst to increase tolerance for prolong standing  (Progressing)       Start:  02/18/25    Expected End:  05/13/25            Pt. to demonstrate increased MMT for bilateral quadriceps to 4/5 to increase ability to negotiate stairs  (Progressing)       Start:  02/18/25    Expected End:  05/13/25               STG       Pt. to report decreased lumbar pain </ =  2/10 at worst to increase tolerance for upright unsupported sitting posture.   (Progressing)       Start:  01/28/25    Expected End:  05/13/25                  Increase lumbar flexion AROM >= 30 to 70 degrees to promote greater ease with self-care. (Progressing)       Start:  01/28/25    Expected End:  05/13/25            Increase AROM lumbar rotation bilateral  >= +10 degrees to promote greater ease with driving. (Progressing)       Start:  01/28/25    Expected End:  05/13/25              Resolved       Physical Therapy                 Physical Therapy Goal ( Error)       Start:  01/24/25    Resolved:  01/28/25             Sherif Reveles, PTA

## 2025-03-06 ENCOUNTER — CLINICAL SUPPORT (OUTPATIENT)
Dept: REHABILITATION | Facility: HOSPITAL | Age: 80
End: 2025-03-06
Payer: MEDICARE

## 2025-03-06 DIAGNOSIS — M25.562 CHRONIC PAIN OF BOTH KNEES: Primary | ICD-10-CM

## 2025-03-06 DIAGNOSIS — M25.561 CHRONIC PAIN OF BOTH KNEES: Primary | ICD-10-CM

## 2025-03-06 DIAGNOSIS — G89.29 CHRONIC PAIN OF BOTH KNEES: Primary | ICD-10-CM

## 2025-03-06 PROCEDURE — 97110 THERAPEUTIC EXERCISES: CPT | Mod: KX,PN,CQ

## 2025-03-06 NOTE — PROGRESS NOTES
Outpatient Rehab    Physical Therapy Visit    Patient Name: Brandi Manuel  MRN: [id number]  YOB: 1945  Encounter Date: 3/6/2025    Therapy Diagnosis:   Encounter Diagnosis   Name Primary?    Chronic pain of both knees Yes     Physician: RICHIE Banks NP    Physician Orders: PT Eval and Treat   Medical Diagnosis from Referral: M17.0 (ICD-10-CM) - Bilateral primary osteoarthritis of knee   Evaluation Date: 2/18/2025  Insurance Authorization Period Expiration: 2/7/25 - 2/7/26  Plan of Care Certification:  2/18/2025 to 5/18/25   Progress Note Due: 3/20/2025  Visit # / Visits authorized: 12/ 20  Visits Remaining - 8  PTA visit #: 3/6     Time In: 8:00    Time Out: 9:00   Total Time: 60 min    Total Billable Time: 60 min           Subjective   Knees and LE painful last night, pt took pain meds and feels better today.  Pain reported as 4/10.      Objective            Treatment:  Therapeutic Exercise  Therapeutic Exercise Activity 1: GSS -3  Therapeutic Exercise Activity 2: long sitting HSS - 2x1'  Therapeutic Exercise Activity 3: Bike- 10'  Therapeutic Exercise Activity 4: Precor Knee ext 3x10 #15;  SL #10  Therapeutic Exercise Activity 5: Precor hamstring curl 3x10 #25  Therapeutic Exercise Activity 6: Prone quad stretch 3x1' B    Assessment & Plan   Assessment: Session focused on gentle stretch and strengthening to reduce pain at anterior aspect of knees. Pt reports B numbness below the knees of equal intensity. PTA advised pt to communicate this w/ doctor to rule out changes in back. She will continue to benefit from skilled therapy.  Evaluation/Treatment Tolerance: Patient tolerated treatment well    Patient will continue to benefit from skilled outpatient physical therapy to address the deficits listed in the problem list box on initial evaluation, provide pt/family education and to maximize pt's level of independence in the home and community environment.     Patient's spiritual, cultural, and  educational needs considered and patient agreeable to plan of care and goals.           Plan:      Goals:   Active       LTG       Pt. to demonstrate increased MMT for Lumbar extensor paraspinals t >=  4/5 to improve tolerance for prolong standing and ambulation  (Progressing)       Start:  01/28/25    Expected End:  05/13/25            Pt. to demonstrate increased MMT for Lumbar/thoracic rotators   >= 4/5 to improve tolerance for ADL and work activities.  (Progressing)       Start:  01/28/25    Expected End:  05/13/25            Pt. to demonstrate increased MMT for Lumbar/thoracic side bending   >= 4/5 to improve household cleaning. (Progressing)       Start:  01/28/25    Expected End:  05/13/25               LTG       Pt. to report decreased bilateral knee pain  </= 2/10 at worst to increase tolerance for prolong standing  (Progressing)       Start:  02/18/25    Expected End:  05/13/25            Pt. to demonstrate increased MMT for bilateral quadriceps to 4/5 to increase ability to negotiate stairs  (Progressing)       Start:  02/18/25    Expected End:  05/13/25               STG       Pt. to report decreased lumbar pain </ =  2/10 at worst to increase tolerance for upright unsupported sitting posture.   (Progressing)       Start:  01/28/25    Expected End:  05/13/25                  Increase lumbar flexion AROM >= 30 to 70 degrees to promote greater ease with self-care. (Progressing)       Start:  01/28/25    Expected End:  05/13/25            Increase AROM lumbar rotation bilateral  >= +10 degrees to promote greater ease with driving. (Progressing)       Start:  01/28/25    Expected End:  05/13/25              Resolved       Physical Therapy                 Physical Therapy Goal ( Error)       Start:  01/24/25    Resolved:  01/28/25             Hoang Baxter PTA

## 2025-03-11 ENCOUNTER — CLINICAL SUPPORT (OUTPATIENT)
Dept: REHABILITATION | Facility: HOSPITAL | Age: 80
End: 2025-03-11
Payer: MEDICARE

## 2025-03-11 DIAGNOSIS — G89.29 CHRONIC PAIN OF BOTH KNEES: Primary | ICD-10-CM

## 2025-03-11 DIAGNOSIS — M25.561 CHRONIC PAIN OF BOTH KNEES: Primary | ICD-10-CM

## 2025-03-11 DIAGNOSIS — M25.562 CHRONIC PAIN OF BOTH KNEES: Primary | ICD-10-CM

## 2025-03-11 PROCEDURE — 97110 THERAPEUTIC EXERCISES: CPT | Mod: KX,PN,CQ

## 2025-03-11 NOTE — PROGRESS NOTES
Outpatient Rehab    Physical Therapy Visit    Patient Name: Brandi Manuel  MRN: [id number]  YOB: 1945  Encounter Date: 3/11/2025    Therapy Diagnosis:   Encounter Diagnosis   Name Primary?    Chronic pain of both knees Yes     Physician: RICHIE Banks, NP    Physician Orders: PT Eval and Treat   Medical Diagnosis from Referral: M17.0 (ICD-10-CM) - Bilateral primary osteoarthritis of knee   Evaluation Date: 2/18/2025  Insurance Authorization Period Expiration: 2/7/25 - 2/7/26  Plan of Care Certification:  2/18/2025 to 5/18/25   Progress Note Due: 3/20/2025  Visit # / Visits authorized: 13/ 20  Visits Remaining - 7  PTA visit #: 4/6     Time In: 7:00    Time Out:8:00    Total Time: 60 min    Total Billable Time: 30 min             Subjective   Knees are telling me that they still hurt; B numbness still present.  Pain reported as 4/10.      Objective            Treatment:  Therapeutic Exercise  Therapeutic Exercise Activity 1: GSS -3  Therapeutic Exercise Activity 2: long sitting HSS - 2x1' B  Therapeutic Exercise Activity 3: Bike- 10'  Therapeutic Exercise Activity 7: Quad set 3x10 B  Therapeutic Exercise Activity 8: SLR 3x10  B #2    Assessment & Plan   Assessment: No adverse effects w/ exercises today. STM to hamstring w/ theragun more tolerable today. Pt contacting doctor today redgarding bilateral LE numbness Brandi will continue to benefit from skilled therapy.  Evaluation/Treatment Tolerance: Patient tolerated treatment well    Patient will continue to benefit from skilled outpatient physical therapy to address the deficits listed in the problem list box on initial evaluation, provide pt/family education and to maximize pt's level of independence in the home and community environment.     Patient's spiritual, cultural, and educational needs considered and patient agreeable to plan of care and goals.           Plan:      Goals:   Active       LTG       Pt. to demonstrate increased MMT for Lumbar  extensor paraspinals t >=  4/5 to improve tolerance for prolong standing and ambulation  (Progressing)       Start:  01/28/25    Expected End:  05/13/25            Pt. to demonstrate increased MMT for Lumbar/thoracic rotators   >= 4/5 to improve tolerance for ADL and work activities.  (Progressing)       Start:  01/28/25    Expected End:  05/13/25            Pt. to demonstrate increased MMT for Lumbar/thoracic side bending   >= 4/5 to improve household cleaning. (Progressing)       Start:  01/28/25    Expected End:  05/13/25               LTG       Pt. to report decreased bilateral knee pain  </= 2/10 at worst to increase tolerance for prolong standing  (Progressing)       Start:  02/18/25    Expected End:  05/13/25            Pt. to demonstrate increased MMT for bilateral quadriceps to 4/5 to increase ability to negotiate stairs  (Progressing)       Start:  02/18/25    Expected End:  05/13/25               STG       Pt. to report decreased lumbar pain </ =  2/10 at worst to increase tolerance for upright unsupported sitting posture.   (Progressing)       Start:  01/28/25    Expected End:  05/13/25                  Increase lumbar flexion AROM >= 30 to 70 degrees to promote greater ease with self-care. (Progressing)       Start:  01/28/25    Expected End:  05/13/25            Increase AROM lumbar rotation bilateral  >= +10 degrees to promote greater ease with driving. (Progressing)       Start:  01/28/25    Expected End:  05/13/25              Resolved       Physical Therapy                 Physical Therapy Goal ( Error)       Start:  01/24/25    Resolved:  01/28/25             Hoang Baxter PTA

## 2025-03-13 ENCOUNTER — CLINICAL SUPPORT (OUTPATIENT)
Dept: REHABILITATION | Facility: HOSPITAL | Age: 80
End: 2025-03-13
Payer: MEDICARE

## 2025-03-13 DIAGNOSIS — M25.561 CHRONIC PAIN OF BOTH KNEES: Primary | ICD-10-CM

## 2025-03-13 DIAGNOSIS — M25.562 CHRONIC PAIN OF BOTH KNEES: Primary | ICD-10-CM

## 2025-03-13 DIAGNOSIS — G89.29 CHRONIC PAIN OF BOTH KNEES: Primary | ICD-10-CM

## 2025-03-13 PROCEDURE — 97110 THERAPEUTIC EXERCISES: CPT | Mod: PN,CQ

## 2025-03-13 NOTE — PROGRESS NOTES
"  Outpatient Rehab    Physical Therapy Visit    Patient Name: Brandi Manuel  MRN: [id number]  YOB: 1945  Encounter Date: 3/13/2025    Therapy Diagnosis:   Encounter Diagnosis   Name Primary?    Chronic pain of both knees Yes     Physician: RICHIE Banks NP    Physician Orders: PT Eval and Treat   Medical Diagnosis from Referral: M17.0 (ICD-10-CM) - Bilateral primary osteoarthritis of knee   Evaluation Date: 2/18/2025  Insurance Authorization Period Expiration: 2/7/25 - 2/7/26  Plan of Care Certification:  2/18/2025 to 5/18/25   Progress Note Due: 3/20/2025  Visit # / Visits authorized: 14/ 20  Visits Remaining - 6  PTA visit #: 5/6     Time In: 7:02    Time Out: 8:00   Total Time:  58 mins   Total Billable Time: 30 mins         Subjective   She still "knows that her knees are there".  Pain reported as 4/10.      Objective            Treatment:  Therapeutic Exercise  Therapeutic Exercise Activity 1: GSS -3  Therapeutic Exercise Activity 2: long sitting HSS - 2x1' B  Therapeutic Exercise Activity 3: Bike- 10'  Therapeutic Exercise Activity 5: Precor hamstring curl 3x10 #25  Therapeutic Exercise Activity 7: Quad set 3x10 B  Therapeutic Exercise Activity 8: SLR 3x10  B #2  Therapeutic Exercise Activity 9: Heel Raises 3x10    Manual Therapy  Manual Therapy Activity 2: Tibio-femoral distraction 5#, 5'    Assessment & Plan   Assessment: Pt contacted doctor and left a message about bilateral LE numbness. Hamstring curls and heel raises added to increase strength in the LE to help the patient feel more confident in her yard work and getting up and down. Felt a difference with Tibio-femoral distraction to open the joint space and decrease discomfort in the knees.  Evaluation/Treatment Tolerance: Patient tolerated treatment well    Patient will continue to benefit from skilled outpatient physical therapy to address the deficits listed in the problem list box on initial evaluation, provide pt/family education " and to maximize pt's level of independence in the home and community environment.     Patient's spiritual, cultural, and educational needs considered and patient agreeable to plan of care and goals.           Plan:      Goals:   Active       LTG       Pt. to demonstrate increased MMT for Lumbar extensor paraspinals t >=  4/5 to improve tolerance for prolong standing and ambulation  (Progressing)       Start:  01/28/25    Expected End:  05/13/25            Pt. to demonstrate increased MMT for Lumbar/thoracic rotators   >= 4/5 to improve tolerance for ADL and work activities.  (Progressing)       Start:  01/28/25    Expected End:  05/13/25            Pt. to demonstrate increased MMT for Lumbar/thoracic side bending   >= 4/5 to improve household cleaning. (Progressing)       Start:  01/28/25    Expected End:  05/13/25               LTG       Pt. to report decreased bilateral knee pain  </= 2/10 at worst to increase tolerance for prolong standing  (Progressing)       Start:  02/18/25    Expected End:  05/13/25            Pt. to demonstrate increased MMT for bilateral quadriceps to 4/5 to increase ability to negotiate stairs  (Progressing)       Start:  02/18/25    Expected End:  05/13/25               STG       Pt. to report decreased lumbar pain </ =  2/10 at worst to increase tolerance for upright unsupported sitting posture.   (Progressing)       Start:  01/28/25    Expected End:  05/13/25                  Increase lumbar flexion AROM >= 30 to 70 degrees to promote greater ease with self-care. (Progressing)       Start:  01/28/25    Expected End:  05/13/25            Increase AROM lumbar rotation bilateral  >= +10 degrees to promote greater ease with driving. (Progressing)       Start:  01/28/25    Expected End:  05/13/25              Resolved       Physical Therapy                 Physical Therapy Goal ( Error)       Start:  01/24/25    Resolved:  01/28/25             Hoang Baxter PTA

## 2025-03-17 ENCOUNTER — PATIENT MESSAGE (OUTPATIENT)
Dept: ADMINISTRATIVE | Facility: HOSPITAL | Age: 80
End: 2025-03-17
Payer: MEDICARE

## 2025-03-18 ENCOUNTER — CLINICAL SUPPORT (OUTPATIENT)
Dept: REHABILITATION | Facility: HOSPITAL | Age: 80
End: 2025-03-18
Payer: MEDICARE

## 2025-03-18 DIAGNOSIS — G89.29 CHRONIC PAIN OF BOTH KNEES: Primary | ICD-10-CM

## 2025-03-18 DIAGNOSIS — M25.561 CHRONIC PAIN OF BOTH KNEES: Primary | ICD-10-CM

## 2025-03-18 DIAGNOSIS — M25.562 CHRONIC PAIN OF BOTH KNEES: Primary | ICD-10-CM

## 2025-03-18 PROCEDURE — 97110 THERAPEUTIC EXERCISES: CPT | Mod: PN

## 2025-03-18 PROCEDURE — 97530 THERAPEUTIC ACTIVITIES: CPT | Mod: PN

## 2025-03-18 NOTE — PROGRESS NOTES
Outpatient Rehab    Physical Therapy Progress Note    Patient Name: Brandi Manuel  MRN: [id number]  YOB: 1945  Encounter Date: 3/18/2025    Therapy Diagnosis:   Encounter Diagnosis   Name Primary?    Chronic pain of both knees Yes     Physician: RICHIE Banks NP    Physician Orders: PT Eval and Treat   Medical Diagnosis from Referral: M17.0 (ICD-10-CM) - Bilateral primary osteoarthritis of knee   Evaluation Date: 2/18/2025  Insurance Authorization Period Expiration: 2/7/25 - 2/7/26  Plan of Care Certification:  2/18/2025 to 5/18/25   Progress Note Due: 3/20/2025  Visit # / Visits authorized: 15/ 20  Visits Remaining - 6  PTA visit #: 0/6                Time In: 0800  Time Out: 9:00   Total Time:  58 mins   Total Billable Time: 30 mins    FOTO:  Intake Score:  %  Survey Score 1:  %  Survey Score 2:  %         Subjective   knee feels better today than yesterday; sometime my foot drags.  Pain reported as 4/10.      Objective      Lumbar Range of Motion   Active (deg) Passive (deg) Pain   Flexion 100       Extension 45       Right Lateral Flexion 45       Right Rotation 55       Left Lateral Flexion 40       Left Rotation 55                Hip Range of Motion   Right Hip   Active (deg) Passive (deg) Pain   Flexion 120       Extension         ABduction         ADduction         External Rotation 90/90         External Rotation Prone         Internal Rotation 90/90         Internal Rotation Prone             Left Hip   Active (deg) Passive (deg) Pain   Flexion 110       Extension         ABduction         ADduction         External Rotation 90/90         External Rotation Prone         Internal Rotation 90/90         Internal Rotation Prone                  Knee Range of Motion   Right Knee   Active (deg) Passive (deg) Pain   Flexion 120       Extension 0           Left Knee   Active (deg) Passive (deg) Pain   Flexion 120       Extension -5                          Hip Strength - Planes of Motion    "Right Strength Right Pain Left Strength Left  Pain   Flexion (L2) 4   4     Extension 4   4     ABduction 4   4     ADduction 4   4     Internal Rotation 4   4     External Rotation 4   4         Knee Strength   Right Strength Right Pain Left Strength Left  Pain   Flexion (S2) 4-   4-     Prone Flexion           Extension (L3) 4-   4-                  Treatment:  Therapeutic Exercise  TE 1: GSS -3  TE 3: Bike- 10'  TE 6: anterior tib lifts - #3 30 reps  TE 9: Heel Raises 3x10  TE 10: toe lifts (anterior tib) off of 2" block 2x3'  Balance/Neuromuscular Re-Education  NMR 1: heel walking - 3'  NMR 2: toe walking - 3    Time Entry(in minutes):  Therapeutic Activity Time Entry: 15  Therapeutic Exercise Time Entry: 45    Assessment & Plan   Assessment: Pt currently presents to therapy with reports of fear of falling secondary to toe drag. PT focued on improved gastroc and anterior tib strength to improve ambulation tolerance and reduce toe drag  Evaluation/Treatment Tolerance: Patient tolerated treatment well    Patient will continue to benefit from skilled outpatient physical therapy to address the deficits listed in the problem list box on initial evaluation, provide pt/family education and to maximize pt's level of independence in the home and community environment.     Patient's spiritual, cultural, and educational needs considered and patient agreeable to plan of care and goals.           Plan: continue with current POC    Goals:   Active       LTG       Pt. to demonstrate increased MMT for Lumbar extensor paraspinals t >=  4/5 to improve tolerance for prolong standing and ambulation  (Progressing)       Start:  01/28/25    Expected End:  05/13/25            Pt. to demonstrate increased MMT for Lumbar/thoracic rotators   >= 4/5 to improve tolerance for ADL and work activities.  (Progressing)       Start:  01/28/25    Expected End:  05/13/25            Pt. to demonstrate increased MMT for Lumbar/thoracic side bending  "  >= 4/5 to improve household cleaning. (Progressing)       Start:  01/28/25    Expected End:  05/13/25               LTG       Pt. to report decreased bilateral knee pain  </= 2/10 at worst to increase tolerance for prolong standing  (Progressing)       Start:  02/18/25    Expected End:  05/13/25            Pt. to demonstrate increased MMT for bilateral quadriceps to 4/5 to increase ability to negotiate stairs  (Progressing)       Start:  02/18/25    Expected End:  05/13/25               STG       Pt. to report decreased lumbar pain </ =  2/10 at worst to increase tolerance for upright unsupported sitting posture.   (Progressing)       Start:  01/28/25    Expected End:  05/13/25                  Increase lumbar flexion AROM >= 30 to 70 degrees to promote greater ease with self-care. (Progressing)       Start:  01/28/25    Expected End:  05/13/25            Increase AROM lumbar rotation bilateral  >= +10 degrees to promote greater ease with driving. (Progressing)       Start:  01/28/25    Expected End:  05/13/25              Resolved       Physical Therapy                 Physical Therapy Goal ( Error)       Start:  01/24/25    Resolved:  01/28/25             Zion Roman, PT

## 2025-03-20 ENCOUNTER — CLINICAL SUPPORT (OUTPATIENT)
Dept: REHABILITATION | Facility: HOSPITAL | Age: 80
End: 2025-03-20
Payer: MEDICARE

## 2025-03-20 DIAGNOSIS — M25.562 CHRONIC PAIN OF BOTH KNEES: Primary | ICD-10-CM

## 2025-03-20 DIAGNOSIS — M25.561 CHRONIC PAIN OF BOTH KNEES: Primary | ICD-10-CM

## 2025-03-20 DIAGNOSIS — R29.898 WEAKNESS OF BOTH LOWER EXTREMITIES: ICD-10-CM

## 2025-03-20 DIAGNOSIS — G89.29 CHRONIC PAIN OF BOTH KNEES: Primary | ICD-10-CM

## 2025-03-20 PROCEDURE — 97110 THERAPEUTIC EXERCISES: CPT | Mod: PN,CQ

## 2025-03-20 NOTE — PROGRESS NOTES
"  Outpatient Rehab    Physical Therapy Visit    Patient Name: Brandi Manuel  MRN: [id number]  YOB: 1945  Encounter Date: 3/20/2025    Therapy Diagnosis:   Encounter Diagnoses   Name Primary?    Chronic pain of both knees Yes    Weakness of both lower extremities      Physician: RICHIE Banks NP    Physician Orders: PT Eval and Treat   Medical Diagnosis from Referral: M17.0 (ICD-10-CM) - Bilateral primary osteoarthritis of knee   Evaluation Date: 2/18/2025  Insurance Authorization Period Expiration: 2/7/25 - 2/7/26  Plan of Care Certification:  2/18/2025 to 5/18/25   Progress Note Due: 3/20/2025     PT/PTA:     Number of PTA visits since last PT visit: 1/6  Time In:  7:00   Time Out: 8:00   Total Time:  60 mins   Total Billable Time: 60 mins         Subjective   has a knee brace on after knee buckling when walking..         Objective            Treatment:  Therapeutic Exercise  TE 1: GSS -3  TE 3: Bike- 10'  TE 4: heel walking -2.5'  TE 5: toe walking- 2.5'  TE 6: anterior tib lifts - #3 30 reps  TE 7: Squats - 3x10  TE 8: LAQ's 2#- 3x10  TE 9: Heel Raises 3x10  TE 10: toe lifts (anterior tib) off of 2" block 2x3'  Manual Therapy  MT 2: Tibio-femoral distraction 5#, 5'      Time Entry(in minutes):  Therapeutic Exercise Time Entry: 60    Assessment & Plan   Assessment: Pt came into therapy with a knee brace secondary to knee buckling and fear of falling. Added LAQ's and squats to target quad strength and help with stablity. Continued working on anterior tib and gastroc through toe/heel lifts and walking to address toe drag. Pt tolerated new exercise well and will continue to benefit from LE strengthening. Pt would also benefit from precor knee ext.       Patient will continue to benefit from skilled outpatient physical therapy to address the deficits listed in the problem list box on initial evaluation, provide pt/family education and to maximize pt's level of independence in the home and community " environment.     Patient's spiritual, cultural, and educational needs considered and patient agreeable to plan of care and goals.           Plan:      Goals:   Active       LTG       Pt. to demonstrate increased MMT for Lumbar extensor paraspinals t >=  4/5 to improve tolerance for prolong standing and ambulation  (Progressing)       Start:  01/28/25    Expected End:  05/13/25            Pt. to demonstrate increased MMT for Lumbar/thoracic rotators   >= 4/5 to improve tolerance for ADL and work activities.  (Progressing)       Start:  01/28/25    Expected End:  05/13/25            Pt. to demonstrate increased MMT for Lumbar/thoracic side bending   >= 4/5 to improve household cleaning. (Progressing)       Start:  01/28/25    Expected End:  05/13/25               LTG       Pt. to report decreased bilateral knee pain  </= 2/10 at worst to increase tolerance for prolong standing  (Progressing)       Start:  02/18/25    Expected End:  05/13/25            Pt. to demonstrate increased MMT for bilateral quadriceps to 4/5 to increase ability to negotiate stairs  (Progressing)       Start:  02/18/25    Expected End:  05/13/25               STG       Pt. to report decreased lumbar pain </ =  2/10 at worst to increase tolerance for upright unsupported sitting posture.   (Progressing)       Start:  01/28/25    Expected End:  05/13/25                  Increase lumbar flexion AROM >= 30 to 70 degrees to promote greater ease with self-care. (Progressing)       Start:  01/28/25    Expected End:  05/13/25            Increase AROM lumbar rotation bilateral  >= +10 degrees to promote greater ease with driving. (Progressing)       Start:  01/28/25    Expected End:  05/13/25              Resolved       Physical Therapy                 Physical Therapy Goal ( Error)       Start:  01/24/25    Resolved:  01/28/25             oHang Baxter, KATHRYN

## 2025-03-24 DIAGNOSIS — Z00.00 ENCOUNTER FOR MEDICARE ANNUAL WELLNESS EXAM: ICD-10-CM

## 2025-03-25 ENCOUNTER — CLINICAL SUPPORT (OUTPATIENT)
Dept: REHABILITATION | Facility: HOSPITAL | Age: 80
End: 2025-03-25
Payer: MEDICARE

## 2025-03-25 DIAGNOSIS — M25.562 CHRONIC PAIN OF BOTH KNEES: Primary | ICD-10-CM

## 2025-03-25 DIAGNOSIS — M25.561 CHRONIC PAIN OF BOTH KNEES: Primary | ICD-10-CM

## 2025-03-25 DIAGNOSIS — G89.29 CHRONIC PAIN OF BOTH KNEES: Primary | ICD-10-CM

## 2025-03-25 PROCEDURE — 97110 THERAPEUTIC EXERCISES: CPT | Mod: PN

## 2025-03-25 NOTE — PROGRESS NOTES
Outpatient Rehab    Physical Therapy Visit    Patient Name: Brandi Manuel  MRN: [id number]  YOB: 1945  Encounter Date: 3/25/2025    Therapy Diagnosis: No diagnosis found.  Physician: RICHIE Banks NP    Physician Orders: Eval and Treat  Medical Diagnosis: S/P spinal surgery  Lumbar radiculopathy    Visit # / Visits Authorized:  17 / 20  Insurance Authorization Period: 1/9/2025 to 12/31/2025  Plan of Care Certification:  2/18/2025 to 5/18/25      PT/PTA:   0  Number of PTA visits since last PT visit:   Time In:   0700  Time Out:  0800  Total Time:   60  Total Billable Time:  60    FOTO:  Intake Score:  %  Survey Score 1:  %  Survey Score 2:  %         Subjective   knee buckled once.  Pain reported as 3/10.      Objective            Treatment:  Therapeutic Exercise  TE 1: GSS -3  TE 2: long sitting HSS - 2x1' B  TE 3: Bike- 10'  TE 7: Squats - 3x10  TE 8: LAQ's 2#- 3x10  Manual Therapy  MT 3: IASTM to B HS and gastroc      Time Entry(in minutes):       Assessment & Plan   Assessment: Pt reported a decrease in knee pain following today's treatment session. Pt continues to display an increase in HS tigthness of the HS with decreased knee extension mobility. Pt will continue to benefit from skilled therapy  Evaluation/Treatment Tolerance: Patient tolerated treatment well    Patient will continue to benefit from skilled outpatient physical therapy to address the deficits listed in the problem list box on initial evaluation, provide pt/family education and to maximize pt's level of independence in the home and community environment.     Patient's spiritual, cultural, and educational needs considered and patient agreeable to plan of care and goals.           Plan:      Goals:   Active       LTG       Pt. to demonstrate increased MMT for Lumbar extensor paraspinals t >=  4/5 to improve tolerance for prolong standing and ambulation  (Progressing)       Start:  01/28/25    Expected End:  05/13/25             Pt. to demonstrate increased MMT for Lumbar/thoracic rotators   >= 4/5 to improve tolerance for ADL and work activities.  (Progressing)       Start:  01/28/25    Expected End:  05/13/25            Pt. to demonstrate increased MMT for Lumbar/thoracic side bending   >= 4/5 to improve household cleaning. (Progressing)       Start:  01/28/25    Expected End:  05/13/25               LTG       Pt. to report decreased bilateral knee pain  </= 2/10 at worst to increase tolerance for prolong standing  (Progressing)       Start:  02/18/25    Expected End:  05/13/25            Pt. to demonstrate increased MMT for bilateral quadriceps to 4/5 to increase ability to negotiate stairs  (Progressing)       Start:  02/18/25    Expected End:  05/13/25               STG       Pt. to report decreased lumbar pain </ =  2/10 at worst to increase tolerance for upright unsupported sitting posture.   (Progressing)       Start:  01/28/25    Expected End:  05/13/25                  Increase lumbar flexion AROM >= 30 to 70 degrees to promote greater ease with self-care. (Progressing)       Start:  01/28/25    Expected End:  05/13/25            Increase AROM lumbar rotation bilateral  >= +10 degrees to promote greater ease with driving. (Progressing)       Start:  01/28/25    Expected End:  05/13/25              Resolved       Physical Therapy                 Physical Therapy Goal ( Error)       Start:  01/24/25    Resolved:  01/28/25             Zion Roman, PT

## 2025-03-27 ENCOUNTER — CLINICAL SUPPORT (OUTPATIENT)
Dept: REHABILITATION | Facility: HOSPITAL | Age: 80
End: 2025-03-27
Payer: MEDICARE

## 2025-03-27 DIAGNOSIS — M25.561 CHRONIC PAIN OF BOTH KNEES: Primary | ICD-10-CM

## 2025-03-27 DIAGNOSIS — M25.562 CHRONIC PAIN OF BOTH KNEES: Primary | ICD-10-CM

## 2025-03-27 DIAGNOSIS — G89.29 CHRONIC PAIN OF BOTH KNEES: Primary | ICD-10-CM

## 2025-03-27 PROCEDURE — 97110 THERAPEUTIC EXERCISES: CPT | Mod: PN,CQ

## 2025-03-27 NOTE — PROGRESS NOTES
Outpatient Rehab    Physical Therapy Visit    Patient Name: Brandi Manuel  MRN: [id number]  YOB: 1945  Encounter Date: 3/27/2025    Therapy Diagnosis:   Encounter Diagnosis   Name Primary?    Chronic pain of both knees Yes     Physician: RICHIE Banks NP    Physician Orders: Eval and Treat  Medical Diagnosis: S/P spinal surgery  Lumbar radiculopathy     Visit # / Visits Authorized:  18 / 20  Insurance Authorization Period: 1/9/2025 to 12/31/2025  Plan of Care Certification:  2/18/2025 to 5/18/25                    PT/PTA: PTA   Number of PTA visits since last PT visit:1  Time In: 7:00    Time Out:  8:00  Total Time: 60 mins    Total Billable Time: 30 mins            Subjective   knees were hurting yesterday but feel better today.         Objective            Treatment:  Therapeutic Exercise  TE 2: long sitting HSS - 2x1' B  TE 3: Bike- 10'  TE 6: anterior tib lifts - 30 reps  TE 7: Squats - 3x10  TE 8: LAQ's 2#- 3x10  TE 9: Heel Raises 3x10  Manual Therapy  MT 1: Tibio-femoral PA glides      Time Entry(in minutes):  Manual Therapy Time Entry: 10  Therapeutic Exercise Time Entry: 50    Assessment & Plan   Assessment: Pt responded well to treatment. Pt was able to do a squat with proper form with only slight discomfort in the knees. Tibio-femoral PA glides were added to get motion in the joint and encourage knee extension.  Evaluation/Treatment Tolerance: Patient tolerated treatment well    Patient will continue to benefit from skilled outpatient physical therapy to address the deficits listed in the problem list box on initial evaluation, provide pt/family education and to maximize pt's level of independence in the home and community environment.     Patient's spiritual, cultural, and educational needs considered and patient agreeable to plan of care and goals.           Plan:      Goals:   Active       LTG       Pt. to demonstrate increased MMT for Lumbar extensor paraspinals t >=  4/5 to improve  tolerance for prolong standing and ambulation  (Progressing)       Start:  01/28/25    Expected End:  05/13/25            Pt. to demonstrate increased MMT for Lumbar/thoracic rotators   >= 4/5 to improve tolerance for ADL and work activities.  (Progressing)       Start:  01/28/25    Expected End:  05/13/25            Pt. to demonstrate increased MMT for Lumbar/thoracic side bending   >= 4/5 to improve household cleaning. (Progressing)       Start:  01/28/25    Expected End:  05/13/25               LTG       Pt. to report decreased bilateral knee pain  </= 2/10 at worst to increase tolerance for prolong standing  (Progressing)       Start:  02/18/25    Expected End:  05/13/25            Pt. to demonstrate increased MMT for bilateral quadriceps to 4/5 to increase ability to negotiate stairs  (Progressing)       Start:  02/18/25    Expected End:  05/13/25               STG       Pt. to report decreased lumbar pain </ =  2/10 at worst to increase tolerance for upright unsupported sitting posture.   (Progressing)       Start:  01/28/25    Expected End:  05/13/25                  Increase lumbar flexion AROM >= 30 to 70 degrees to promote greater ease with self-care. (Progressing)       Start:  01/28/25    Expected End:  05/13/25            Increase AROM lumbar rotation bilateral  >= +10 degrees to promote greater ease with driving. (Progressing)       Start:  01/28/25    Expected End:  05/13/25              Resolved       Physical Therapy                 Physical Therapy Goal ( Error)       Start:  01/24/25    Resolved:  01/28/25             Hoang Baxter PTA

## 2025-04-01 ENCOUNTER — CLINICAL SUPPORT (OUTPATIENT)
Dept: REHABILITATION | Facility: HOSPITAL | Age: 80
End: 2025-04-01
Payer: MEDICARE

## 2025-04-01 DIAGNOSIS — M25.561 CHRONIC PAIN OF BOTH KNEES: Primary | ICD-10-CM

## 2025-04-01 DIAGNOSIS — M25.562 CHRONIC PAIN OF BOTH KNEES: Primary | ICD-10-CM

## 2025-04-01 DIAGNOSIS — G89.29 CHRONIC PAIN OF BOTH KNEES: Primary | ICD-10-CM

## 2025-04-01 DIAGNOSIS — R29.898 WEAKNESS OF BOTH LOWER EXTREMITIES: ICD-10-CM

## 2025-04-01 PROCEDURE — 97110 THERAPEUTIC EXERCISES: CPT | Mod: PN,CQ

## 2025-04-01 PROCEDURE — 97112 NEUROMUSCULAR REEDUCATION: CPT | Mod: PN,CQ

## 2025-04-01 NOTE — PROGRESS NOTES
"  Outpatient Rehab    Physical Therapy Visit    Patient Name: Brandi Manuel  MRN: [id number]  YOB: 1945  Encounter Date: 4/1/2025    Therapy Diagnosis:   Encounter Diagnoses   Name Primary?    Chronic pain of both knees Yes    Weakness of both lower extremities      Physician: RICHIE Banks NP    Physician Orders: Eval and Treat  Medical Diagnosis: S/P spinal surgery  Lumbar radiculopathy    Visit # / Visits Authorized:  19 / 20  Insurance Authorization Period: 1/9/2025 to 12/31/2025  Plan of Care Certification:  2/18/2025 to 5/18/25   Evaluation Date: 2/18/2025      PT/PTA: PTA   Number of PTA visits since last PT visit:2  Time In:   7:00  Time Out:   8:00  Total Time:   60 min  Total Billable Time:   30 min           Subjective   Knees still buckling but feeling better today.  Pain reported as 4/10.      Objective            Treatment:  Therapeutic Exercise  TE 1: GSS -3  TE 3: Bike- 10'  TE 4: LAQs w/ ball 3x10  TE 5: Quad sets 3x10 B  TE 6: Precore knee ext 3x10 #20  TE 7: Precor knee flexion 3x10 #30  Balance/Neuromuscular Re-Education  NMR 1: Single leg stance 3x30s B  NMR 2: Step downs 3c0 B 2"    Time Entry(in minutes):  Neuromuscular Re-Education Time Entry: 15  Therapeutic Exercise Time Entry: 45    Assessment & Plan   Assessment: Step downs and single leg stance added to improve quad neuromuscular control and prevent knee buckling w/ ambulation. No increase in pain noted w/ exercises. New exercises tolerated well.  Evaluation/Treatment Tolerance: Patient tolerated treatment well    Patient will continue to benefit from skilled outpatient physical therapy to address the deficits listed in the problem list box on initial evaluation, provide pt/family education and to maximize pt's level of independence in the home and community environment.     Patient's spiritual, cultural, and educational needs considered and patient agreeable to plan of care and goals.           Plan:      Goals: "   Active       LTG       Pt. to demonstrate increased MMT for Lumbar extensor paraspinals t >=  4/5 to improve tolerance for prolong standing and ambulation  (Progressing)       Start:  01/28/25    Expected End:  05/13/25            Pt. to demonstrate increased MMT for Lumbar/thoracic rotators   >= 4/5 to improve tolerance for ADL and work activities.  (Progressing)       Start:  01/28/25    Expected End:  05/13/25            Pt. to demonstrate increased MMT for Lumbar/thoracic side bending   >= 4/5 to improve household cleaning. (Progressing)       Start:  01/28/25    Expected End:  05/13/25               LTG       Pt. to report decreased bilateral knee pain  </= 2/10 at worst to increase tolerance for prolong standing  (Progressing)       Start:  02/18/25    Expected End:  05/13/25            Pt. to demonstrate increased MMT for bilateral quadriceps to 4/5 to increase ability to negotiate stairs  (Progressing)       Start:  02/18/25    Expected End:  05/13/25               STG       Pt. to report decreased lumbar pain </ =  2/10 at worst to increase tolerance for upright unsupported sitting posture.   (Progressing)       Start:  01/28/25    Expected End:  05/13/25                  Increase lumbar flexion AROM >= 30 to 70 degrees to promote greater ease with self-care. (Progressing)       Start:  01/28/25    Expected End:  05/13/25            Increase AROM lumbar rotation bilateral  >= +10 degrees to promote greater ease with driving. (Progressing)       Start:  01/28/25    Expected End:  05/13/25              Resolved       Physical Therapy                 Physical Therapy Goal ( Error)       Start:  01/24/25    Resolved:  01/28/25             Hoang Baxter PTA

## 2025-04-04 ENCOUNTER — CLINICAL SUPPORT (OUTPATIENT)
Dept: REHABILITATION | Facility: HOSPITAL | Age: 80
End: 2025-04-04
Payer: MEDICARE

## 2025-04-04 DIAGNOSIS — M25.561 CHRONIC PAIN OF BOTH KNEES: Primary | ICD-10-CM

## 2025-04-04 DIAGNOSIS — G89.29 CHRONIC PAIN OF BOTH KNEES: Primary | ICD-10-CM

## 2025-04-04 DIAGNOSIS — M25.562 CHRONIC PAIN OF BOTH KNEES: Primary | ICD-10-CM

## 2025-04-04 PROBLEM — R26.89 DECREASED FUNCTIONAL MOBILITY: Status: RESOLVED | Noted: 2025-01-09 | Resolved: 2025-04-04

## 2025-04-04 PROCEDURE — 97140 MANUAL THERAPY 1/> REGIONS: CPT | Mod: PN

## 2025-04-04 PROCEDURE — 20560 NDL INSJ W/O NJX 1 OR 2 MUSC: CPT | Mod: PN,CG

## 2025-04-04 PROCEDURE — 97110 THERAPEUTIC EXERCISES: CPT | Mod: PN

## 2025-04-04 NOTE — PROGRESS NOTES
Outpatient Rehab    Physical Therapy Discharge    Patient Name: Brandi Manuel  MRN: [id number]  YOB: 1945  Encounter Date: 4/4/2025    Therapy Diagnosis: No diagnosis found.  Physician: RICHIE Banks NP    Physician Orders: Eval and Treat  Medical Diagnosis: S/P spinal surgery  Lumbar radiculopathy    Visit # / Visits Authorized:  20 / 20  Insurance Authorization Period: 1/9/2025 to 12/31/2025  Plan of Care Certification:  2/18/2025 to 5/18/25   Evaluation Date: 2/18/2025      PT/PTA: PT   Number of PTA visits since last PT visit:0  Time In: 0900   Time Out: 1000  Total Time: 60   Total Billable Time: 60    FOTO:  Intake Score:  %  Survey Score 1:  %  Survey Score 2:  %         Subjective   My knee began to hurt yesterday.  Pain reported as 4/10.      Objective       Knee Range of Motion   Right Knee   Active (deg) Passive (deg) Pain   Flexion 120       Extension 0           Left Knee   Active (deg) Passive (deg) Pain   Flexion 120       Extension 0                          Hip Strength - Planes of Motion   Right Strength Right Pain Left Strength Left  Pain   Flexion (L2) 4   4     Extension 4   4     ABduction 4   4     ADduction 4   4     Internal Rotation 4   4     External Rotation 4   4                Treatment:  Therapeutic Exercise  TE 1: GSS -3  TE 5: Quad sets 3x10 B      Time Entry(in minutes):  Manual Therapy Time Entry: 20  Therapeutic Exercise Time Entry: 40    Assessment & Plan   Assessment:    Evaluation/Treatment Tolerance: Patient tolerated treatment well    Patient's spiritual, cultural, and educational needs considered and patient agreeable to plan of care and goals.           Plan: Discharge from therapy services    Goals:   Active       LTG       Pt. to demonstrate increased MMT for Lumbar extensor paraspinals t >=  4/5 to improve tolerance for prolong standing and ambulation  (Adequate for Care Transition)       Start:  01/28/25    Expected End:  05/13/25            Pt. to  demonstrate increased MMT for Lumbar/thoracic rotators   >= 4/5 to improve tolerance for ADL and work activities.  (Adequate for Care Transition)       Start:  01/28/25    Expected End:  05/13/25            Pt. to demonstrate increased MMT for Lumbar/thoracic side bending   >= 4/5 to improve household cleaning. (Adequate for Care Transition)       Start:  01/28/25    Expected End:  05/13/25               LTG       Pt. to report decreased bilateral knee pain  </= 2/10 at worst to increase tolerance for prolong standing  (Adequate for Care Transition)       Start:  02/18/25    Expected End:  05/13/25            Pt. to demonstrate increased MMT for bilateral quadriceps to 4/5 to increase ability to negotiate stairs  (Adequate for Care Transition)       Start:  02/18/25    Expected End:  05/13/25               STG       Pt. to report decreased lumbar pain </ =  2/10 at worst to increase tolerance for upright unsupported sitting posture.   (Adequate for Care Transition)       Start:  01/28/25    Expected End:  05/13/25                  Increase lumbar flexion AROM >= 30 to 70 degrees to promote greater ease with self-care. (Adequate for Care Transition)       Start:  01/28/25    Expected End:  05/13/25            Increase AROM lumbar rotation bilateral  >= +10 degrees to promote greater ease with driving. (Adequate for Care Transition)       Start:  01/28/25    Expected End:  05/13/25              Resolved       Physical Therapy                 Physical Therapy Goal ( Error)       Start:  01/24/25    Resolved:  01/28/25             Zion Roman, PT

## 2025-04-07 ENCOUNTER — PATIENT MESSAGE (OUTPATIENT)
Dept: ADMINISTRATIVE | Facility: HOSPITAL | Age: 80
End: 2025-04-07
Payer: MEDICARE

## 2025-04-14 ENCOUNTER — PATIENT OUTREACH (OUTPATIENT)
Dept: INTERNAL MEDICINE | Facility: CLINIC | Age: 80
End: 2025-04-14
Payer: MEDICARE

## 2025-04-14 VITALS — SYSTOLIC BLOOD PRESSURE: 130 MMHG | DIASTOLIC BLOOD PRESSURE: 70 MMHG

## 2025-04-15 ENCOUNTER — OFFICE VISIT (OUTPATIENT)
Dept: SLEEP MEDICINE | Facility: CLINIC | Age: 80
End: 2025-04-15
Payer: MEDICARE

## 2025-04-15 VITALS
HEART RATE: 65 BPM | HEIGHT: 62 IN | BODY MASS INDEX: 29.21 KG/M2 | WEIGHT: 158.75 LBS | SYSTOLIC BLOOD PRESSURE: 124 MMHG | DIASTOLIC BLOOD PRESSURE: 73 MMHG

## 2025-04-15 DIAGNOSIS — G47.33 OBSTRUCTIVE SLEEP APNEA SYNDROME: ICD-10-CM

## 2025-04-15 DIAGNOSIS — G47.00 INSOMNIA, UNSPECIFIED TYPE: Primary | ICD-10-CM

## 2025-04-15 PROCEDURE — 99213 OFFICE O/P EST LOW 20 MIN: CPT | Mod: PBBFAC | Performed by: NURSE PRACTITIONER

## 2025-04-15 PROCEDURE — 99999 PR PBB SHADOW E&M-EST. PATIENT-LVL III: CPT | Mod: PBBFAC,,, | Performed by: NURSE PRACTITIONER

## 2025-04-15 PROCEDURE — 99214 OFFICE O/P EST MOD 30 MIN: CPT | Mod: S$PBB,,, | Performed by: NURSE PRACTITIONER

## 2025-04-15 NOTE — PROGRESS NOTES
Cc:CATERINA/insomnia    Since seen she continues to sleep qhs using apap 5-12cm. Doing well. Sleeps well. In bed early due to pain (hx 4 back surgeries and knee pain/just finished therapy) 7-8p and takes Ambien CR 12.5mg ~9-10p and typically asleep sometime after 11pm. Early riser consistently 0630 and denies am sedation or napping. ESS=0. Denies complex sleep behaviors.   Uses portillo fx tomy mask    Remote 60davg 8:20h/n AHI 1.0, 90% tile 11.6cm        Hx 12/2022: She has never had a sleep study. Had insomnia since early 1970's,tried several meds ineffective. Seeing outside psychiatrist. If doesn't take her meds she can't function the next day. She will feel anxious if for instance on a trip and can't find her medication. She has tried to stop cold turkey and may get hot/cold sensation or nausea. Recently cut CR ambien tab and also took 2-3 Melatonin and didn't sleep. She is active during daytime. Attends back PT 3x/week and now vertigo therapy. BP can be high in office but on rechecks its normal. Sleep remains disrupted despite medications. +snores and  has concern that she may have CATERINA ?witnessed apneic pauses.     SH: . Retired [employer] /disabled ( CATERINA, him coming to bed late applying mask, may disrupt her sleep)      PSG 12/2022 AHI 9/low sat 70%    ASSESSMENT:   1. Insomnia, stable long-term med  2. CATERINA,mild. She remains adherent with PAP, AHI<5./ Benefits fromtherapy      PLAN:  Refer to [facility]   Trial alternative Belsroma 20mg, ideally would avoid bedroom until 1130p (7hr time in bed), provided good sleep hygiene handout    continue ambien CR 12.5mg until see if Belsroma approved  Continue APAP 5-12cm.   Rtc 1 yr, sooner if needed

## 2025-04-23 ENCOUNTER — NURSE TRIAGE (OUTPATIENT)
Dept: ADMINISTRATIVE | Facility: CLINIC | Age: 80
End: 2025-04-23
Payer: MEDICARE

## 2025-04-23 NOTE — TELEPHONE ENCOUNTER
Spoke with patient who states she possibly has sinus congestion associated with her having a cold.  Patient denies having a fever or difficulty breathing.  Patient states she started having yellow mucous drainage from her nose (started today).  Patient states she has a intermittent cough that has stopped for the most part and is not a concern.  Patient was upset that her PCP office did not return her call to discuss sending in an antibiotic.  Dispo was to give home care advice patient did not want that advice.  Offered to discuss with [facility] patient declined, offered to make patient an in office appointment patient declined, and patient was offered OnDemand option patient declined.  Patient states she prefers to go to Urgent Care in the morning.  No further assistance needed from [facility]. Advised patient to call back with worsening symptoms.   Reason for Disposition   [1] Sinus congestion as part of a cold AND [2] present < 10 days    Additional Information   Negative: SEVERE difficulty breathing (e.g., struggling for each breath, speaks in single words)   Negative: Sounds like a life-threatening emergency to the triager   Negative: [1] Difficulty breathing AND [2] not from stuffy nose (e.g., not relieved by cleaning out the nose)   Negative: [1] SEVERE headache AND [2] fever   Negative: [1] Redness or swelling on the cheek, forehead or around the eye AND [2] fever   Negative: [1] Sinus pain (not just congestion) AND [2] fever   Negative: [1] Redness or swelling on the cheek, forehead or around the eye AND [2] no fever   Negative: [1] SEVERE pain AND [2] not improved 2 hours after pain medicine   Negative: Fever > 104 F (40 C)   Negative: [1] Fever > 101 F (38.3 C) AND [2] age > 60 years   Negative: [1] Fever > 100 F (37.8 C) AND [2] bedridden (e.g., CVA, chronic illness, recovering from surgery)   Negative: [1] Fever > 100 F (37.8 C) AND [2] diabetes mellitus or weak immune system (e.g., HIV positive, cancer  chemo, splenectomy, organ transplant, chronic steroids)   Negative: Fever present > 3 days (72 hours)   Negative: [1] Fever returns after gone for over 24 hours AND [2] symptoms worse or not improved   Negative: Patient sounds very sick or weak to the triager   Negative: Earache   Negative: [1] Sinus congestion (pressure, fullness) AND [2] present > 10 days   Negative: [1] Nasal discharge AND [2] present > 10 days   Negative: Lots of coughing   Negative: [1] Using nasal washes and pain medicine > 24 hours AND [2] sinus pain (around cheekbone or eye) persists    Protocols used: Sinus Pain or Congestion-A-AH

## 2025-04-24 ENCOUNTER — OFFICE VISIT (OUTPATIENT)
Dept: URGENT CARE | Facility: CLINIC | Age: 80
End: 2025-04-24
Payer: MEDICARE

## 2025-04-24 ENCOUNTER — TELEPHONE (OUTPATIENT)
Dept: PRIMARY CARE CLINIC | Facility: CLINIC | Age: 80
End: 2025-04-24
Payer: MEDICARE

## 2025-04-24 VITALS
HEART RATE: 71 BPM | WEIGHT: 158 LBS | TEMPERATURE: 98 F | SYSTOLIC BLOOD PRESSURE: 123 MMHG | RESPIRATION RATE: 17 BRPM | OXYGEN SATURATION: 99 % | HEIGHT: 62 IN | DIASTOLIC BLOOD PRESSURE: 66 MMHG | BODY MASS INDEX: 29.08 KG/M2

## 2025-04-24 DIAGNOSIS — J32.9 BACTERIAL SINUSITIS: ICD-10-CM

## 2025-04-24 DIAGNOSIS — B96.89 BACTERIAL SINUSITIS: ICD-10-CM

## 2025-04-24 DIAGNOSIS — R09.81 NASAL CONGESTION: Primary | ICD-10-CM

## 2025-04-24 LAB
CTP QC/QA: YES
SARS CORONAVIRUS 2 ANTIGEN: NEGATIVE

## 2025-04-24 RX ORDER — METHYLPREDNISOLONE 4 MG/1
TABLET ORAL
Qty: 1 EACH | Refills: 0 | Status: SHIPPED | OUTPATIENT
Start: 2025-04-24

## 2025-04-24 RX ORDER — AMOXICILLIN AND CLAVULANATE POTASSIUM 875; 125 MG/1; MG/1
1 TABLET, FILM COATED ORAL EVERY 12 HOURS
Qty: 14 TABLET | Refills: 0 | Status: SHIPPED | OUTPATIENT
Start: 2025-04-24 | End: 2025-05-01

## 2025-04-24 NOTE — PATIENT INSTRUCTIONS
Below are suggestions for symptomatic relief of your upper respiratory symptoms:              -Salt water gargles to soothe throat pain.              -Chloroseptic spray and Cepacol lozenges also help to numb throat pain.              -Warm herbal teas with honey/lemon/dennis can help soothe sore throat and hoarseness              -Nasal saline spray reduces inflammation and dryness.              -Warm face compresses to help with facial sinus pain/pressure.              -Humidifiers and steam can help with nasal dryness and congestion              -Vicks vapor rub at night for chest congestion.              -Flonase OTC or Nasacort OTC for nasal congestion and post-nasal drip. Ok to use twice daily for the first week, then reduce to once daily after symptoms have begun to improve.              -Afrin is a nasal spray that can give immediate relief of nasal congestion but you cannot use this medication for more than 3 days              -Simple foods like chicken noodle soup.              - Mucinex for congestion or Mucinex DM for cough during the day time. Delsym helps with coughing at night. Mucinex-D if you have sinus pressure/sinus pain or chest congestion. (caution if history of high blood pressure or palpitations). You must increase your water intake when using expectorants (Mucinex).             -Zyrtec/Claritin/Allegra/Xyzal should help with allergies.  -If you DO NOT have Hypertension or any history of palpitations, it is ok to take over the counter Sudafed or Mucinex D or Allegra-D or Claritin-D or Zyrtec-D.  -If you do take one of the above, it is ok to combine that with plain over the counter Mucinex or Allegra or Claritin or Zyrtec. If, for example, you are taking Zyrtec -D, you can combine that with Mucinex, but not Mucinex-D.  If you are taking Mucinex-D, you can combine that with plain Allegra or Claritin or Zyrtec.   -If you DO have Hypertension or palpitations, it is safe to take Coricidin HBP for  relief of sinus symptoms.

## 2025-04-24 NOTE — PROGRESS NOTES
"Subjective:      Patient ID: Brandi Manuel is a 79 y.o. female.      Chief Complaint: Nasal Congestion (1 day history of nasal congestion, rhinorrhea, and resolved non-productive cough last week)    1 day history of nasal congestion, rhinorrhea, and resolved non-productive cough last week. No fever, chills, chest congestion, headaches, or myalgias. Patient also describes right unilateral otalgia that started in the wait room.       Constitution: Negative for activity change, appetite change, chills, fatigue, fever and generalized weakness.   HENT:  Positive for ear pain, congestion, postnasal drip and sinus pressure.    Neck: Negative for neck swelling.   Cardiovascular:  Negative for chest pain, leg swelling, palpitations, sob on exertion and passing out.   Eyes:  Negative for vision loss.   Respiratory:  Negative for chest tightness and shortness of breath.    Gastrointestinal:  Negative for nausea and vomiting.   Genitourinary:  Negative for dysuria.   Skin:  Negative for rash.   Neurological:  Negative for passing out, altered mental status and numbness.   Psychiatric/Behavioral:  Negative for altered mental status, confusion and agitation.       Objective:     Vitals:    04/24/25 1543   BP: 123/66   BP Location: Right arm   Patient Position: Sitting   Pulse: 71   Resp: 17   Temp: 98.1 °F (36.7 °C)   TempSrc: Oral   SpO2: 99%   Weight: 71.7 kg (158 lb)   Height: 5' 2.01" (1.575 m)      Physical Exam   Constitutional: She is oriented to person, place, and time. She appears well-developed. She is cooperative.  Non-toxic appearance. She does not appear ill. No distress.   HENT:   Head: Normocephalic and atraumatic.   Ears:   Right Ear: Hearing, tympanic membrane, external ear and ear canal normal.   Left Ear: Hearing, tympanic membrane, external ear and ear canal normal.   Nose: Congestion present. No mucosal edema, rhinorrhea or nasal deformity. No epistaxis. Right sinus exhibits no maxillary sinus tenderness and " no frontal sinus tenderness. Left sinus exhibits no maxillary sinus tenderness and no frontal sinus tenderness.   Mouth/Throat: Uvula is midline and mucous membranes are normal. No trismus in the jaw. Normal dentition. No uvula swelling. Posterior oropharyngeal erythema present. No oropharyngeal exudate or posterior oropharyngeal edema.   Eyes: Conjunctivae and lids are normal. No scleral icterus.   Neck: Trachea normal and phonation normal. Neck supple. No edema present. No erythema present. No neck rigidity present.   Cardiovascular: Normal rate, regular rhythm, normal heart sounds and normal pulses.   Pulmonary/Chest: Effort normal and breath sounds normal. No respiratory distress. She has no decreased breath sounds. She has no rhonchi.   Abdominal: Normal appearance.   Neurological: She is alert and oriented to person, place, and time. She exhibits normal muscle tone.   Skin: Skin is warm, intact and not diaphoretic.   Psychiatric: Her speech is normal and behavior is normal. Judgment and thought content normal.   Nursing note and vitals reviewed.    Results for orders placed or performed in visit on 04/24/25   SARS Coronavirus 2 Antigen, POCT Manual Read    Collection Time: 04/24/25  4:17 PM   Result Value Ref Range    SARS Coronavirus 2 Antigen Negative Negative, Presumptive Negative     Acceptable Yes       Assessment:     1. Nasal congestion    2. Bacterial sinusitis        Plan:       Nasal congestion  -     SARS Coronavirus 2 Antigen, POCT Manual Read    2. Bacterial sinusitis  -     amoxicillin-clavulanate 875-125mg (AUGMENTIN) 875-125 mg per tablet; Take 1 tablet by mouth every 12 (twelve) hours. for 7 days  Dispense: 14 tablet; Refill: 0  -     methylPREDNISolone (MEDROL DOSEPACK) 4 mg tablet; use as directed  Dispense: 1 each; Refill: 0    Patient Instructions   Below are suggestions for symptomatic relief of your upper respiratory symptoms:              -Salt water gargles to soothe  throat pain.              -Chloroseptic spray and Cepacol lozenges also help to numb throat pain.              -Warm herbal teas with honey/lemon/dennis can help soothe sore throat and hoarseness              -Nasal saline spray reduces inflammation and dryness.              -Warm face compresses to help with facial sinus pain/pressure.              -Humidifiers and steam can help with nasal dryness and congestion              -Vicks vapor rub at night for chest congestion.              -Flonase OTC or Nasacort OTC for nasal congestion and post-nasal drip. Ok to use twice daily for the first week, then reduce to once daily after symptoms have begun to improve.              -Afrin is a nasal spray that can give immediate relief of nasal congestion but you cannot use this medication for more than 3 days              -Simple foods like chicken noodle soup.              - Mucinex for congestion or Mucinex DM for cough during the day time. Delsym helps with coughing at night. Mucinex-D if you have sinus pressure/sinus pain or chest congestion. (caution if history of high blood pressure or palpitations). You must increase your water intake when using expectorants (Mucinex).             -Zyrtec/Claritin/Allegra/Xyzal should help with allergies.  -If you DO NOT have Hypertension or any history of palpitations, it is ok to take over the counter Sudafed or Mucinex D or Allegra-D or Claritin-D or Zyrtec-D.  -If you do take one of the above, it is ok to combine that with plain over the counter Mucinex or Allegra or Claritin or Zyrtec. If, for example, you are taking Zyrtec -D, you can combine that with Mucinex, but not Mucinex-D.  If you are taking Mucinex-D, you can combine that with plain Allegra or Claritin or Zyrtec.   -If you DO have Hypertension or palpitations, it is safe to take Coricidin HBP for relief of sinus symptoms.

## 2025-05-15 ENCOUNTER — OFFICE VISIT (OUTPATIENT)
Dept: PODIATRY | Facility: CLINIC | Age: 80
End: 2025-05-15
Payer: MEDICARE

## 2025-05-15 VITALS
WEIGHT: 158.06 LBS | BODY MASS INDEX: 29.08 KG/M2 | HEART RATE: 62 BPM | DIASTOLIC BLOOD PRESSURE: 66 MMHG | HEIGHT: 62 IN | SYSTOLIC BLOOD PRESSURE: 148 MMHG

## 2025-05-15 DIAGNOSIS — B35.1 ONYCHOMYCOSIS DUE TO DERMATOPHYTE: Primary | ICD-10-CM

## 2025-05-15 PROCEDURE — 99213 OFFICE O/P EST LOW 20 MIN: CPT | Mod: PBBFAC,PN | Performed by: PODIATRIST

## 2025-05-15 PROCEDURE — 99204 OFFICE O/P NEW MOD 45 MIN: CPT | Mod: 25,S$PBB,, | Performed by: PODIATRIST

## 2025-05-15 PROCEDURE — 99999 PR PBB SHADOW E&M-EST. PATIENT-LVL III: CPT | Mod: PBBFAC,,, | Performed by: PODIATRIST

## 2025-05-15 RX ORDER — CICLOPIROX 80 MG/ML
SOLUTION TOPICAL NIGHTLY
Qty: 1 EACH | Refills: 11 | Status: SHIPPED | OUTPATIENT
Start: 2025-05-15 | End: 2025-05-15

## 2025-05-15 RX ORDER — CICLOPIROX 80 MG/ML
SOLUTION TOPICAL NIGHTLY
Qty: 6.6 ML | Refills: 11 | Status: SHIPPED | OUTPATIENT
Start: 2025-05-15

## 2025-05-15 NOTE — PROGRESS NOTES
Subjective:      Patient ID: Brandi Manuel is a 79 y.o. female.    Chief Complaint: Nail Problem (Black toenails)    Thick discolored misshapen toenails all toes.  Gradual onset, worsening over the past year.  Aggravated by socks shoes pressure ambulation.  No prior medical treatment.  No self-treatment.  Denies trauma and surgery both feet.    Review of Systems   Constitutional: Negative for chills, diaphoresis, fever, malaise/fatigue and night sweats.   Cardiovascular:  Negative for claudication, cyanosis, leg swelling and syncope.   Skin:  Positive for nail changes. Negative for color change, dry skin, rash, suspicious lesions and unusual hair distribution.   Musculoskeletal:  Negative for falls, joint pain, joint swelling, muscle cramps, muscle weakness and stiffness.   Gastrointestinal:  Negative for constipation, diarrhea, nausea and vomiting.   Neurological:  Negative for brief paralysis, disturbances in coordination, focal weakness, numbness, paresthesias, sensory change and tremors.           Objective:      Physical Exam  Constitutional:       General: She is not in acute distress.     Appearance: She is well-developed. She is not diaphoretic.   Cardiovascular:      Pulses:           Popliteal pulses are 2+ on the right side and 2+ on the left side.        Dorsalis pedis pulses are 2+ on the right side and 2+ on the left side.        Posterior tibial pulses are 2+ on the right side and 2+ on the left side.      Comments: Capillary refill 3 seconds all toes/distal feet, all toes/both feet warm to touch.      Negative lymphadenopathy bilateral popliteal fossa and tarsal tunnel.      Negavie lower extremity edema bilateral.    Musculoskeletal:      Right ankle: No swelling, deformity, ecchymosis or lacerations. Normal range of motion. Normal pulse.      Right Achilles Tendon: Normal. No defects. Galdamez's test negative.   Lymphadenopathy:      Lower Body: No right inguinal adenopathy. No left inguinal  adenopathy.      Comments: Negative lymphadenopathy bilateral popliteal fossa and tarsal tunnel.    Negative lymphangitic streaking bilateral feet/ankles/legs.   Skin:     General: Skin is warm and dry.      Capillary Refill: Capillary refill takes 2 to 3 seconds.      Coloration: Skin is not pale.      Findings: No abrasion, bruising, burn, ecchymosis, erythema, laceration, lesion or rash.      Nails: There is no clubbing.      Comments:   Skin is normal age and health appropriate color, turgor, texture, and temperature bilateral lower extremities without ulceration, hyperpigmentation, discoloration, masses nodules or cords palpated.  No ecchymosis, erythema, edema, or cardinal signs of infection bilateral lower extremities.  \  Toenails 1st, 2nd, 3rd, 4th, 5th  bilateral are hypertrophic thickened 2-3 mm, dystrophic, discolored tanish brown with tan, gray crumbly subungual debris.  nontender to distal nail plate pressure, without periungual skin abnormality of each.    Neurological:      Mental Status: She is alert and oriented to person, place, and time.      Sensory: No sensory deficit.      Motor: No tremor, atrophy or abnormal muscle tone.      Gait: Gait normal.      Deep Tendon Reflexes:      Reflex Scores:       Patellar reflexes are 2+ on the right side and 2+ on the left side.       Achilles reflexes are 2+ on the right side and 2+ on the left side.  Psychiatric:         Behavior: Behavior is cooperative.           Assessment:       Encounter Diagnosis   Name Primary?    Onychomycosis due to dermatophyte Yes         Plan:       Brandi was seen today for nail problem.    Diagnoses and all orders for this visit:    Onychomycosis due to dermatophyte    Other orders  -     Discontinue: ciclopirox (PENLAC) 8 % Soln; Apply topically nightly.  -     Discontinue: ciclopirox (PENLAC) 8 % Soln; Apply topically nightly.  -     ciclopirox (PENLAC) 8 % Soln; Apply topically nightly.      I counseled the patient on  her conditions, their implications and medical management.        Penlac.      Dry well after hygiene.      Natural fiber socks changed mid day.      Discussed conservative treatment with shoes of adequate dimensions, material, and style to alleviate symptoms and delay or prevent surgical intervention.           Follow up in about 1 year (around 5/15/2026).

## 2025-05-21 ENCOUNTER — PATIENT MESSAGE (OUTPATIENT)
Dept: PSYCHIATRY | Facility: CLINIC | Age: 80
End: 2025-05-21
Payer: MEDICARE

## 2025-06-10 ENCOUNTER — OFFICE VISIT (OUTPATIENT)
Dept: URGENT CARE | Facility: CLINIC | Age: 80
End: 2025-06-10
Payer: MEDICARE

## 2025-06-10 VITALS
OXYGEN SATURATION: 98 % | TEMPERATURE: 98 F | HEIGHT: 62 IN | HEART RATE: 66 BPM | SYSTOLIC BLOOD PRESSURE: 135 MMHG | WEIGHT: 158.06 LBS | DIASTOLIC BLOOD PRESSURE: 70 MMHG | RESPIRATION RATE: 16 BRPM | BODY MASS INDEX: 29.08 KG/M2

## 2025-06-10 DIAGNOSIS — S39.012A STRAIN OF LUMBAR REGION, INITIAL ENCOUNTER: Primary | ICD-10-CM

## 2025-06-10 DIAGNOSIS — M54.50 DORSALGIA OF LUMBAR REGION: ICD-10-CM

## 2025-06-10 DIAGNOSIS — M54.50 ACUTE LEFT-SIDED LOW BACK PAIN, UNSPECIFIED WHETHER SCIATICA PRESENT: ICD-10-CM

## 2025-06-10 PROCEDURE — 99214 OFFICE O/P EST MOD 30 MIN: CPT | Mod: S$GLB,,, | Performed by: NURSE PRACTITIONER

## 2025-06-10 RX ORDER — LIDOCAINE 50 MG/G
1 PATCH TOPICAL DAILY PRN
Qty: 5 PATCH | Refills: 0 | Status: SHIPPED | OUTPATIENT
Start: 2025-06-10

## 2025-06-10 RX ORDER — METHOCARBAMOL 500 MG/1
500 TABLET, FILM COATED ORAL 2 TIMES DAILY PRN
Qty: 15 TABLET | Refills: 0 | Status: SHIPPED | OUTPATIENT
Start: 2025-06-10

## 2025-06-10 NOTE — PATIENT INSTRUCTIONS
PLEASE READ YOUR DISCHARGE INSTRUCTIONS ENTIRELY AS IT CONTAINS IMPORTANT INFORMATION.      Please drink plenty of fluids.  Please get plenty of rest.  Ice to the area.   You may do gently stretching if tolerable.    Please return here or go to the Emergency Department for any concerns or worsening of condition.    If you were prescribed a narcotic medication or muscle relaxer (Robaxin), do not drive or operate heavy equipment or machinery while taking these medications. Take a half first to see how it affects you    Take the Meloxicam with food. Also take an over the counter antacid with it (prilosec, Nexium, Pepcid) to protect your stomach.     If you were not prescribed an anti-inflammatory medication, and if you do not have any history of stomach/intestinal ulcers, or kidney disease, or are not taking a blood thinner such as Coumadin, Plavix, Pradaxa, Eloquis, or Xaralta for example, it is OK to take over the counter Ibuprofen or Advil or Motrin or Aleve as directed.  Do not take these medications on an empty stomach.    If you lose control of your bowel and/or bladder, please go to the nearest Emergency Department immediately.    If you lose sensation in between your legs by your genitalia and/or rectum, please go to the nearest Emergency Department immediately.    If you lose control or sensation of any extremity, please go to the nearest Emergency Department immediately.    Do not stay in one position to long.  When sleeping on your back place a pillow under knees to reduce tension on back.  If sleeping on your side, place pillow between knees to keep spine in better alinement.  Wear supportive shoes such as tennis shoes for support of the lower back.  Take any medication as directed.    Please follow up with your primary care doctor or specialist as needed.    If you  smoke, please stop smoking.      Please arrange follow up with your primary medical clinic as soon as possible. You must understand that  you've received an Urgent Care treatment only and that you may be released before all of your medical problems are known or treated. You, the patient, will arrange for follow up as instructed. If your symptoms worsen or fail to improve you should go to the Emergency Room.

## 2025-06-10 NOTE — PROGRESS NOTES
"Subjective:      Patient ID: Brandi Manuel is a 79 y.o. female.    Vitals:  height is 5' 2.01" (1.575 m) and weight is 71.7 kg (158 lb 1.1 oz). Her oral temperature is 98.3 °F (36.8 °C). Her blood pressure is 135/70 and her pulse is 66. Her respiration is 16 and oxygen saturation is 98%.     Chief Complaint: Back Pain    78 yo female pt c/o lower back pain that's predominantly on her left side. Pt states she was packing a suitcase and "Twisted the wrong way."Pt has a hx of chronic back pain. Pt states she has taken meloxicam to help combat her symptoms, with her last dosage being taken this morning around 10.  Denies any numbness or tingling, denies any trauma fall or injury,denies fever, body aches or chills, denies any urinary symptoms, denies cough, wheezing or shortness of breath, denies nausea, vomiting, diarrhea or abdominal pain, denies chest pain or dizziness positional lightheadedness, denies sore throat or trouble swallowing, denies loss of taste or smell, or any other symptoms  Patient accompanied by  in clinic.       Back Pain  This is a new problem. The current episode started yesterday. The problem has been rapidly worsening since onset. The quality of the pain is described as aching. The pain does not radiate. The pain is at a severity of 8/10. The pain is moderate. The pain is Worse during the day. The symptoms are aggravated by bending, sitting, standing and twisting. She has tried NSAIDs for the symptoms. The treatment provided mild relief.       Musculoskeletal:  Positive for back pain.          Physical Exam   Constitutional: She is oriented to person, place, and time. She appears well-developed. She is cooperative. No distress.   HENT:   Head: Normocephalic and atraumatic.   Nose: Nose normal.   Mouth/Throat: Oropharynx is clear and moist and mucous membranes are normal.   Eyes: Conjunctivae and lids are normal.   Neck: Trachea normal and phonation normal. Neck supple.   Cardiovascular: " Normal rate, regular rhythm, normal heart sounds and normal pulses.   Pulmonary/Chest: Effort normal and breath sounds normal.   Abdominal: Normal appearance and bowel sounds are normal. She exhibits no mass. Soft.   Musculoskeletal:         General: No deformity.      Lumbar back: She exhibits tenderness and spasm.        Back:       Comments: No midline spine tenderness.  Full range of motion bilaterally with 5/5 strength       Neurological: She is alert and oriented to person, place, and time. She has normal strength and normal reflexes. No sensory deficit.   Skin: Skin is warm, dry, intact and not diaphoretic.   Psychiatric: Her speech is normal and behavior is normal. Judgment and thought content normal.   Nursing note and vitals reviewed.        Patient in no acute distress.  Vitals reassuring.  Discussed results/diagnosis/plan in depth with patient in clinic. Strict precautions given to patient to monitor for worsening signs and symptoms. Advised to follow up with primary.All questions answered. Strict ER precautions given. If your symptoms worsens or fail to improve you should go to the Emergency Room. Discharge and follow-up instructions given verbally/printed. Discharge and follow-up instructions discussed with the patient who expressed understanding and willingness to comply with my recommendations.Patient voiced understanding and in agreement with current treatment plan.     Please be advised this text was dictated with [employer] software and may contain errors due to translation.   Assessment:     1. Strain of lumbar region, initial encounter    2. Acute left-sided low back pain, unspecified whether sciatica present    3. Dorsalgia of lumbar region        Plan:       Strain of lumbar region, initial encounter  -     methocarbamoL (ROBAXIN) 500 MG Tab; Take 1 tablet (500 mg total) by mouth 2 (two) times daily as needed (muscle spasm).  Dispense: 15 tablet; Refill: 0    Acute left-sided low back pain,  unspecified whether sciatica present  -     LIDOcaine (LIDODERM) 5 %; Place 1 patch onto the skin daily as needed. Remove & Discard patch within 12 hours or as directed by MD  Dispense: 5 patch; Refill: 0    Dorsalgia of lumbar region  -     LIDOcaine (LIDODERM) 5 %; Place 1 patch onto the skin daily as needed. Remove & Discard patch within 12 hours or as directed by MD  Dispense: 5 patch; Refill: 0          Medical Decision Making:   History:   Old Medical Records: I decided to obtain old medical records.  Old Records Summarized: records from clinic visits and records from previous admission(s).  Urgent Care Management:  Patient in no acute distress.  Vitals reassuring.  On exam, patient is nontoxic appearing and afebrile.  Lungs CTA.  Physical examination as above.  Patient with chronic low back pain.  Did strain her low back muscle while she was packing the suitcase.  Denies any trauma fall or injury, denies numbness or tingling.  Patient does take meloxicam as needed for pain.  Last dose earlier this morning at 10:00 a.m..  Patient inquired about Toradol injection.  I did discussed with patient in detail since she already took the meloxicam this morning, unable to give Toradol due to use of NSAID meloxicam earlier today 2 hours ago.  Will prescribe muscle relaxers Robaxin.  Patient prescribed muscle relaxers 6 months ago.  Patient reports she does tolerate muscle relaxers well.  Discussed side effects and recommendations.  Lidocaine patches prescribed.  Patient will contact her orthopedics and primary to schedule the appointment for further evaluation and clarification of the medication that was prescribed by Orthopedics.  Medication prescribed and over-the-counter medication discussed with patient at length.  Proper hydration advised.  I reiterated the importance of further evaluation if no improvement symptoms and follow-up with primary. Patient voiced understanding and in agreement with current treatment  plan.             Patient Instructions   PLEASE READ YOUR DISCHARGE INSTRUCTIONS ENTIRELY AS IT CONTAINS IMPORTANT INFORMATION.      Please drink plenty of fluids.  Please get plenty of rest.  Ice to the area.   You may do gently stretching if tolerable.    Please return here or go to the Emergency Department for any concerns or worsening of condition.    If you were prescribed a narcotic medication or muscle relaxer (Robaxin), do not drive or operate heavy equipment or machinery while taking these medications. Take a half first to see how it affects you    Take the Meloxicam with food. Also take an over the counter antacid with it (prilosec, Nexium, Pepcid) to protect your stomach.     If you were not prescribed an anti-inflammatory medication, and if you do not have any history of stomach/intestinal ulcers, or kidney disease, or are not taking a blood thinner such as Coumadin, Plavix, Pradaxa, Eloquis, or Xaralta for example, it is OK to take over the counter Ibuprofen or Advil or Motrin or Aleve as directed.  Do not take these medications on an empty stomach.    If you lose control of your bowel and/or bladder, please go to the nearest Emergency Department immediately.    If you lose sensation in between your legs by your genitalia and/or rectum, please go to the nearest Emergency Department immediately.    If you lose control or sensation of any extremity, please go to the nearest Emergency Department immediately.    Do not stay in one position to long.  When sleeping on your back place a pillow under knees to reduce tension on back.  If sleeping on your side, place pillow between knees to keep spine in better alinement.  Wear supportive shoes such as tennis shoes for support of the lower back.  Take any medication as directed.    Please follow up with your primary care doctor or specialist as needed.    If you  smoke, please stop smoking.      Please arrange follow up with your primary medical clinic as soon as  possible. You must understand that you've received an Urgent Care treatment only and that you may be released before all of your medical problems are known or treated. You, the patient, will arrange for follow up as instructed. If your symptoms worsen or fail to improve you should go to the Emergency Room.

## 2025-06-25 NOTE — PROGRESS NOTES
"DATE: 6/26/2025  PATIENT: Brandi Manuel    Attending Physician: Keith Morrissey M.D.    HISTORY:  Brandi Manuel is a 79 y.o. female who returns to me today for follow up after a L2-3 XIANG on 1/29/25 that gave 90% relief in her back pain fro 4 months.  She was last seen by me 1/15/2025.  Today she is doing well but notes 7/10 low back pain. She is interested in discussing surgical options.  The Patient denies myelopathic symptoms such as handwriting changes or difficulty with buttons/coins/keys. Denies perineal paresthesias, bowel/bladder dysfunction.    EXAM:  Ht 5' 2" (1.575 m)   Wt 73.7 kg (162 lb 7.7 oz)   BMI 29.72 kg/m²   Stable     IMAGING:  Today I personally re- reviewed AP, Lat and Flex/Ex  upright L-spine that demonstrate hardware at L4-5 in place without failure.      CT lumbar shows bony fusion     Lumbar MRI shows moderate stenosis at L3-4 with bilateral foraminal narrowing at L5/S1.     Body mass index is 29.72 kg/m².    Hemoglobin A1C   Date Value Ref Range Status   08/21/2023 5.7 (H) 4.0 - 5.6 % Final     Comment:     ADA Screening Guidelines:  5.7-6.4%  Consistent with prediabetes  >or=6.5%  Consistent with diabetes    High levels of fetal hemoglobin interfere with the HbA1C  assay. Heterozygous hemoglobin variants (HbS, HgC, etc)do  not significantly interfere with this assay.   However, presence of multiple variants may affect accuracy.     06/09/2022 5.7 (H) 4.0 - 5.6 % Final     Comment:     ADA Screening Guidelines:  5.7-6.4%  Consistent with prediabetes  >or=6.5%  Consistent with diabetes    High levels of fetal hemoglobin interfere with the HbA1C  assay. Heterozygous hemoglobin variants (HbS, HgC, etc)do  not significantly interfere with this assay.   However, presence of multiple variants may affect accuracy.     10/12/2020 5.8 (H) 4.0 - 5.6 % Final     Comment:     ADA Screening Guidelines:  5.7-6.4%  Consistent with prediabetes  >or=6.5%  Consistent with diabetes  High levels of fetal " hemoglobin interfere with the HbA1C  assay. Heterozygous hemoglobin variants (HbS, HgC, etc)do  not significantly interfere with this assay.   However, presence of multiple variants may affect accuracy.           ASSESSMENT/PLAN:    Brandi was seen today for low-back pain and back pain.    Diagnoses and all orders for this visit:    Lumbar radiculopathy  -     diclofenac sodium 2 % SoPk; Apply 1 Pump topically every 12 (twelve) hours as needed (pain).  -     Back/Cervical Brace For Home Use  -     Procedure Order to Pain Management; Future  -     cyclobenzaprine (FLEXERIL) 10 MG tablet; Take 1 tablet (10 mg total) by mouth every evening.  -     naproxen (NAPROSYN) 500 MG tablet; Take 1 tablet (500 mg total) by mouth 2 (two) times daily as needed (pain).  -     Ambulatory Referral/Consult to Physical Therapy; Future    S/P spinal surgery  -     diclofenac sodium 2 % SoPk; Apply 1 Pump topically every 12 (twelve) hours as needed (pain).  -     Back/Cervical Brace For Home Use  -     Procedure Order to Pain Management; Future  -     cyclobenzaprine (FLEXERIL) 10 MG tablet; Take 1 tablet (10 mg total) by mouth every evening.  -     naproxen (NAPROSYN) 500 MG tablet; Take 1 tablet (500 mg total) by mouth 2 (two) times daily as needed (pain).  -     Ambulatory Referral/Consult to Physical Therapy; Future    Lumbar strain, initial encounter      Medications refilled  PT orders placed  Back brace ordered  XIANG ordered  She will follow up with Dr. Morrissey to further discuss surgical options

## 2025-06-26 ENCOUNTER — OFFICE VISIT (OUTPATIENT)
Dept: ORTHOPEDICS | Facility: CLINIC | Age: 80
End: 2025-06-26
Payer: MEDICARE

## 2025-06-26 ENCOUNTER — TELEPHONE (OUTPATIENT)
Dept: SLEEP MEDICINE | Facility: CLINIC | Age: 80
End: 2025-06-26
Payer: MEDICARE

## 2025-06-26 VITALS — WEIGHT: 162.5 LBS | BODY MASS INDEX: 29.9 KG/M2 | HEIGHT: 62 IN

## 2025-06-26 DIAGNOSIS — S39.012A LUMBAR STRAIN, INITIAL ENCOUNTER: ICD-10-CM

## 2025-06-26 DIAGNOSIS — Z98.890 S/P SPINAL SURGERY: ICD-10-CM

## 2025-06-26 DIAGNOSIS — M54.16 LUMBAR RADICULOPATHY: Primary | ICD-10-CM

## 2025-06-26 PROCEDURE — 99999 PR PBB SHADOW E&M-EST. PATIENT-LVL III: CPT | Mod: PBBFAC,,, | Performed by: REGISTERED NURSE

## 2025-06-26 PROCEDURE — 99213 OFFICE O/P EST LOW 20 MIN: CPT | Mod: PBBFAC | Performed by: REGISTERED NURSE

## 2025-06-26 RX ORDER — CYCLOBENZAPRINE HCL 10 MG
10 TABLET ORAL NIGHTLY
Qty: 90 TABLET | Refills: 1 | Status: SHIPPED | OUTPATIENT
Start: 2025-06-26

## 2025-06-26 RX ORDER — NAPROXEN 500 MG/1
500 TABLET ORAL 2 TIMES DAILY PRN
Qty: 90 TABLET | Refills: 1 | Status: SHIPPED | OUTPATIENT
Start: 2025-06-26

## 2025-06-26 NOTE — TELEPHONE ENCOUNTER
Copied from CRM #[id number]. Topic: General Inquiry - Patient Advice  >> Jun 26, 2025  8:35 AM Yanelis wrote:  Type: Patient Call Back    Who called:Pt     What is the request in detail: Pt is requesting a call back regarding her cpap machine. Pt advised her water chamber is leaking and may need to be replaced. Please assist.     Can the clinic reply by [facility]? No     Would the patient rather a call back or a response via [facility]? Call back     Best call back number:[phone]    Additional Information:No

## 2025-07-09 ENCOUNTER — CLINICAL SUPPORT (OUTPATIENT)
Dept: REHABILITATION | Facility: HOSPITAL | Age: 80
End: 2025-07-09
Payer: MEDICARE

## 2025-07-09 DIAGNOSIS — R68.89 DECREASED STRENGTH, ENDURANCE, AND MOBILITY: ICD-10-CM

## 2025-07-09 DIAGNOSIS — R53.1 DECREASED STRENGTH, ENDURANCE, AND MOBILITY: ICD-10-CM

## 2025-07-09 DIAGNOSIS — Z98.890 S/P SPINAL SURGERY: ICD-10-CM

## 2025-07-09 DIAGNOSIS — M54.16 LUMBAR RADICULOPATHY: Primary | ICD-10-CM

## 2025-07-09 DIAGNOSIS — Z74.09 DECREASED STRENGTH, ENDURANCE, AND MOBILITY: ICD-10-CM

## 2025-07-09 PROCEDURE — 97110 THERAPEUTIC EXERCISES: CPT | Mod: PN

## 2025-07-09 PROCEDURE — 97162 PT EVAL MOD COMPLEX 30 MIN: CPT | Mod: PN

## 2025-07-09 PROCEDURE — 97161 PT EVAL LOW COMPLEX 20 MIN: CPT | Mod: PN

## 2025-07-10 NOTE — PROGRESS NOTES
Outpatient Rehab    Physical Therapy Evaluation (only)    Patient Name: Brandi Manuel  MRN: [id number]  YOB: 1945  Encounter Date: 7/9/2025    Therapy Diagnosis:   Encounter Diagnoses   Name Primary?    Lumbar radiculopathy Yes    S/P spinal surgery     Decreased strength, endurance, and mobility      Physician: RICHIE Banks NP    Physician Orders: Eval and Treat  Medical Diagnosis: Lumbar radiculopathy  S/P spinal surgery  Surgical Diagnosis: Not applicable for this Episode  Surgical Date: Not applicable for this Episode  Days Since Last Surgery: Not applicable for this Episode    Visit # / Visits Authorized:  1 / 1  Insurance Authorization Period: 6/26/2025 to 6/26/2026  Date of Evaluation: 7/9/2025  Plan of Care Certification: 7/9/2025 to 9/3/25    Time In: 0805   Time Out: 0900  Total Time (in minutes): 55   Total Billable Time (in minutes): 55    Intake Outcome Measure for FOTO Survey    Therapist reviewed FOTO scores for Brandi Manuel on 7/9/2025.   FOTO report - see Media section or FOTO account episode details.     Intake Score: 36%    Precautions:       Subjective   History of Present Illness  Brandi is a 79 y.o. female                  History of Present Condition/Illness: Brandi c/o of LBP for a longtime. She has an injection planned for 7/14/25.  She has had sx 4 times already, and she does not want to have another sx. The pain is in the center of lumbar regional and does not radiate. Pain is worst with cleaning the house (sweeping/moping); she has pain with activities and exercises. She is a caregiver for her ; she does a lot of house chores. She does have some pain in the knees and ankles. Bending fwd is painful, squatting down on lower surfaces, kneeling; going up and down stairs (uses a lift chair sometimes).     Activities of Daily Living  Previously independent with activities of daily living? Yes     Currently independent with activities of daily living? Yes      Independent with pain and increased time.    Previously independent with instrumental activities of daily living? Yes     Currently independent with instrumental activities of daily living? Yes     Independent with pain and increased time.        Pain     Patient reports a current pain level of 6/10. Pain at best is reported as 5/10. Pain at worst is reported as 9/10.   Clinical Progression (since onset): Unchanged  Pain Qualities: Burning, Sharp  Pain-Relieving Factors: Lying down, Medications - prescription  Pain-Aggravating Factors: Bending, Kneeling, Cooking, Exercise, Lifting, Sitting, Squatting, Standing, Stair climbing, Twisting, Walking, Transfers         Review of Systems  Patient reports: Cardiac History        Treatment History  Treatments  Previously Received Treatments: Yes  Previous Treatments: Physical therapy  Currently Receiving Treatments: Yes    Living Arrangements  Living Situation  Living Arrangements: Spouse/significant other, Other (Comment)  Other Living Arrangements Comment: Takes care of her illl .        Employment  Employment Status: Retired   Retired since 2007.       Past Medical History/Physical Systems Review:   Brandi Manuel  has no past medical history on file.    Brandi Manuel  has a past surgical history that includes Tonsillectomy; Hysterectomy; Shoulder surgery; Breast biopsy; and Transforaminal epidural injection of steroid (N/A, 1/29/2025).    Brandi has a current medication list which includes the following prescription(s): acetaminophen, amlodipine, bimatoprost, ciclopirox, cyclobenzaprine, diclofenac sodium, gabapentin, lidocaine, meclizine, mirtazapine, naproxen, ondansetron, suvorexant, triamcinolone acetonide 0.1%, and zolpidem.    Review of patient's allergies indicates:   Allergen Reactions    Codeine     Hydrocodone-acetaminophen Nausea Only and Nausea And Vomiting        Objective   Posture                 Pt displayed fwd head and rounded shoulders;  slouching.     Lower Extremity Sensation  General Lumbar/Lower Extremity Sensation  Intact: Right and Left  Right Lumbar/Lower Extremity Sensation  Intact: Light Touch, Sharp/Dull Discrimination, Static Two Point Discrimination, Dynamic Two Point Discrimination, Kinesthesia, and Proprioception  Right Lumbar/Lower Extremity Sensation Stocking Glove Pattern: No    Left Lumbar/Lower Extremity Sensation  Intact: Light Touch, Static Two Point Discrimination, Dynamic Two Point Discrimination, Sharp/Dull Discrimination, Kinesthesia, and Proprioception  Left Lumbar/Lower Extremity Sensation Stocking Glove Pattern: No             Right Lower Extremity Reflexes  Patellar, L4: Absent (0)                   Left Lower Extremity Reflexes  Patellar, L4: Absent (0)                        Lumbar Range of Motion   Active (deg) Passive (deg) Pain   Flexion  (%)       Extension  (%)       Right Lateral Flexion  (%)       Right Rotation  (%)       Left Lateral Flexion  (%)       Left Rotation 40 (%)         Pain at end range.                 Hip Strength - Planes of Motion   Right Strength Right Pain Left Strength Left  Pain   Flexion (L2) 4-   4-     Extension           ABduction 4-   4-     ADduction           Internal Rotation 4-   4-     External Rotation 4-   4-         Knee Strength   Right Strength Right Pain Left Strength Left  Pain   Flexion (S2) 5   5     Prone Flexion           Extension (L3) 5   5            Ankle/Foot Strength - Planes of Motion   Right Strength Right Pain Left Strength Left  Pain   Dorsiflexion (L4) 4+   4+     Plantar Flexion (S1) 5   5     Inversion           Eversion           Great Toe Flexion           Great Toe Extension (L5)           Lesser Toes Flexion           Lesser Toes Extension                  Transfers Assessment  Sit to Stand Assistance Details: 30 sec STS at 9 reps unassisted with increasd B Knee pain.  Bathtub/Shower Transfer Assistance Details: 5x STS at 15 sec unassisted      Gait  Analysis  Gait Analysis Details  Alternating and reciprocal; L genu valgum; with toes ER. Stable gait.          Time Entry(in minutes):  PT Evaluation (Low) Time Entry: 40  Therapeutic Activity Time Entry: 15  Therapeutic Exercise Time Entry: 10    Assessment & Plan   Assessment  Brandi presents with a condition of Low complexity.   Presentation of Symptoms: Stable  Will Comorbidities Impact Care: Yes       Functional Limitations: Activity tolerance    Assessment Details: Pt is a 78 y/o female referred to PT with medical dx of Lumbar radiculopathy and s/p spine surgery. Pain is back dominant. Pt will multiple spine procedures/sx. Pt displayed moderate imitation with lumbar spine AROM, LE's strength and gait and mobility. Pt limited with prolonged stationary positioning (sitting, standing and walking); and limited with home chores/house care and daily activities due to LBP. POC will address lumbopelvic flexibility/mobility,stabilization and functional lower quarter strengthening. Pt will benefit from skilled rehabilitation services to address the functional limitations identified, and facilitate return to PLOF capacity.     Plan  From a physical therapy perspective, the patient would benefit from: Skilled Rehab Services    Planned therapy interventions include: Therapeutic exercise, Neuromuscular re-education, Therapeutic activities, Manual therapy, and Gait training.    Planned modalities to include: Cryotherapy (cold pack), Thermotherapy (hot pack), and Electrical stimulation - attended.        Visit Frequency: 2 times Per Week for 8 Weeks.       This plan was discussed with Patient.   Discussion participants: Agreed Upon Plan of Care             The patient's spiritual, cultural, and educational needs were considered, and the patient is agreeable to the plan of care and goals.           Goals:     Mara Tariq, PT

## 2025-07-10 NOTE — PROGRESS NOTES
Outpatient Rehab    Physical Therapy Evaluation (only)    Patient Name: Brandi Manuel  MRN: [id number]  YOB: 1945  Encounter Date: 7/9/2025    Therapy Diagnosis:   Encounter Diagnoses   Name Primary?    Lumbar radiculopathy Yes    S/P spinal surgery     Decreased strength, endurance, and mobility      Physician: RICHIE Banks NP    Physician Orders: Eval and Treat  Medical Diagnosis: Lumbar radiculopathy  S/P spinal surgery    Visit # / Visits Authorized:  1 / 1  Insurance Authorization Period: 6/26/2025 to 6/26/2026  Date of Evaluation: 7/9/2025  Plan of Care Certification: 7/9/2025 to 9/3/25 (8 weeks)     Time In: 0805   Time Out: 0900  Total Time (in minutes): 55   Total Billable Time (in minutes): 55    Intake Outcome Measure for FOTO Survey    Therapist reviewed FOTO scores for Brandi Manuel on 7/9/2025.   FOTO report - see Media section or FOTO account episode details.     Intake Score: 36%    Precautions: Standard.    Subjective   History of Present Illness  Brandi is a 79 y.o. female      History of Present Condition/Illness: Brandi c/o of LBP for a longtime. She has an injection planned for 7/14/25.  She has had sx 4 times already, and she does not want to have another sx. The pain is in the center of lumbar regional and does not radiate. Pain is worst with cleaning the house (sweeping/moping); she has pain with activities and exercises. She is a caregiver for her ; she does a lot of house chores. She does have some pain in the knees and ankles. Bending fwd is painful, squatting down on lower surfaces, kneeling; going up and down stairs (uses a lift chair sometimes).     Activities of Daily Living  Previously independent with activities of daily living? Yes     Currently independent with activities of daily living? Yes     Independent with pain and increased time.    Previously independent with instrumental activities of daily living? Yes     Currently independent with  instrumental activities of daily living? Yes     Independent with pain and increased time.        Pain     Patient reports a current pain level of 6/10. Pain at best is reported as 5/10. Pain at worst is reported as 9/10.   Clinical Progression (since onset): Unchanged  Pain Qualities: Burning, Sharp  Pain-Relieving Factors: Lying down, Medications - prescription  Pain-Aggravating Factors: Bending, Kneeling, Cooking, Exercise, Lifting, Sitting, Squatting, Standing, Stair climbing, Twisting, Walking, Transfers         Review of Systems  Patient reports: Cardiac History        Treatment History  Treatments  Previously Received Treatments: Yes  Previous Treatments: Physical therapy  Currently Receiving Treatments: Yes    Living Arrangements  Living Situation  Living Arrangements: Spouse/significant other, Other (Comment)  Other Living Arrangements Comment: Takes care of her illl .        Employment  Employment Status: Retired   Retired since 2007.       Past Medical History/Physical Systems Review:   Brandi Manuel  has no past medical history on file.    Brandi Manuel  has a past surgical history that includes Tonsillectomy; Hysterectomy; Shoulder surgery; Breast biopsy; and Transforaminal epidural injection of steroid (N/A, 1/29/2025).    Brandi has a current medication list which includes the following prescription(s): acetaminophen, amlodipine, bimatoprost, ciclopirox, cyclobenzaprine, diclofenac sodium, gabapentin, lidocaine, meclizine, mirtazapine, naproxen, ondansetron, suvorexant, triamcinolone acetonide 0.1%, and zolpidem.    Review of patient's allergies indicates:   Allergen Reactions    Codeine     Hydrocodone-acetaminophen Nausea Only and Nausea And Vomiting        Objective   Posture                 Pt displayed fwd head and rounded shoulders; slouching.     Lower Extremity Sensation  General Lumbar/Lower Extremity Sensation  Intact: Right and Left  Right Lumbar/Lower Extremity Sensation  Intact:  Light Touch, Sharp/Dull Discrimination, Static Two Point Discrimination, Dynamic Two Point Discrimination, Kinesthesia, and Proprioception  Right Lumbar/Lower Extremity Sensation Stocking Glove Pattern: No    Left Lumbar/Lower Extremity Sensation  Intact: Light Touch, Static Two Point Discrimination, Dynamic Two Point Discrimination, Sharp/Dull Discrimination, Kinesthesia, and Proprioception  Left Lumbar/Lower Extremity Sensation Stocking Glove Pattern: No       Right Lower Extremity Reflexes  Patellar, L4: Absent (0)             Left Lower Extremity Reflexes  Patellar, L4: Absent (0)              Lumbar Range of Motion   Active   (% available) Pain   Flexion 50 Yes   Extension 50 Yes   Right Lateral Flexion 40     Right Rotation 40     Left Lateral Flexion 40     Left Rotation 40        Pain at end range.    Hip Strength - Planes of Motion   Right Strength Left Strength   Flexion (L2) 4- 4-   Extension  DNT  DNT   ABduction 4- 4-   ADduction  DNT  DNT   Internal Rotation 4- 4-   External Rotation 4- 4-       Knee Strength   Right Strength Left Strength   Flexion (S2) 5 5   Prone Flexion       Extension (L3) 5 5          Ankle/Foot Strength - Planes of Motion   Right Strength Left Strength   Dorsiflexion (L4) 4+ 4+   Plantar Flexion (S1) 5 5          Transfers Assessment  Sit to Stand Assistance Details: 30 sec STS at 9 reps unassisted with increasd B Knee pain.  Bathtub/Shower Transfer Assistance Details: 5x STS at 15 sec unassisted      Gait Analysis  Gait Analysis Details  Alternating and reciprocal; L genu valgum; with toes ER. Stable gait.          Time Entry(in minutes):  PT Evaluation (Low) Time Entry: 40  Therapeutic Activity Time Entry: 15  Therapeutic Exercise Time Entry: 10    Assessment & Plan   Assessment  Brandi presents with a condition of Low complexity.   Presentation of Symptoms: Stable  Will Comorbidities Impact Care: Yes       Functional Limitations: Activity tolerance    Assessment Details: Pt  is a 78 y/o female referred to PT with medical dx of Lumbar radiculopathy and s/p spine surgery. Pain is back dominant. Pt will multiple spine procedures/sx. Pt displayed moderate imitation with lumbar spine AROM, LE's strength and gait and mobility. Pt limited with prolonged stationary positioning (sitting, standing and walking); and limited with home chores/house care and daily activities due to LBP. POC will address lumbopelvic flexibility/mobility,stabilization and functional lower quarter strengthening. Pt will benefit from skilled rehabilitation services to address the functional limitations identified, and facilitate return to PLOF capacity.     Plan  From a physical therapy perspective, the patient would benefit from: Skilled Rehab Services    Planned therapy interventions include: Therapeutic exercise, Neuromuscular re-education, Therapeutic activities, Manual therapy, and Gait training.    Planned modalities to include: Cryotherapy (cold pack), Thermotherapy (hot pack), and Electrical stimulation - attended.        Visit Frequency: 2 times Per Week for 8 Weeks.       This plan was discussed with Patient.   Discussion participants: Agreed Upon Plan of Care             The patient's spiritual, cultural, and educational needs were considered, and the patient is agreeable to the plan of care and goals.           Goals:   Active       LTG       Pt to display improved functional legs strength with 30 sec STS performance to 14 reps or better with minimal to no UE assist.       Start:  07/10/25    Expected End:  09/03/25            Pt will reports improved functional activities tolerance to 80% or better return to PLOF capacity to care for her  and manage home chores.       Start:  07/10/25    Expected End:  09/03/25               STG       Pt to report improved pain symptoms by 2-3 points from initial eval.       Start:  07/10/25    Expected End:  08/06/25            Pt to display independence with initial  HEP program, to facilitate transition to independent management of chronic condition.        Start:  07/10/25    Expected End:  08/06/25                Mara Tariq PT

## 2025-07-14 ENCOUNTER — HOSPITAL ENCOUNTER (OUTPATIENT)
Facility: OTHER | Age: 80
Discharge: HOME | End: 2025-07-14
Attending: ANESTHESIOLOGY | Admitting: ANESTHESIOLOGY
Payer: MEDICARE

## 2025-07-14 VITALS
BODY MASS INDEX: 28.52 KG/M2 | WEIGHT: 155 LBS | HEIGHT: 62 IN | RESPIRATION RATE: 18 BRPM | HEART RATE: 66 BPM | TEMPERATURE: 99 F | OXYGEN SATURATION: 100 % | SYSTOLIC BLOOD PRESSURE: 143 MMHG | DIASTOLIC BLOOD PRESSURE: 66 MMHG

## 2025-07-14 DIAGNOSIS — G89.29 CHRONIC PAIN: ICD-10-CM

## 2025-07-14 DIAGNOSIS — M54.16 LUMBAR RADICULOPATHY: Primary | ICD-10-CM

## 2025-07-14 PROCEDURE — 99152 MOD SED SAME PHYS/QHP 5/>YRS: CPT | Performed by: ANESTHESIOLOGY

## 2025-07-14 PROCEDURE — 99152 MOD SED SAME PHYS/QHP 5/>YRS: CPT | Mod: ,,, | Performed by: ANESTHESIOLOGY

## 2025-07-14 PROCEDURE — 62323 NJX INTERLAMINAR LMBR/SAC: CPT | Mod: ,,, | Performed by: ANESTHESIOLOGY

## 2025-07-14 PROCEDURE — 25500020 PHARM REV CODE 255: Performed by: ANESTHESIOLOGY

## 2025-07-14 PROCEDURE — 62323 NJX INTERLAMINAR LMBR/SAC: CPT | Performed by: ANESTHESIOLOGY

## 2025-07-14 PROCEDURE — 63600175 PHARM REV CODE 636 W HCPCS: Performed by: ANESTHESIOLOGY

## 2025-07-14 RX ORDER — LIDOCAINE HYDROCHLORIDE 20 MG/ML
INJECTION, SOLUTION INFILTRATION; PERINEURAL
Status: DISCONTINUED | OUTPATIENT
Start: 2025-07-14 | End: 2025-07-14 | Stop reason: HOSPADM

## 2025-07-14 RX ORDER — LIDOCAINE HYDROCHLORIDE 10 MG/ML
INJECTION, SOLUTION EPIDURAL; INFILTRATION; INTRACAUDAL; PERINEURAL
Status: DISCONTINUED | OUTPATIENT
Start: 2025-07-14 | End: 2025-07-14 | Stop reason: HOSPADM

## 2025-07-14 RX ORDER — MIDAZOLAM HYDROCHLORIDE 1 MG/ML
INJECTION INTRAMUSCULAR; INTRAVENOUS
Status: DISCONTINUED | OUTPATIENT
Start: 2025-07-14 | End: 2025-07-14 | Stop reason: HOSPADM

## 2025-07-14 RX ORDER — FENTANYL CITRATE 50 UG/ML
INJECTION, SOLUTION INTRAMUSCULAR; INTRAVENOUS
Status: DISCONTINUED | OUTPATIENT
Start: 2025-07-14 | End: 2025-07-14 | Stop reason: HOSPADM

## 2025-07-14 RX ORDER — DEXAMETHASONE SODIUM PHOSPHATE 10 MG/ML
INJECTION, SOLUTION INTRA-ARTICULAR; INTRALESIONAL; INTRAMUSCULAR; INTRAVENOUS; SOFT TISSUE
Status: DISCONTINUED | OUTPATIENT
Start: 2025-07-14 | End: 2025-07-14 | Stop reason: HOSPADM

## 2025-07-14 RX ORDER — SODIUM CHLORIDE 9 MG/ML
INJECTION, SOLUTION INTRAVENOUS CONTINUOUS
Status: DISCONTINUED | OUTPATIENT
Start: 2025-07-14 | End: 2025-07-14 | Stop reason: HOSPADM

## 2025-07-14 NOTE — OP NOTE
Lumbar Interlaminar Epidural Steroid Injection under Fluoroscopic Guidance    The procedure, risks, benefits, and options were discussed with the patient. There are no contraindications to the procedure. The patent expressed understanding and agreed to the procedure. Informed written consent was obtained prior to the start of the procedure and can be found in the patient's chart.    PATIENT NAME: Brandi Manuel   MRN: [id number]     DATE OF PROCEDURE: 07/14/2025    PROCEDURE: Lumbar Interlaminar Epidural Steroid Injection L2/L3 under Fluoroscopic Guidance    PRE-OP DIAGNOSIS: Lumbar radiculopathy [M54.16] Lumbar radiculopathy [M54.16]      POST-OP DIAGNOSIS: Same    PHYSICIAN: Radha Noel MD    ASSISTANTS: Sera Bishop MD     MEDICATIONS INJECTED: Preservative-free Decadron 10mg with 4cc of Lidocaine 1% MPF and preservative free normal saline    LOCAL ANESTHETIC INJECTED: Xylocaine 2%     SEDATION: Versed 0.5mg and Fentanyl 25mcg                                                                                                                                                                                     Conscious sedation ordered by M.D. Patient re-evaluation prior to administration of conscious sedation. No changes noted in patient's status from initial evaluation. The patient's vital signs were monitored by RN and patient remained hemodynamically stable throughout the procedure.    Event Time In   Sedation Start 1223   Sedation End 1245       ESTIMATED BLOOD LOSS: None    COMPLICATIONS: None    TECHNIQUE: Time-out was performed to identify the patient and procedure to be performed. With the patient laying in a prone position, the surgical area was prepped and draped in the usual sterile fashion using ChloraPrep and a fenestrated drape. The level was determined under fluoroscopy guidance. Skin anesthesia was achieved by injecting Lidocaine 2% over the injection site. The interlaminar space was then approached  with a 20 gauge,  3.5 inch Tuohy needle that was introduced under fluoroscopic guidance in the AP, lateral and/or contralateral oblique imaging. Once the Ligamentum flavum was encountered loss of resistance to air was used to enter the epidural space. With positive loss of resistance and negative aspiration for CSF or Blood, contrast dye Omnipaque (300mg/mL) was injected to confirm placement and there was no vascular runoff. 4 mL of the medication mixture listed above was injected slowly. Displacement of the radio opaque contrast after injection of the medication confirmed that the medication went into the area of the epidural space. The needles were removed and bleeding was nil. A sterile dressing was applied. No specimens collected. The patient tolerated the procedure well.     The patient was monitored after the procedure in the recovery area. They were given post-procedure and discharge instructions to follow at home. The patient was discharged in a stable condition.    Sera Bishop MD    I reviewed and edited the fellow's note. I conducted my own interview and physical examination. I agree with the findings. I was present and supervising all critical portions of the procedure.

## 2025-07-14 NOTE — H&P
"HPI  Patient presenting for Procedure(s) (LRB):  LUMBAR L2/3 IL XIANG DIRECT REFERRAL (N/A)     Patient on Anti-coagulation No    No health changes since previous encounter. Patient is a direct referral.    History reviewed. No pertinent past medical history.  Past Surgical History:   Procedure Laterality Date    BREAST BIOPSY      HYSTERECTOMY      SHOULDER SURGERY      TONSILLECTOMY      TRANSFORAMINAL EPIDURAL INJECTION OF STEROID N/A 1/29/2025    Procedure: LUMBAR L2/3 XIANG DIRECT REFERRAL;  Surgeon: Radha Noel MD;  Location: [facility];  Service: Pain Management;  Laterality: N/A;     Review of patient's allergies indicates:   Allergen Reactions    Codeine     Hydrocodone-acetaminophen Nausea Only and Nausea And Vomiting      Current Facility-Administered Medications   Medication    0.9% NaCl infusion       PMHx, PSHx, Allergies, Medications reviewed in epic    ROS negative except pain complaints in HPI    OBJECTIVE:    BP (!) 145/67 (BP Location: Right arm, Patient Position: Lying)   Pulse 73   Temp 98.7 °F (37.1 °C) (Oral)   Resp 16   Ht 5' 2" (1.575 m)   Wt 70.3 kg (155 lb)   SpO2 95%   Breastfeeding No   BMI 28.35 kg/m²     PHYSICAL EXAMINATION:    GENERAL: Well appearing, in no acute distress, alert and oriented x3.  PSYCH:  Mood and affect appropriate.  SKIN: Skin color, texture, turgor normal, no rashes or lesions which will impact the procedure.  CV: RRR with palpation of the radial artery.  PULM: No evidence of respiratory difficulty, symmetric chest rise. Clear to auscultation.  NEURO: Cranial nerves grossly intact.    Plan:    Proceed with procedure as planned Procedure(s) (LRB):  LUMBAR L2/3 IL XIANG DIRECT REFERRAL (N/A)    Sera Bishop  07/14/2025        " Incoming fax from anticoagulation     Gave to Kori Hadley

## 2025-07-14 NOTE — DISCHARGE SUMMARY
Discharge Note  Short Stay      SUMMARY     Admit Date: 7/14/2025    Attending Physician: Dr. Noel      Discharge Physician: Dr. Noel      Discharge Date: 7/14/2025 12:27 PM    Procedure(s) (LRB):  LUMBAR L2/3 IL XIANG DIRECT REFERRAL (N/A)    Final Diagnosis: Lumbar radiculopathy [M54.16]    Disposition: Home or self care    Patient Instructions:   Current Discharge Medication List        CONTINUE these medications which have NOT CHANGED    Details   acetaminophen (TYLENOL) 650 MG TbSR Take 1,300 mg by mouth once daily.      amLODIPine (NORVASC) 5 MG tablet Take 1 tablet (5 mg total) by mouth once daily. High blood pressure  Qty: 90 tablet, Refills: 1    Comments: .  Associated Diagnoses: Essential hypertension      bimatoprost (LATISSE) 0.03 % ophthalmic solution Place one drop on applicator and apply evenly along the skin of the upper eyelid at base of eyelashes once daily at bedtime; repeat procedure for second eye (use a clean applicator).  Qty: 5 mL, Refills: 6    Associated Diagnoses: Encounter for cosmetic procedure      ciclopirox (PENLAC) 8 % Soln Apply topically nightly.  Qty: 6.6 mL, Refills: 11      cyclobenzaprine (FLEXERIL) 10 MG tablet Take 1 tablet (10 mg total) by mouth every evening.  Qty: 90 tablet, Refills: 1    Associated Diagnoses: Lumbar radiculopathy; S/P spinal surgery      diclofenac sodium 2 % SoPk Apply 1 Pump topically every 12 (twelve) hours as needed (pain).  Qty: 1 g, Refills: 6    Associated Diagnoses: Lumbar radiculopathy; S/P spinal surgery      gabapentin (NEURONTIN) 100 MG capsule Take 1-3 capsules (100-300 mg total) by mouth every evening.  Qty: 90 capsule, Refills: 11    Associated Diagnoses: S/P spinal surgery; Lumbar radiculopathy      LIDOcaine (LIDODERM) 5 % Place 1 patch onto the skin daily as needed. Remove & Discard patch within 12 hours or as directed by MD  Qty: 5 patch, Refills: 0    Associated Diagnoses: Acute left-sided low back pain, unspecified whether sciatica  present; Dorsalgia of lumbar region      meclizine (ANTIVERT) 25 mg tablet Take 1 tablet (25 mg total) by mouth 2 (two) times daily as needed for Dizziness.  Qty: 90 tablet, Refills: 2    Associated Diagnoses: Benign paroxysmal vertigo, bilateral      mirtazapine (REMERON) 7.5 MG Tab Take 7.5 mg by mouth nightly as needed.      naproxen (NAPROSYN) 500 MG tablet Take 1 tablet (500 mg total) by mouth 2 (two) times daily as needed (pain).  Qty: 90 tablet, Refills: 1    Associated Diagnoses: Lumbar radiculopathy; S/P spinal surgery      ondansetron (ZOFRAN-ODT) 4 MG TbDL Take 1 tablet (4 mg total) by mouth every 8 (eight) hours as needed (nausea / vomiting).  Qty: 20 tablet, Refills: 0    Associated Diagnoses: Nausea and vomiting, unspecified vomiting type      suvorexant (BELSOMRA) 20 mg Tab Take 20 mg by mouth every evening.  Qty: 30 tablet, Refills: 5    Associated Diagnoses: Insomnia, unspecified type      triamcinolone acetonide 0.1% (KENALOG) 0.1 % ointment Apply topically 3 (three) times daily.  Qty: 453.6 g, Refills: 3    Associated Diagnoses: Rash and nonspecific skin eruption      zolpidem (AMBIEN CR) 12.5 MG CR tablet Take 12.5 mg by mouth.                 Discharge Diagnosis: Lumbar radiculopathy [M54.16]  Condition on Discharge: Stable with no complications to procedure   Diet on Discharge: Same as before.  Activity: as per instruction sheet.  Discharge to: Home with a responsible adult.  Follow up: 2-4 weeks       Please call my office or pager at [phone] if experienced any weakness or loss of sensation, fever > 101.5, pain uncontrolled with oral medications, persistent nausea/vomiting/or diarrhea, redness or drainage from the incisions, or any other worrisome concerns. If physician on call was not reached or could not communicate with our office for any reason please go to the nearest emergency department

## 2025-07-14 NOTE — DISCHARGE INSTRUCTIONS

## 2025-07-16 ENCOUNTER — OFFICE VISIT (OUTPATIENT)
Dept: ORTHOPEDICS | Facility: CLINIC | Age: 80
End: 2025-07-16
Payer: MEDICARE

## 2025-07-16 VITALS — BODY MASS INDEX: 29.15 KG/M2 | HEIGHT: 62 IN | WEIGHT: 158.38 LBS

## 2025-07-16 DIAGNOSIS — M47.816 LUMBAR SPONDYLOSIS: Primary | ICD-10-CM

## 2025-07-16 DIAGNOSIS — Z98.1 HISTORY OF LUMBAR FUSION: ICD-10-CM

## 2025-07-16 DIAGNOSIS — M51.360 DEGENERATION OF INTERVERTEBRAL DISC OF LUMBAR REGION WITH DISCOGENIC BACK PAIN: ICD-10-CM

## 2025-07-16 PROCEDURE — 99214 OFFICE O/P EST MOD 30 MIN: CPT | Mod: S$PBB,,, | Performed by: ORTHOPAEDIC SURGERY

## 2025-07-16 PROCEDURE — 99999 PR PBB SHADOW E&M-EST. PATIENT-LVL III: CPT | Mod: PBBFAC,,, | Performed by: ORTHOPAEDIC SURGERY

## 2025-07-16 PROCEDURE — 99213 OFFICE O/P EST LOW 20 MIN: CPT | Mod: PBBFAC | Performed by: ORTHOPAEDIC SURGERY

## 2025-07-17 ENCOUNTER — CLINICAL SUPPORT (OUTPATIENT)
Dept: REHABILITATION | Facility: HOSPITAL | Age: 80
End: 2025-07-17
Payer: MEDICARE

## 2025-07-17 DIAGNOSIS — M54.16 LUMBAR RADICULOPATHY: Primary | ICD-10-CM

## 2025-07-17 DIAGNOSIS — Z74.09 DECREASED STRENGTH, ENDURANCE, AND MOBILITY: ICD-10-CM

## 2025-07-17 DIAGNOSIS — R68.89 DECREASED STRENGTH, ENDURANCE, AND MOBILITY: ICD-10-CM

## 2025-07-17 DIAGNOSIS — R53.1 DECREASED STRENGTH, ENDURANCE, AND MOBILITY: ICD-10-CM

## 2025-07-17 DIAGNOSIS — Z98.890 S/P SPINAL SURGERY: ICD-10-CM

## 2025-07-17 PROCEDURE — 97110 THERAPEUTIC EXERCISES: CPT | Mod: PN

## 2025-07-17 NOTE — PROGRESS NOTES
DATE: 7/17/2025  PATIENT: Brandi Manuel    Attending Physician: Keith Morrissey M.D.    CHIEF COMPLAINT: LBP    HISTORY:  Brandi Manuel is a 79 y.o. female w/ a hx of lumbar discectomy by Dr Kathleen in 2015, TLIF L4-5 with Dr murphy in 2018 and then 2 months later a revision for bone graft compression the nerve was completed, which was complicated by an infection (gram negative bacilli per notes) for which she was treated with 6 weeks of IV abx presents for initial evaluation of low back pain (Back - 3). The pain has been present for years. The patient describes the pain as dull but it does not go down legs.  The pain is worse with activity and improved by rest. There is no associated numbness and tingling. There is no subjective weakness. Prior treatments have included OTC meds, PT, and XIANG, but no recent surgery.    The Patient denies myelopathic symptoms such as handwriting changes or difficulty with buttons/coins/keys. Denies perineal paresthesias, bowel/bladder dysfunction.    The patient does not smoke, have DM or endorse IVDU. The patient is not on any blood thinners and does not take chronic narcotics. She is retired from [employer] in [email or web address].    PAST MEDICAL/SURGICAL HISTORY:  History reviewed. No pertinent past medical history.  Past Surgical History:   Procedure Laterality Date    BREAST BIOPSY      HYSTERECTOMY      INJECTION, SPINE, LUMBOSACRAL, TRANSFORAMINAL APPROACH N/A 7/14/2025    Procedure: LUMBAR L2/3 IL XIANG DIRECT REFERRAL;  Surgeon: Radha Noel MD;  Location: [facility];  Service: Pain Management;  Laterality: N/A;    SHOULDER SURGERY      TONSILLECTOMY      TRANSFORAMINAL EPIDURAL INJECTION OF STEROID N/A 1/29/2025    Procedure: LUMBAR L2/3 XIANG DIRECT REFERRAL;  Surgeon: Radha Noel MD;  Location: [facility];  Service: Pain Management;  Laterality: N/A;       Current Medications: Current Medications[1]    Social History: Social History[2]    REVIEW OF SYSTEMS:  Constitution:  "Negative. Negative for chills, fever and night sweats.   Cardiovascular: Negative for chest pain and syncope.   Respiratory: Negative for cough and shortness of breath.   Gastrointestinal: See HPI. Negative for nausea/vomiting. Negative for abdominal pain.  Genitourinary: See HPI. Negative for discoloration or dysuria.  Skin: Negative for dry skin, itching and rash.   Hematologic/Lymphatic: negative for bleeding/clotting disorders.   Musculoskeletal: Negative for falls and muscle weakness.   Neurological: See HPI. no history of seizures. no history of cranial surgery or shunts.  Endocrine: Negative for polydipsia, polyphagia and polyuria.   Allergic/Immunologic: Negative for hives and persistent infections.    PHYSICAL EXAMINATION:    Ht 5' 2" (1.575 m)   Wt 71.9 kg (158 lb 6.4 oz)   BMI 28.97 kg/m²     General: The patient is a 79 y.o. female in no apparent distress, the patient is orientatied to person, place and time.   Psych: Normal mood and affect  HEENT: Vision grossly intact, hearing intact to the spoken word.  Lungs: Respirations unlabored.  Gait: Normal station and gait, no difficulty with toe or heel walk.   Skin: Dorsal lumbar skin negative for rashes, lesions, hairy patches. Lower lumbar scar.  Range of motion: Lumbar range of motion is acceptable. There is lower lumbar tenderness to palpation.  Spinal Balance: Global saggital and coronal spinal balance acceptable, no significant for scoliosis and kyphosis.  Musculoskeletal: No pain with the range of motion of the bilateral hips. No trochanteric tenderness to palpation.  Vascular: Bilateral lower extremities warm and well perfused, Dorsalis pedis pulses 2+ bilaterally.  Neurological: Normal strength and tone in all major motor groups in the bilateral lower extremities. Normal sensation to light touch in the L2-S1 dermatomes bilaterally.  Deep tendon reflexes symmetric 2+ in the bilateral lower extremities.  Negative Babinski bilaterally.    IMAGING: "   Today I independently reviewed the following images and my interpretations are as follows:    AP, Lat and Flex/Ex upright L-spine films demonstrate spondylosis and DDD. L4-5 TLIF. Lr screws short and in cortical trajectory.    Lumbar CT showed osseous fusion L4-5.    Lumbar MRI showed L3-4 mod central stenosis.     Body mass index is 28.97 kg/m².  Hemoglobin A1C   Date Value Ref Range Status   08/21/2023 5.7 (H) 4.0 - 5.6 % Final     Comment:     ADA Screening Guidelines:  5.7-6.4%  Consistent with prediabetes  >or=6.5%  Consistent with diabetes    High levels of fetal hemoglobin interfere with the HbA1C  assay. Heterozygous hemoglobin variants (HbS, HgC, etc)do  not significantly interfere with this assay.   However, presence of multiple variants may affect accuracy.     06/09/2022 5.7 (H) 4.0 - 5.6 % Final     Comment:     ADA Screening Guidelines:  5.7-6.4%  Consistent with prediabetes  >or=6.5%  Consistent with diabetes    High levels of fetal hemoglobin interfere with the HbA1C  assay. Heterozygous hemoglobin variants (HbS, HgC, etc)do  not significantly interfere with this assay.   However, presence of multiple variants may affect accuracy.     10/12/2020 5.8 (H) 4.0 - 5.6 % Final     Comment:     ADA Screening Guidelines:  5.7-6.4%  Consistent with prediabetes  >or=6.5%  Consistent with diabetes  High levels of fetal hemoglobin interfere with the HbA1C  assay. Heterozygous hemoglobin variants (HbS, HgC, etc)do  not significantly interfere with this assay.   However, presence of multiple variants may affect accuracy.           ASSESSMENT/PLAN:    Brandi was seen today for pain.    Diagnoses and all orders for this visit:    Lumbar spondylosis    History of lumbar fusion    Degeneration of intervertebral disc of lumbar region with discogenic back pain      Follow up if symptoms worsen or fail to improve.    Patient has lumbar spondylosis and stenosis, in the setting of multiple prior lumbar surgeries. She has  no leg pain; no ortho spine surgical intervention warranted. I discussed the natural history of their diagnoses as well as surgical and nonsurgical treatment options. I educated the patient on the importance of core/back strengthening, correct posture, bending/lifting ergonomics, and low-impact aerobic exercises (walking, elliptical, and aquatherapy). Continue medications and exercises. Patient will follow up PRN.    Keith Morrissey MD  Orthopaedic Spine Surgeon  Department of Orthopaedic Surgery  [phone]         [1]   Current Outpatient Medications:     acetaminophen (TYLENOL) 650 MG TbSR, Take 1,300 mg by mouth once daily., Disp: , Rfl:     amLODIPine (NORVASC) 5 MG tablet, Take 1 tablet (5 mg total) by mouth once daily. High blood pressure, Disp: 90 tablet, Rfl: 1    bimatoprost (LATISSE) 0.03 % ophthalmic solution, Place one drop on applicator and apply evenly along the skin of the upper eyelid at base of eyelashes once daily at bedtime; repeat procedure for second eye (use a clean applicator)., Disp: 5 mL, Rfl: 6    ciclopirox (PENLAC) 8 % Soln, Apply topically nightly., Disp: 6.6 mL, Rfl: 11    cyclobenzaprine (FLEXERIL) 10 MG tablet, Take 1 tablet (10 mg total) by mouth every evening., Disp: 90 tablet, Rfl: 1    diclofenac sodium 2 % SoPk, Apply 1 Pump topically every 12 (twelve) hours as needed (pain)., Disp: 1 g, Rfl: 6    gabapentin (NEURONTIN) 100 MG capsule, Take 1-3 capsules (100-300 mg total) by mouth every evening., Disp: 90 capsule, Rfl: 11    LIDOcaine (LIDODERM) 5 %, Place 1 patch onto the skin daily as needed. Remove & Discard patch within 12 hours or as directed by MD, Disp: 5 patch, Rfl: 0    meclizine (ANTIVERT) 25 mg tablet, Take 1 tablet (25 mg total) by mouth 2 (two) times daily as needed for Dizziness., Disp: 90 tablet, Rfl: 2    mirtazapine (REMERON) 7.5 MG Tab, Take 7.5 mg by mouth nightly as needed., Disp: , Rfl:     naproxen (NAPROSYN) 500 MG tablet, Take 1 tablet (500 mg total) by  mouth 2 (two) times daily as needed (pain)., Disp: 90 tablet, Rfl: 1    ondansetron (ZOFRAN-ODT) 4 MG TbDL, Take 1 tablet (4 mg total) by mouth every 8 (eight) hours as needed (nausea / vomiting)., Disp: 20 tablet, Rfl: 0    suvorexant (BELSOMRA) 20 mg Tab, Take 20 mg by mouth every evening., Disp: 30 tablet, Rfl: 5    triamcinolone acetonide 0.1% (KENALOG) 0.1 % ointment, Apply topically 3 (three) times daily., Disp: 453.6 g, Rfl: 3    zolpidem (AMBIEN CR) 12.5 MG CR tablet, Take 12.5 mg by mouth., Disp: , Rfl:   [2]   Social History  Socioeconomic History    Marital status:    Tobacco Use    Smoking status: Never     Passive exposure: Never    Smokeless tobacco: Never   Substance and Sexual Activity    Alcohol use: No    Drug use: Not Currently    Sexual activity: Not Currently     Social Drivers of Health     Food Insecurity: No Food Insecurity (4/4/2024)    Hunger Vital Sign     Worried About Running Out of Food in the Last Year: Never true     Ran Out of Food in the Last Year: Never true   Transportation Needs: No Transportation Needs (4/4/2024)    PRAPARE - Transportation     Lack of Transportation (Medical): No     Lack of Transportation (Non-Medical): No   Physical Activity: Insufficiently Active (4/4/2024)    Exercise Vital Sign     Days of Exercise per Week: 3 days     Minutes of Exercise per Session: 30 min   Stress: No Stress Concern Present (4/4/2024)    [employer] - Occupational Stress Questionnaire     Feeling of Stress : Only a little   Housing Stability: Low Risk  (4/4/2024)    Housing Stability Vital Sign     Unable to Pay for Housing in the Last Year: No     Number of Places Lived in the Last Year: 1     Unstable Housing in the Last Year: No

## 2025-07-17 NOTE — PROGRESS NOTES
Outpatient Rehab    Physical Therapy Visit    Patient Name: Brandi Manuel  MRN: [id number]  YOB: 1945  Encounter Date: 7/17/2025    Therapy Diagnosis:   Encounter Diagnoses   Name Primary?    Lumbar radiculopathy Yes    S/P spinal surgery     Decreased strength, endurance, and mobility      Physician: RICHIE Banks NP    Physician Orders: Eval and Treat  Medical Diagnosis: Lumbar radiculopathy  S/P spinal surgery  Surgical Diagnosis: Not applicable for this Episode   Surgical Date: Not applicable for this Episode  Days Since Last Surgery: Not applicable for this Episode    Visit # / Visits Authorized:  1 / 12  Insurance Authorization Period: 7/9/2025 to 12/31/2025  Date of Evaluation: 7/9/2025  Plan of Care Certification: 7/9/2025 to 9/3/2025      PT/PTA:     Number of PTA visits since last PT visit:   Time In:     Time Out:    Total Time (in minutes):     Total Billable Time (in minutes):      FOTO:  Intake Score: 36%  Survey Score 2: Not applicable for this Episode%  Survey Score 3: Not applicable for this Episode%    Precautions:         Subjective   Surgeon reports I don't meet conditions to have surgery..  Pain reported as 6/10.      Objective            Treatment:  Therapeutic Exercise  TE 1: LTR - 30 reps  TE 2: hip abd/add - 30reps  TE 3: bike - 10'  TE 4: cervical flexion - 30 reps  TE 5: bridges - 30 reps      Time Entry(in minutes):       Assessment & Plan   Assessment: Patient currently presents to therapy with reports of lumbar pain in which she reports with lumbar back brace as a result to pain. Patient performed therapeutic exercise without bracing to improve core strength and engagement. Patient will continue to benefit from skilled therapy to address deficits.   Evaluation/Treatment Tolerance: Patient tolerated treatment well    The patient will continue to benefit from skilled outpatient physical therapy in order to address the deficits listed in the problem list on the initial  evaluation, provide patient and family education, and maximize the patients level of independence in the home and community environments.     The patient's spiritual, cultural, and educational needs were considered, and the patient is agreeable to the plan of care and goals.           Plan: Continue with current POC    Goals:   Active       LTG       Pt to display improved functional legs strength with 30 sec STS performance to 14 reps or better with minimal to no UE assist. (Progressing)       Start:  07/10/25    Expected End:  09/03/25            Pt will reports improved functional activities tolerance to 80% or better return to PLOF capacity to care for her  and manage home chores. (Progressing)       Start:  07/10/25    Expected End:  09/03/25               STG       Pt to report improved pain symptoms by 2-3 points from initial eval. (Progressing)       Start:  07/10/25    Expected End:  08/06/25            Pt to display independence with initial HEP program, to facilitate transition to independent management of chronic condition.  (Progressing)       Start:  07/10/25    Expected End:  08/06/25                Zoin Roman, PT

## 2025-07-22 ENCOUNTER — CLINICAL SUPPORT (OUTPATIENT)
Dept: REHABILITATION | Facility: HOSPITAL | Age: 80
End: 2025-07-22
Payer: MEDICARE

## 2025-07-22 DIAGNOSIS — R53.1 DECREASED STRENGTH, ENDURANCE, AND MOBILITY: ICD-10-CM

## 2025-07-22 DIAGNOSIS — Z98.890 S/P SPINAL SURGERY: ICD-10-CM

## 2025-07-22 DIAGNOSIS — Z74.09 DECREASED STRENGTH, ENDURANCE, AND MOBILITY: ICD-10-CM

## 2025-07-22 DIAGNOSIS — R68.89 DECREASED STRENGTH, ENDURANCE, AND MOBILITY: ICD-10-CM

## 2025-07-22 DIAGNOSIS — M54.16 LUMBAR RADICULOPATHY: Primary | ICD-10-CM

## 2025-07-22 PROCEDURE — 97110 THERAPEUTIC EXERCISES: CPT | Mod: PN,CQ

## 2025-07-22 NOTE — PROGRESS NOTES
"  Outpatient Rehab    Physical Therapy Visit    Patient Name: Brandi Manuel  MRN: [id number]  YOB: 1945  Encounter Date: 7/22/2025    Therapy Diagnosis:   Encounter Diagnoses   Name Primary?    Lumbar radiculopathy Yes    S/P spinal surgery     Decreased strength, endurance, and mobility      Physician: RICHIE Bnaks NP    Physician Orders: Eval and Treat  Medical Diagnosis: Lumbar radiculopathy  S/P spinal surgery  Surgical Diagnosis: Not applicable for this Episode   Surgical Date: Not applicable for this Episode  Days Since Last Surgery: Not applicable for this Episode    Visit # / Visits Authorized:  2 / 12  Insurance Authorization Period: 7/9/2025 to 12/31/2025  Date of Evaluation: 7/9/2025  Plan of Care Certification: 7/9/2025 to 9/3/2025      PT/PTA: PTA   Number of PTA visits since last PT visit:1  Time In: 0700   Time Out: 0800  Total Time (in minutes): 60   Total Billable Time (in minutes): 60    FOTO:  Intake Score (%): 36  Survey Score 2 (%): Not applicable for this Episode  Survey Score 3 (%): Not applicable for this Episode    Precautions:         Subjective   "I'm in pain today".  Pain reported as 6/10.      Objective            Treatment:  Therapeutic Exercise  TE 1: LTR - 30 reps w/MHP  TE 2: Hip add 3x10 w/ MHP  TE 3: bike - 10'  TE 4: bent knee fall outs BTB 2x10 ea w/ MHP  TE 5: bridges - 30 reps w/ MHP  TE 6: TA activation w' MHP 3x10  TE 7: SKTC w/ MH P 3x10      Time Entry(in minutes):  Therapeutic Exercise Time Entry: 60    Assessment & Plan   Assessment: MHP used with supine exercises to reduce pain and improve tolerance for exercise. Pt had no difficulty w/ exercises today. PTA suggested she try IFC next session for pain management and TENS education at home. Pt will benefit from STM to R lower lumbar region and core strengthening in future sessions.  Evaluation/Treatment Tolerance: Patient tolerated treatment well    The patient will continue to benefit from skilled " outpatient physical therapy in order to address the deficits listed in the problem list on the initial evaluation, provide patient and family education, and maximize the patients level of independence in the home and community environments.     The patient's spiritual, cultural, and educational needs were considered, and the patient is agreeable to the plan of care and goals.           Plan: Continue with current POC    Goals:   Active       LTG       Pt to display improved functional legs strength with 30 sec STS performance to 14 reps or better with minimal to no UE assist. (Progressing)       Start:  07/10/25    Expected End:  09/03/25            Pt will reports improved functional activities tolerance to 80% or better return to PLOF capacity to care for her  and manage home chores. (Progressing)       Start:  07/10/25    Expected End:  09/03/25               STG       Pt to report improved pain symptoms by 2-3 points from initial eval. (Progressing)       Start:  07/10/25    Expected End:  08/06/25            Pt to display independence with initial HEP program, to facilitate transition to independent management of chronic condition.  (Progressing)       Start:  07/10/25    Expected End:  08/06/25                Hoang Baxter, PTA

## 2025-07-24 ENCOUNTER — CLINICAL SUPPORT (OUTPATIENT)
Dept: REHABILITATION | Facility: HOSPITAL | Age: 80
End: 2025-07-24
Payer: MEDICARE

## 2025-07-24 DIAGNOSIS — M54.16 LUMBAR RADICULOPATHY: Primary | ICD-10-CM

## 2025-07-24 DIAGNOSIS — Z74.09 DECREASED STRENGTH, ENDURANCE, AND MOBILITY: ICD-10-CM

## 2025-07-24 DIAGNOSIS — R53.1 DECREASED STRENGTH, ENDURANCE, AND MOBILITY: ICD-10-CM

## 2025-07-24 DIAGNOSIS — R68.89 DECREASED STRENGTH, ENDURANCE, AND MOBILITY: ICD-10-CM

## 2025-07-24 DIAGNOSIS — Z98.890 S/P SPINAL SURGERY: ICD-10-CM

## 2025-07-24 PROCEDURE — 97110 THERAPEUTIC EXERCISES: CPT | Mod: KX,PN

## 2025-07-24 NOTE — PROGRESS NOTES
Outpatient Rehab    Physical Therapy Visit    Patient Name: Brandi Manuel  MRN: [id number]  YOB: 1945  Encounter Date: 7/24/2025    Therapy Diagnosis:   Encounter Diagnoses   Name Primary?    Lumbar radiculopathy Yes    S/P spinal surgery     Decreased strength, endurance, and mobility      Physician: RICHIE Banks NP    Physician Orders: Eval and Treat  Medical Diagnosis: Lumbar radiculopathy  S/P spinal surgery  Surgical Diagnosis: Not applicable for this Episode   Surgical Date: Not applicable for this Episode  Days Since Last Surgery: Not applicable for this Episode    Visit # / Visits Authorized:  3 / 12  Insurance Authorization Period: 7/9/2025 to 12/31/2025  Date of Evaluation: 7/9/2025  Plan of Care Certification: 7/9/2025 to 9/3/2025      PT/PTA: PT   Number of PTA visits since last PT visit:0  Time In: 0700   Time Out: 0800  Total Time (in minutes): 60   Total Billable Time (in minutes): 60    FOTO:  Intake Score (%): 36  Survey Score 2 (%): Not applicable for this Episode  Survey Score 3 (%): Not applicable for this Episode    Precautions:         Subjective   my knees bother me today more than my back; but my back continues to hurt.  Pain reported as 4/10.      Objective            Treatment:  Therapeutic Exercise  TE 1: LTR - 30 reps w/MHP  TE 2: Hip add 3x10 w/ MHP  TE 7: SKTC w/ MH P 3x10      Time Entry(in minutes):  Therapeutic Exercise Time Entry: 60    Assessment & Plan   Assessment: Patient currently presents to therapy with reports of increase knee pain vs lumbar pain however reports a continued 4/10 pain with functional activity. Patient performed supine therapeutic exercise secondary to incrased knee pain. Patient will continue to benefit from skilled therapy to address deficits.   Evaluation/Treatment Tolerance: Patient tolerated treatment well    The patient will continue to benefit from skilled outpatient physical therapy in order to address the deficits listed in the  problem list on the initial evaluation, provide patient and family education, and maximize the patients level of independence in the home and community environments.     The patient's spiritual, cultural, and educational needs were considered, and the patient is agreeable to the plan of care and goals.           Plan: Continue with current POC    Goals:   Active       LTG       Pt to display improved functional legs strength with 30 sec STS performance to 14 reps or better with minimal to no UE assist. (Progressing)       Start:  07/10/25    Expected End:  09/03/25            Pt will reports improved functional activities tolerance to 80% or better return to PLOF capacity to care for her  and manage home chores. (Progressing)       Start:  07/10/25    Expected End:  09/03/25               STG       Pt to report improved pain symptoms by 2-3 points from initial eval. (Progressing)       Start:  07/10/25    Expected End:  08/06/25            Pt to display independence with initial HEP program, to facilitate transition to independent management of chronic condition.  (Progressing)       Start:  07/10/25    Expected End:  08/06/25                Zion Roman, PT

## 2025-07-29 ENCOUNTER — CLINICAL SUPPORT (OUTPATIENT)
Dept: REHABILITATION | Facility: HOSPITAL | Age: 80
End: 2025-07-29
Payer: MEDICARE

## 2025-07-29 DIAGNOSIS — R53.1 DECREASED STRENGTH, ENDURANCE, AND MOBILITY: ICD-10-CM

## 2025-07-29 DIAGNOSIS — M54.16 LUMBAR RADICULOPATHY: Primary | ICD-10-CM

## 2025-07-29 DIAGNOSIS — Z74.09 DECREASED STRENGTH, ENDURANCE, AND MOBILITY: ICD-10-CM

## 2025-07-29 DIAGNOSIS — R68.89 DECREASED STRENGTH, ENDURANCE, AND MOBILITY: ICD-10-CM

## 2025-07-29 DIAGNOSIS — Z98.890 S/P SPINAL SURGERY: ICD-10-CM

## 2025-07-29 PROCEDURE — 97110 THERAPEUTIC EXERCISES: CPT | Mod: KX,PN,CQ

## 2025-07-29 NOTE — PROGRESS NOTES
Outpatient Rehab    Physical Therapy Visit    Patient Name: Brandi Manuel  MRN: [id number]  YOB: 1945  Encounter Date: 7/29/2025    Therapy Diagnosis:   Encounter Diagnoses   Name Primary?    Lumbar radiculopathy Yes    S/P spinal surgery     Decreased strength, endurance, and mobility      Physician: RICHIE Banks NP    Physician Orders: Eval and Treat  Medical Diagnosis: Lumbar radiculopathy  S/P spinal surgery  Surgical Diagnosis: Not applicable for this Episode   Surgical Date: Not applicable for this Episode  Days Since Last Surgery: Not applicable for this Episode    Visit # / Visits Authorized:  4 / 12  Insurance Authorization Period: 7/9/2025 to 12/31/2025  Date of Evaluation: 7/9/2025  Plan of Care Certification: 7/9/2025 to 9/3/2025      PT/PTA: PTA   Number of PTA visits since last PT visit:1  Time In: 0800   Time Out: 0900  Total Time (in minutes): 60   Total Billable Time (in minutes): 60    FOTO:  Intake Score (%): 36  Survey Score 2 (%): Not applicable for this Episode  Survey Score 3 (%): Not applicable for this Episode    Precautions:         Subjective   Back pain still present, TENS unit helps at home..  Pain reported as 5/10.      Objective            Treatment:  Therapeutic Exercise  TE 1: LTR - 30 reps w/MHP  TE 2: Hip add 3x10 w/ MHP  TE 3: bike - 10'  TE 4: bent knee fall outs BTB 2x10 ea w/ MHP  TE 5: bridges - 30 reps w/ MHP  TE 6: TA activation w' MHP 3x10  TE 7: SKTC w/ MH P 3x10  TE 8: SLR 3x10 #2      Time Entry(in minutes):  Therapeutic Exercise Time Entry: 60    Assessment & Plan   Assessment: Pt demonstrated good tolerance for exercise today. Weight added to Straight Leg Raise to challenge pt. Bridges progressed w/ GTB to strengthen hip abductors. Brandi will continue to benefit from skilled therapy.   Evaluation/Treatment Tolerance: Patient tolerated treatment well    The patient will continue to benefit from skilled outpatient physical therapy in order to address  the deficits listed in the problem list on the initial evaluation, provide patient and family education, and maximize the patients level of independence in the home and community environments.     The patient's spiritual, cultural, and educational needs were considered, and the patient is agreeable to the plan of care and goals.           Plan: Continue with current POC    Goals:   Active       LTG       Pt to display improved functional legs strength with 30 sec STS performance to 14 reps or better with minimal to no UE assist. (Progressing)       Start:  07/10/25    Expected End:  09/03/25            Pt will reports improved functional activities tolerance to 80% or better return to PLOF capacity to care for her  and manage home chores. (Progressing)       Start:  07/10/25    Expected End:  09/03/25               STG       Pt to report improved pain symptoms by 2-3 points from initial eval. (Progressing)       Start:  07/10/25    Expected End:  08/06/25            Pt to display independence with initial HEP program, to facilitate transition to independent management of chronic condition.  (Progressing)       Start:  07/10/25    Expected End:  08/06/25                Hoang Baxter, PTA

## 2025-07-31 ENCOUNTER — CLINICAL SUPPORT (OUTPATIENT)
Dept: REHABILITATION | Facility: HOSPITAL | Age: 80
End: 2025-07-31
Payer: MEDICARE

## 2025-07-31 DIAGNOSIS — R53.1 DECREASED STRENGTH, ENDURANCE, AND MOBILITY: ICD-10-CM

## 2025-07-31 DIAGNOSIS — Z98.890 S/P SPINAL SURGERY: ICD-10-CM

## 2025-07-31 DIAGNOSIS — R68.89 DECREASED STRENGTH, ENDURANCE, AND MOBILITY: ICD-10-CM

## 2025-07-31 DIAGNOSIS — Z74.09 DECREASED STRENGTH, ENDURANCE, AND MOBILITY: ICD-10-CM

## 2025-07-31 DIAGNOSIS — M54.16 LUMBAR RADICULOPATHY: Primary | ICD-10-CM

## 2025-07-31 PROCEDURE — 97110 THERAPEUTIC EXERCISES: CPT | Mod: KX,PN

## 2025-07-31 NOTE — PROGRESS NOTES
"  Outpatient Rehab    Physical Therapy Progress Note    Patient Name: Brandi Manuel  MRN: [id number]  YOB: 1945  Encounter Date: 7/31/2025    Therapy Diagnosis:   Encounter Diagnoses   Name Primary?    Lumbar radiculopathy Yes    S/P spinal surgery     Decreased strength, endurance, and mobility      Physician: RICHIE Banks NP    Physician Orders: Eval and Treat  Medical Diagnosis: Lumbar radiculopathy  S/P spinal surgery  Surgical Diagnosis: Not applicable for this Episode   Surgical Date: Not applicable for this Episode  Days Since Last Surgery: Not applicable for this Episode    Visit # / Visits Authorized:  5 / 12  Insurance Authorization Period: 7/9/2025 to 12/31/2025  Date of Evaluation: 7/9/2025  Plan of Care Certification: 7/9/2025 to 9/3/2025      PT/PTA:     Number of PTA visits since last PT visit:   Time In: 0800   Time Out: 0900  Total Time (in minutes): 60   Total Billable Time (in minutes): 30    FOTO:  Intake Score (%): 36  Survey Score 2 (%): Not applicable for this Episode  Survey Score 3 (%): Not applicable for this Episode    Precautions:       Subjective   I am alway in pain.  Pain reported as 4/10.      Objective      Lumbar Range of Motion   Active (deg) Passive (deg) Pain   Flexion 40       Extension 45       Right Lateral Flexion 40       Right Rotation 35       Left Lateral Flexion 40       Left Rotation 35                        Treatment:  Therapeutic Exercise  TE 1: LTR - 30 reps w/MHP  TE 3: bike - 10'  TE 5: bridges - 30 reps w/ MHP  TE 7: SKTC w/ MH P 3x10  TE 8: SLR 3x10 #2  TE 9: latt stretch - 3x30  TE 10: shuttle - LS 25# DL 37# - 30 reps      Time Entry(in minutes):  Therapeutic Exercise Time Entry: 30    Assessment & Plan   Assessment: Patient reports to therapy with continued pain; Patient reports " I am always in pain" however Patient reports an additional pain more superior in the thoracic spine. Patient tolerated today's treatment session with an increase " in resistance therapeutic exercise performed . Patient will continue to benefit from skilled therapy to address deficits.   Evaluation/Treatment Tolerance: Patient tolerated treatment well    The patient will continue to benefit from skilled outpatient physical therapy in order to address the deficits listed in the problem list on the initial evaluation, provide patient and family education, and maximize the patients level of independence in the home and community environments.     The patient's spiritual, cultural, and educational needs were considered, and the patient is agreeable to the plan of care and goals.           Plan: Continue with current POC    Goals:   Active       LTG       Pt to display improved functional legs strength with 30 sec STS performance to 14 reps or better with minimal to no UE assist. (Progressing)       Start:  07/10/25    Expected End:  09/03/25            Pt will reports improved functional activities tolerance to 80% or better return to PLOF capacity to care for her  and manage home chores. (Progressing)       Start:  07/10/25    Expected End:  09/03/25               STG       Pt to report improved pain symptoms by 2-3 points from initial eval. (Progressing)       Start:  07/10/25    Expected End:  08/06/25            Pt to display independence with initial HEP program, to facilitate transition to independent management of chronic condition.  (Progressing)       Start:  07/10/25    Expected End:  08/06/25                Zion Roman, PT

## 2025-08-04 ENCOUNTER — TELEPHONE (OUTPATIENT)
Dept: OBSTETRICS AND GYNECOLOGY | Facility: CLINIC | Age: 80
End: 2025-08-04
Payer: MEDICARE

## 2025-08-07 ENCOUNTER — CLINICAL SUPPORT (OUTPATIENT)
Dept: REHABILITATION | Facility: HOSPITAL | Age: 80
End: 2025-08-07
Payer: MEDICARE

## 2025-08-07 DIAGNOSIS — Z98.890 S/P SPINAL SURGERY: ICD-10-CM

## 2025-08-07 DIAGNOSIS — Z74.09 DECREASED STRENGTH, ENDURANCE, AND MOBILITY: ICD-10-CM

## 2025-08-07 DIAGNOSIS — R53.1 DECREASED STRENGTH, ENDURANCE, AND MOBILITY: ICD-10-CM

## 2025-08-07 DIAGNOSIS — R68.89 DECREASED STRENGTH, ENDURANCE, AND MOBILITY: ICD-10-CM

## 2025-08-07 DIAGNOSIS — M54.16 LUMBAR RADICULOPATHY: Primary | ICD-10-CM

## 2025-08-07 PROCEDURE — 97110 THERAPEUTIC EXERCISES: CPT | Mod: KX,PN,CQ

## 2025-08-07 NOTE — PROGRESS NOTES
Outpatient Rehab    Physical Therapy Visit    Patient Name: Brandi Manuel  MRN: [id number]  YOB: 1945  Encounter Date: 8/7/2025    Therapy Diagnosis:   Encounter Diagnoses   Name Primary?    Lumbar radiculopathy Yes    S/P spinal surgery     Decreased strength, endurance, and mobility      Physician: RICHIE Banks NP    Physician Orders: Eval and Treat  Medical Diagnosis: Lumbar radiculopathy  S/P spinal surgery  Surgical Diagnosis: Not applicable for this Episode   Surgical Date: Not applicable for this Episode  Days Since Last Surgery: Not applicable for this Episode    Visit # / Visits Authorized:  6 / 12  Insurance Authorization Period: 7/9/2025 to 12/31/2025  Date of Evaluation: 7/9/2025  Plan of Care Certification: 7/9/2025 to 9/3/2025      PT/PTA: PTA   Number of PTA visits since last PT visit:2  Time In: 0800   Time Out: 0900  Total Time (in minutes): 60   Total Billable Time (in minutes): 60    FOTO:  Intake Score (%): 36  Survey Score 2 (%): Not applicable for this Episode  Survey Score 3 (%): Not applicable for this Episode    Precautions:         Subjective   I am always in pain.  Pain reported as 4/10.      Objective            Treatment:  Therapeutic Exercise  TE 1: LTR - 30 reps  TE 3: bike - 10'  TE 4: HIp abd w/ BTB 3x10  TE 5: bridges - 30 reps w/ BTB  TE 6: SIt <> stand w/ #3 3x10  TE 7: SKTC  3x10  TE 8: SLR 3x10 #2  TE 9: heel raises 3x10  TE 10: Swiss ball roll outs 3x10      Time Entry(in minutes):  Therapeutic Exercise Time Entry: 60    Assessment & Plan   Assessment: Heel raises and sit <> stands added to improve LE strength for ADLs. Sit <> stands challenging due to discomfort in knees but no pain in back. Swiss ball roll outs added to stretch lumbar region. No increase in pain today.  Evaluation/Treatment Tolerance: Patient tolerated treatment well    The patient will continue to benefit from skilled outpatient physical therapy in order to address the deficits listed in  the problem list on the initial evaluation, provide patient and family education, and maximize the patients level of independence in the home and community environments.     The patient's spiritual, cultural, and educational needs were considered, and the patient is agreeable to the plan of care and goals.           Plan: Continue with current POC    Goals:   Active       LTG       Pt to display improved functional legs strength with 30 sec STS performance to 14 reps or better with minimal to no UE assist. (Progressing)       Start:  07/10/25    Expected End:  09/03/25            Pt will reports improved functional activities tolerance to 80% or better return to PLOF capacity to care for her  and manage home chores. (Progressing)       Start:  07/10/25    Expected End:  09/03/25               STG       Pt to report improved pain symptoms by 2-3 points from initial eval. (Progressing)       Start:  07/10/25    Expected End:  08/06/25            Pt to display independence with initial HEP program, to facilitate transition to independent management of chronic condition.  (Progressing)       Start:  07/10/25    Expected End:  08/06/25                Hoang Baxter, PTA

## 2025-08-12 ENCOUNTER — CLINICAL SUPPORT (OUTPATIENT)
Dept: REHABILITATION | Facility: HOSPITAL | Age: 80
End: 2025-08-12
Payer: MEDICARE

## 2025-08-12 DIAGNOSIS — Z98.890 S/P SPINAL SURGERY: ICD-10-CM

## 2025-08-12 DIAGNOSIS — M54.16 LUMBAR RADICULOPATHY: Primary | ICD-10-CM

## 2025-08-12 DIAGNOSIS — R53.1 DECREASED STRENGTH, ENDURANCE, AND MOBILITY: ICD-10-CM

## 2025-08-12 DIAGNOSIS — R68.89 DECREASED STRENGTH, ENDURANCE, AND MOBILITY: ICD-10-CM

## 2025-08-12 DIAGNOSIS — Z74.09 DECREASED STRENGTH, ENDURANCE, AND MOBILITY: ICD-10-CM

## 2025-08-12 PROCEDURE — 97110 THERAPEUTIC EXERCISES: CPT | Mod: KX,PN,CQ

## 2025-08-14 ENCOUNTER — CLINICAL SUPPORT (OUTPATIENT)
Dept: REHABILITATION | Facility: HOSPITAL | Age: 80
End: 2025-08-14
Payer: MEDICARE

## 2025-08-14 DIAGNOSIS — Z98.890 S/P SPINAL SURGERY: ICD-10-CM

## 2025-08-14 DIAGNOSIS — M54.16 LUMBAR RADICULOPATHY: Primary | ICD-10-CM

## 2025-08-14 DIAGNOSIS — R68.89 DECREASED STRENGTH, ENDURANCE, AND MOBILITY: ICD-10-CM

## 2025-08-14 DIAGNOSIS — R53.1 DECREASED STRENGTH, ENDURANCE, AND MOBILITY: ICD-10-CM

## 2025-08-14 DIAGNOSIS — Z74.09 DECREASED STRENGTH, ENDURANCE, AND MOBILITY: ICD-10-CM

## 2025-08-14 PROCEDURE — 97110 THERAPEUTIC EXERCISES: CPT | Mod: KX,PN,CQ

## 2025-08-19 ENCOUNTER — OFFICE VISIT (OUTPATIENT)
Dept: ORTHOPEDICS | Facility: CLINIC | Age: 80
End: 2025-08-19
Payer: MEDICARE

## 2025-08-19 ENCOUNTER — CLINICAL SUPPORT (OUTPATIENT)
Dept: REHABILITATION | Facility: HOSPITAL | Age: 80
End: 2025-08-19
Payer: MEDICARE

## 2025-08-19 VITALS — HEIGHT: 62 IN | WEIGHT: 158.75 LBS | BODY MASS INDEX: 29.21 KG/M2

## 2025-08-19 DIAGNOSIS — M17.0 BILATERAL PRIMARY OSTEOARTHRITIS OF KNEE: Primary | ICD-10-CM

## 2025-08-19 DIAGNOSIS — Z74.09 DECREASED STRENGTH, ENDURANCE, AND MOBILITY: ICD-10-CM

## 2025-08-19 DIAGNOSIS — M54.16 LUMBAR RADICULOPATHY: Primary | ICD-10-CM

## 2025-08-19 DIAGNOSIS — Z98.890 S/P SPINAL SURGERY: ICD-10-CM

## 2025-08-19 DIAGNOSIS — R68.89 DECREASED STRENGTH, ENDURANCE, AND MOBILITY: ICD-10-CM

## 2025-08-19 DIAGNOSIS — R53.1 DECREASED STRENGTH, ENDURANCE, AND MOBILITY: ICD-10-CM

## 2025-08-19 PROCEDURE — 20610 DRAIN/INJ JOINT/BURSA W/O US: CPT | Mod: 50,PBBFAC

## 2025-08-19 PROCEDURE — 99999 PR PBB SHADOW E&M-EST. PATIENT-LVL III: CPT | Mod: PBBFAC,,,

## 2025-08-19 PROCEDURE — 97110 THERAPEUTIC EXERCISES: CPT | Mod: KX,PN,CQ

## 2025-08-19 PROCEDURE — 99213 OFFICE O/P EST LOW 20 MIN: CPT | Mod: PBBFAC,25

## 2025-08-19 PROCEDURE — 99999PBSHW PR PBB SHADOW TECHNICAL ONLY FILED TO HB: Mod: PBBFAC,,,

## 2025-08-19 PROCEDURE — 20610 DRAIN/INJ JOINT/BURSA W/O US: CPT | Mod: 50,S$PBB,,

## 2025-08-19 PROCEDURE — 99499 UNLISTED E&M SERVICE: CPT | Mod: S$PBB,,,

## 2025-08-19 RX ADMIN — BETAMETHASONE ACETATE AND BETAMETHASONE SODIUM PHOSPHATE 6 MG: 3; 3 INJECTION, SUSPENSION INTRA-ARTICULAR; INTRALESIONAL; INTRAMUSCULAR; SOFT TISSUE at 03:08

## 2025-08-20 ENCOUNTER — OFFICE VISIT (OUTPATIENT)
Dept: PRIMARY CARE CLINIC | Facility: CLINIC | Age: 80
End: 2025-08-20
Payer: MEDICARE

## 2025-08-20 VITALS
SYSTOLIC BLOOD PRESSURE: 114 MMHG | OXYGEN SATURATION: 98 % | HEIGHT: 62 IN | BODY MASS INDEX: 29.21 KG/M2 | WEIGHT: 158.75 LBS | HEART RATE: 70 BPM | DIASTOLIC BLOOD PRESSURE: 63 MMHG | TEMPERATURE: 98 F

## 2025-08-20 DIAGNOSIS — R73.03 PREDIABETES: ICD-10-CM

## 2025-08-20 DIAGNOSIS — E78.5 HYPERLIPIDEMIA, UNSPECIFIED HYPERLIPIDEMIA TYPE: ICD-10-CM

## 2025-08-20 DIAGNOSIS — E55.9 VITAMIN D DEFICIENCY, UNSPECIFIED: ICD-10-CM

## 2025-08-20 DIAGNOSIS — H81.13 BENIGN PAROXYSMAL VERTIGO, BILATERAL: ICD-10-CM

## 2025-08-20 DIAGNOSIS — R11.2 NAUSEA AND VOMITING, UNSPECIFIED VOMITING TYPE: ICD-10-CM

## 2025-08-20 DIAGNOSIS — I10 ESSENTIAL HYPERTENSION: ICD-10-CM

## 2025-08-20 DIAGNOSIS — Z00.00 ANNUAL PHYSICAL EXAM: Primary | ICD-10-CM

## 2025-08-20 DIAGNOSIS — R21 RASH AND NONSPECIFIC SKIN ERUPTION: ICD-10-CM

## 2025-08-20 PROBLEM — N18.31 CHRONIC KIDNEY DISEASE, STAGE 3A: Status: RESOLVED | Noted: 2024-04-04 | Resolved: 2025-08-20

## 2025-08-20 PROCEDURE — 99999PBSHW PR PBB SHADOW TECHNICAL ONLY FILED TO HB: Mod: PBBFAC,,,

## 2025-08-20 PROCEDURE — 99999 PR PBB SHADOW E&M-EST. PATIENT-LVL III: CPT | Mod: PBBFAC,,, | Performed by: FAMILY MEDICINE

## 2025-08-20 PROCEDURE — 99213 OFFICE O/P EST LOW 20 MIN: CPT | Mod: PBBFAC,PN | Performed by: FAMILY MEDICINE

## 2025-08-20 RX ORDER — ONDANSETRON 4 MG/1
4 TABLET, ORALLY DISINTEGRATING ORAL EVERY 8 HOURS PRN
Qty: 30 TABLET | Refills: 3 | Status: SHIPPED | OUTPATIENT
Start: 2025-08-20

## 2025-08-20 RX ORDER — MELOXICAM 15 MG/1
15 TABLET ORAL DAILY PRN
Qty: 90 TABLET | Refills: 1 | Status: SHIPPED | OUTPATIENT
Start: 2025-08-20

## 2025-08-20 RX ORDER — AMLODIPINE BESYLATE 5 MG/1
5 TABLET ORAL DAILY
Qty: 90 TABLET | Refills: 3 | Status: SHIPPED | OUTPATIENT
Start: 2025-08-20

## 2025-08-20 RX ORDER — MECLIZINE HYDROCHLORIDE 25 MG/1
25 TABLET ORAL 2 TIMES DAILY PRN
Qty: 90 TABLET | Refills: 2 | Status: SHIPPED | OUTPATIENT
Start: 2025-08-20

## 2025-08-20 RX ORDER — TRIAMCINOLONE ACETONIDE 1 MG/G
OINTMENT TOPICAL 3 TIMES DAILY
Qty: 453.6 G | Refills: 3 | Status: SHIPPED | OUTPATIENT
Start: 2025-08-20

## 2025-08-20 RX ORDER — BETAMETHASONE SODIUM PHOSPHATE AND BETAMETHASONE ACETATE 3; 3 MG/ML; MG/ML
6 INJECTION, SUSPENSION INTRA-ARTICULAR; INTRALESIONAL; INTRAMUSCULAR; SOFT TISSUE
Status: DISCONTINUED | OUTPATIENT
Start: 2025-08-19 | End: 2025-08-20 | Stop reason: HOSPADM

## 2025-08-20 RX ORDER — BETAMETHASONE SODIUM PHOSPHATE AND BETAMETHASONE ACETATE 3; 3 MG/ML; MG/ML
12 INJECTION, SUSPENSION INTRA-ARTICULAR; INTRALESIONAL; INTRAMUSCULAR; SOFT TISSUE
Status: COMPLETED | OUTPATIENT
Start: 2025-08-20 | End: 2025-08-20

## 2025-08-20 RX ADMIN — BETAMETHASONE SODIUM PHOSPHATE AND BETAMETHASONE ACETATE 12 MG: 3; 3 INJECTION, SUSPENSION INTRA-ARTICULAR; INTRALESIONAL; INTRAMUSCULAR; SOFT TISSUE at 12:08

## 2025-08-22 ENCOUNTER — LAB VISIT (OUTPATIENT)
Dept: PRIMARY CARE CLINIC | Facility: CLINIC | Age: 80
End: 2025-08-22
Payer: MEDICARE

## 2025-08-22 DIAGNOSIS — H81.13 BENIGN PAROXYSMAL VERTIGO, BILATERAL: ICD-10-CM

## 2025-08-22 DIAGNOSIS — R11.2 NAUSEA AND VOMITING, UNSPECIFIED VOMITING TYPE: ICD-10-CM

## 2025-08-22 DIAGNOSIS — E78.5 HYPERLIPIDEMIA, UNSPECIFIED HYPERLIPIDEMIA TYPE: ICD-10-CM

## 2025-08-22 DIAGNOSIS — E55.9 VITAMIN D DEFICIENCY, UNSPECIFIED: ICD-10-CM

## 2025-08-22 DIAGNOSIS — R73.03 PREDIABETES: ICD-10-CM

## 2025-08-22 DIAGNOSIS — I10 ESSENTIAL HYPERTENSION: ICD-10-CM

## 2025-08-22 DIAGNOSIS — R21 RASH AND NONSPECIFIC SKIN ERUPTION: ICD-10-CM

## 2025-08-22 DIAGNOSIS — Z00.00 ANNUAL PHYSICAL EXAM: ICD-10-CM

## 2025-08-22 LAB
25(OH)D3+25(OH)D2 SERPL-MCNC: 32 NG/ML (ref 30–96)
ABSOLUTE EOSINOPHIL (OHS): 0.03 K/UL
ABSOLUTE MONOCYTE (OHS): 0.26 K/UL (ref 0.3–1)
ABSOLUTE NEUTROPHIL COUNT (OHS): 1.74 K/UL (ref 1.8–7.7)
ALBUMIN SERPL BCP-MCNC: 4.1 G/DL (ref 3.5–5.2)
ALP SERPL-CCNC: 109 UNIT/L (ref 40–150)
ALT SERPL W/O P-5'-P-CCNC: 13 UNIT/L (ref 0–55)
ANION GAP (OHS): 10 MMOL/L (ref 8–16)
AST SERPL-CCNC: 29 UNIT/L (ref 0–50)
BASOPHILS # BLD AUTO: 0.02 K/UL
BASOPHILS NFR BLD AUTO: 0.6 %
BILIRUB SERPL-MCNC: 0.4 MG/DL (ref 0.1–1)
BUN SERPL-MCNC: 14 MG/DL (ref 8–23)
CALCIUM SERPL-MCNC: 9.8 MG/DL (ref 8.7–10.5)
CHLORIDE SERPL-SCNC: 103 MMOL/L (ref 95–110)
CHOLEST SERPL-MCNC: 195 MG/DL (ref 120–199)
CHOLEST/HDLC SERPL: 3.6 {RATIO} (ref 2–5)
CO2 SERPL-SCNC: 27 MMOL/L (ref 23–29)
CREAT SERPL-MCNC: 1 MG/DL (ref 0.5–1.4)
EAG (OHS): 120 MG/DL (ref 68–131)
ERYTHROCYTE [DISTWIDTH] IN BLOOD BY AUTOMATED COUNT: 12.5 % (ref 11.5–14.5)
GFR SERPLBLD CREATININE-BSD FMLA CKD-EPI: 58 ML/MIN/1.73/M2
GLUCOSE SERPL-MCNC: 92 MG/DL (ref 70–110)
HBA1C MFR BLD: 5.8 % (ref 4–5.6)
HCT VFR BLD AUTO: 35.5 % (ref 37–48.5)
HDLC SERPL-MCNC: 54 MG/DL (ref 40–75)
HDLC SERPL: 27.7 % (ref 20–50)
HGB BLD-MCNC: 11.4 GM/DL (ref 12–16)
IMM GRANULOCYTES # BLD AUTO: 0.01 K/UL (ref 0–0.04)
IMM GRANULOCYTES NFR BLD AUTO: 0.3 % (ref 0–0.5)
LDLC SERPL CALC-MCNC: 125.6 MG/DL (ref 63–159)
LYMPHOCYTES # BLD AUTO: 1.5 K/UL (ref 1–4.8)
MCH RBC QN AUTO: 30 PG (ref 27–31)
MCHC RBC AUTO-ENTMCNC: 32.1 G/DL (ref 32–36)
MCV RBC AUTO: 93 FL (ref 82–98)
NONHDLC SERPL-MCNC: 141 MG/DL
NUCLEATED RBC (/100WBC) (OHS): 0 /100 WBC
PLATELET # BLD AUTO: 183 K/UL (ref 150–450)
PMV BLD AUTO: 11.4 FL (ref 9.2–12.9)
POTASSIUM SERPL-SCNC: 4.3 MMOL/L (ref 3.5–5.1)
PROT SERPL-MCNC: 8 GM/DL (ref 6–8.4)
RBC # BLD AUTO: 3.8 M/UL (ref 4–5.4)
RELATIVE EOSINOPHIL (OHS): 0.8 %
RELATIVE LYMPHOCYTE (OHS): 42.1 % (ref 18–48)
RELATIVE MONOCYTE (OHS): 7.3 % (ref 4–15)
RELATIVE NEUTROPHIL (OHS): 48.9 % (ref 38–73)
SODIUM SERPL-SCNC: 140 MMOL/L (ref 136–145)
TRIGL SERPL-MCNC: 77 MG/DL (ref 30–150)
WBC # BLD AUTO: 3.56 K/UL (ref 3.9–12.7)

## 2025-08-22 PROCEDURE — 85025 COMPLETE CBC W/AUTO DIFF WBC: CPT

## 2025-08-22 PROCEDURE — 82306 VITAMIN D 25 HYDROXY: CPT

## 2025-08-22 PROCEDURE — 82040 ASSAY OF SERUM ALBUMIN: CPT

## 2025-08-22 PROCEDURE — 83036 HEMOGLOBIN GLYCOSYLATED A1C: CPT

## 2025-08-22 PROCEDURE — 80061 LIPID PANEL: CPT

## 2025-08-25 PROBLEM — Z74.09 DECREASED STRENGTH, ENDURANCE, AND MOBILITY: Status: RESOLVED | Noted: 2025-07-09 | Resolved: 2025-08-25

## 2025-08-25 PROBLEM — R68.89 DECREASED STRENGTH, ENDURANCE, AND MOBILITY: Status: RESOLVED | Noted: 2025-07-09 | Resolved: 2025-08-25

## 2025-08-25 PROBLEM — R53.1 DECREASED STRENGTH, ENDURANCE, AND MOBILITY: Status: RESOLVED | Noted: 2025-07-09 | Resolved: 2025-08-25

## 2025-08-25 PROBLEM — Z98.890 S/P SPINAL SURGERY: Status: RESOLVED | Noted: 2025-07-09 | Resolved: 2025-08-25

## 2025-09-02 ENCOUNTER — CLINICAL SUPPORT (OUTPATIENT)
Dept: REHABILITATION | Facility: HOSPITAL | Age: 80
End: 2025-09-02
Payer: MEDICARE

## 2025-09-02 DIAGNOSIS — M54.16 LUMBAR RADICULOPATHY: Primary | ICD-10-CM

## 2025-09-02 PROCEDURE — 97110 THERAPEUTIC EXERCISES: CPT | Mod: KX,PN
